# Patient Record
Sex: MALE | Race: BLACK OR AFRICAN AMERICAN | NOT HISPANIC OR LATINO | URBAN - METROPOLITAN AREA
[De-identification: names, ages, dates, MRNs, and addresses within clinical notes are randomized per-mention and may not be internally consistent; named-entity substitution may affect disease eponyms.]

---

## 2019-10-01 ENCOUNTER — OUTPATIENT (OUTPATIENT)
Dept: OUTPATIENT SERVICES | Facility: HOSPITAL | Age: 69
LOS: 1 days | End: 2019-10-01
Payer: MEDICARE

## 2019-10-01 DIAGNOSIS — Z98.89 OTHER SPECIFIED POSTPROCEDURAL STATES: Chronic | ICD-10-CM

## 2019-10-01 PROCEDURE — G9001: CPT

## 2019-10-03 ENCOUNTER — INPATIENT (INPATIENT)
Facility: HOSPITAL | Age: 69
LOS: 13 days | Discharge: HOME | DRG: 871 | End: 2019-10-17
Attending: HOSPITALIST | Admitting: HOSPITALIST
Payer: MEDICARE

## 2019-10-03 VITALS
RESPIRATION RATE: 20 BRPM | HEIGHT: 73 IN | TEMPERATURE: 98 F | OXYGEN SATURATION: 100 % | HEART RATE: 109 BPM | SYSTOLIC BLOOD PRESSURE: 133 MMHG | WEIGHT: 188.94 LBS | DIASTOLIC BLOOD PRESSURE: 102 MMHG

## 2019-10-03 DIAGNOSIS — A41.9 SEPSIS, UNSPECIFIED ORGANISM: ICD-10-CM

## 2019-10-03 DIAGNOSIS — Z98.89 OTHER SPECIFIED POSTPROCEDURAL STATES: Chronic | ICD-10-CM

## 2019-10-03 LAB
ALBUMIN SERPL ELPH-MCNC: 2.7 G/DL — LOW (ref 3.3–5.2)
ALBUMIN SERPL ELPH-MCNC: 3.3 G/DL — SIGNIFICANT CHANGE UP (ref 3.3–5.2)
ALP SERPL-CCNC: 107 U/L — SIGNIFICANT CHANGE UP (ref 40–120)
ALP SERPL-CCNC: 142 U/L — HIGH (ref 40–120)
ALT FLD-CCNC: 38 U/L — SIGNIFICANT CHANGE UP
ALT FLD-CCNC: 51 U/L — HIGH
AMMONIA BLD-MCNC: 22 UMOL/L — SIGNIFICANT CHANGE UP (ref 11–55)
ANION GAP SERPL CALC-SCNC: 14 MMOL/L — SIGNIFICANT CHANGE UP (ref 5–17)
ANION GAP SERPL CALC-SCNC: 17 MMOL/L — SIGNIFICANT CHANGE UP (ref 5–17)
ANISOCYTOSIS BLD QL: SLIGHT — SIGNIFICANT CHANGE UP
APAP SERPL-MCNC: <7.5 UG/ML — LOW (ref 10–26)
APPEARANCE CSF: CLEAR — SIGNIFICANT CHANGE UP
APTT BLD: 22.1 SEC — LOW (ref 27.5–36.3)
AST SERPL-CCNC: 157 U/L — HIGH
AST SERPL-CCNC: 203 U/L — HIGH
BASOPHILS # BLD AUTO: 0 K/UL — SIGNIFICANT CHANGE UP (ref 0–0.2)
BASOPHILS NFR BLD AUTO: 0 % — SIGNIFICANT CHANGE UP (ref 0–2)
BILIRUB DIRECT SERPL-MCNC: 0.7 MG/DL — HIGH (ref 0–0.3)
BILIRUB INDIRECT FLD-MCNC: 0.9 MG/DL — SIGNIFICANT CHANGE UP (ref 0.2–1)
BILIRUB SERPL-MCNC: 1.6 MG/DL — SIGNIFICANT CHANGE UP (ref 0.4–2)
BILIRUB SERPL-MCNC: 2.5 MG/DL — HIGH (ref 0.4–2)
BUN SERPL-MCNC: 18 MG/DL — SIGNIFICANT CHANGE UP (ref 8–20)
BUN SERPL-MCNC: 20 MG/DL — SIGNIFICANT CHANGE UP (ref 8–20)
CALCIUM SERPL-MCNC: 7.6 MG/DL — LOW (ref 8.6–10.2)
CALCIUM SERPL-MCNC: 8.8 MG/DL — SIGNIFICANT CHANGE UP (ref 8.6–10.2)
CHLORIDE SERPL-SCNC: 90 MMOL/L — LOW (ref 98–107)
CHLORIDE SERPL-SCNC: 93 MMOL/L — LOW (ref 98–107)
CK MB CFR SERPL CALC: 8.6 NG/ML — HIGH (ref 0–6.7)
CK SERPL-CCNC: 3499 U/L — HIGH (ref 30–200)
CO2 SERPL-SCNC: 22 MMOL/L — SIGNIFICANT CHANGE UP (ref 22–29)
CO2 SERPL-SCNC: 23 MMOL/L — SIGNIFICANT CHANGE UP (ref 22–29)
COLOR CSF: SIGNIFICANT CHANGE UP
CREAT SERPL-MCNC: 1.47 MG/DL — HIGH (ref 0.5–1.3)
CREAT SERPL-MCNC: 1.84 MG/DL — HIGH (ref 0.5–1.3)
EOSINOPHIL # BLD AUTO: 0 K/UL — SIGNIFICANT CHANGE UP (ref 0–0.5)
EOSINOPHIL NFR BLD AUTO: 0 % — SIGNIFICANT CHANGE UP (ref 0–6)
ETHANOL SERPL-MCNC: <10 MG/DL — SIGNIFICANT CHANGE UP
GIANT PLATELETS BLD QL SMEAR: PRESENT — SIGNIFICANT CHANGE UP
GLUCOSE CSF-MCNC: 81 MG/DLG/24H — HIGH (ref 40–70)
GLUCOSE SERPL-MCNC: 116 MG/DL — HIGH (ref 70–115)
GLUCOSE SERPL-MCNC: 97 MG/DL — SIGNIFICANT CHANGE UP (ref 70–115)
GRAM STN FLD: SIGNIFICANT CHANGE UP
HCT VFR BLD CALC: 34.8 % — LOW (ref 39–50)
HGB BLD-MCNC: 11.4 G/DL — LOW (ref 13–17)
INR BLD: 1.05 RATIO — SIGNIFICANT CHANGE UP (ref 0.88–1.16)
LACTATE BLDV-MCNC: 2.7 MMOL/L — HIGH (ref 0.5–2)
LACTATE BLDV-MCNC: 4.5 MMOL/L — CRITICAL HIGH (ref 0.5–2)
LYMPHOCYTES # BLD AUTO: 0.73 K/UL — LOW (ref 1–3.3)
LYMPHOCYTES # BLD AUTO: 10.3 % — LOW (ref 13–44)
MACROCYTES BLD QL: SLIGHT — SIGNIFICANT CHANGE UP
MAGNESIUM SERPL-MCNC: 1.2 MG/DL — LOW (ref 1.6–2.6)
MANUAL SMEAR VERIFICATION: SIGNIFICANT CHANGE UP
MCHC RBC-ENTMCNC: 32.8 GM/DL — SIGNIFICANT CHANGE UP (ref 32–36)
MCHC RBC-ENTMCNC: 34.5 PG — HIGH (ref 27–34)
MCV RBC AUTO: 105.5 FL — HIGH (ref 80–100)
MONOCYTES # BLD AUTO: 0.73 K/UL — SIGNIFICANT CHANGE UP (ref 0–0.9)
MONOCYTES NFR BLD AUTO: 10.3 % — SIGNIFICANT CHANGE UP (ref 2–14)
MYELOCYTES NFR BLD: 0.8 % — HIGH (ref 0–0)
NEUTROPHILS # BLD AUTO: 5.51 K/UL — SIGNIFICANT CHANGE UP (ref 1.8–7.4)
NEUTROPHILS # CSF: SIGNIFICANT CHANGE UP %
NEUTROPHILS NFR BLD AUTO: 77 % — SIGNIFICANT CHANGE UP (ref 43–77)
NEUTS BAND # BLD: 0.8 % — SIGNIFICANT CHANGE UP (ref 0–8)
NRBC NFR CSF: 0 /UL — SIGNIFICANT CHANGE UP (ref 0–5)
NT-PROBNP SERPL-SCNC: 1126 PG/ML — HIGH (ref 0–300)
PHOSPHATE SERPL-MCNC: 3.5 MG/DL — SIGNIFICANT CHANGE UP (ref 2.4–4.7)
PLAT MORPH BLD: NORMAL — SIGNIFICANT CHANGE UP
PLATELET # BLD AUTO: 139 K/UL — LOW (ref 150–400)
PLATELET COUNT - ESTIMATE: NORMAL — SIGNIFICANT CHANGE UP
POTASSIUM SERPL-MCNC: 3.6 MMOL/L — SIGNIFICANT CHANGE UP (ref 3.5–5.3)
POTASSIUM SERPL-MCNC: 4 MMOL/L — SIGNIFICANT CHANGE UP (ref 3.5–5.3)
POTASSIUM SERPL-SCNC: 3.6 MMOL/L — SIGNIFICANT CHANGE UP (ref 3.5–5.3)
POTASSIUM SERPL-SCNC: 4 MMOL/L — SIGNIFICANT CHANGE UP (ref 3.5–5.3)
PROT CSF-MCNC: 36 MG/DL — SIGNIFICANT CHANGE UP (ref 15–45)
PROT SERPL-MCNC: 7.1 G/DL — SIGNIFICANT CHANGE UP (ref 6.6–8.7)
PROT SERPL-MCNC: 8.6 G/DL — SIGNIFICANT CHANGE UP (ref 6.6–8.7)
PROTHROM AB SERPL-ACNC: 12.1 SEC — SIGNIFICANT CHANGE UP (ref 10–12.9)
RAPID RVP RESULT: SIGNIFICANT CHANGE UP
RBC # BLD: 3.3 M/UL — LOW (ref 4.2–5.8)
RBC # CSF: 0 /CMM — SIGNIFICANT CHANGE UP (ref 0–1)
RBC # FLD: 12 % — SIGNIFICANT CHANGE UP (ref 10.3–14.5)
RBC BLD AUTO: ABNORMAL
SALICYLATES SERPL-MCNC: <0.6 MG/DL — LOW (ref 10–20)
SODIUM SERPL-SCNC: 129 MMOL/L — LOW (ref 135–145)
SODIUM SERPL-SCNC: 130 MMOL/L — LOW (ref 135–145)
SPECIMEN SOURCE: SIGNIFICANT CHANGE UP
TROPONIN T SERPL-MCNC: 0.02 NG/ML — SIGNIFICANT CHANGE UP (ref 0–0.06)
TSH SERPL-MCNC: 0.86 UIU/ML — SIGNIFICANT CHANGE UP (ref 0.27–4.2)
TUBE TYPE: SIGNIFICANT CHANGE UP
VARIANT LYMPHS # BLD: 0.8 % — SIGNIFICANT CHANGE UP (ref 0–6)
WBC # BLD: 7.08 K/UL — SIGNIFICANT CHANGE UP (ref 3.8–10.5)
WBC # FLD AUTO: 7.08 K/UL — SIGNIFICANT CHANGE UP (ref 3.8–10.5)

## 2019-10-03 PROCEDURE — 70450 CT HEAD/BRAIN W/O DYE: CPT | Mod: 26

## 2019-10-03 PROCEDURE — 71045 X-RAY EXAM CHEST 1 VIEW: CPT | Mod: 26

## 2019-10-03 PROCEDURE — 76705 ECHO EXAM OF ABDOMEN: CPT | Mod: 26

## 2019-10-03 PROCEDURE — 99291 CRITICAL CARE FIRST HOUR: CPT | Mod: 25

## 2019-10-03 PROCEDURE — 93971 EXTREMITY STUDY: CPT | Mod: 26,LT

## 2019-10-03 PROCEDURE — 93010 ELECTROCARDIOGRAM REPORT: CPT

## 2019-10-03 PROCEDURE — 62270 DX LMBR SPI PNXR: CPT

## 2019-10-03 RX ORDER — ACYCLOVIR SODIUM 500 MG
850 VIAL (EA) INTRAVENOUS EVERY 12 HOURS
Refills: 0 | Status: DISCONTINUED | OUTPATIENT
Start: 2019-10-03 | End: 2019-10-04

## 2019-10-03 RX ORDER — ACETAMINOPHEN 500 MG
650 TABLET ORAL EVERY 6 HOURS
Refills: 0 | Status: DISCONTINUED | OUTPATIENT
Start: 2019-10-03 | End: 2019-10-06

## 2019-10-03 RX ORDER — VANCOMYCIN HCL 1 G
1000 VIAL (EA) INTRAVENOUS EVERY 12 HOURS
Refills: 0 | Status: DISCONTINUED | OUTPATIENT
Start: 2019-10-03 | End: 2019-10-03

## 2019-10-03 RX ORDER — ACYCLOVIR SODIUM 500 MG
850 VIAL (EA) INTRAVENOUS EVERY 8 HOURS
Refills: 0 | Status: DISCONTINUED | OUTPATIENT
Start: 2019-10-03 | End: 2019-10-03

## 2019-10-03 RX ORDER — VANCOMYCIN HCL 1 G
1000 VIAL (EA) INTRAVENOUS ONCE
Refills: 0 | Status: COMPLETED | OUTPATIENT
Start: 2019-10-03 | End: 2019-10-03

## 2019-10-03 RX ORDER — CEFTRIAXONE 500 MG/1
2000 INJECTION, POWDER, FOR SOLUTION INTRAMUSCULAR; INTRAVENOUS ONCE
Refills: 0 | Status: COMPLETED | OUTPATIENT
Start: 2019-10-03 | End: 2019-10-03

## 2019-10-03 RX ORDER — SODIUM CHLORIDE 9 MG/ML
2700 INJECTION INTRAMUSCULAR; INTRAVENOUS; SUBCUTANEOUS ONCE
Refills: 0 | Status: COMPLETED | OUTPATIENT
Start: 2019-10-03 | End: 2019-10-03

## 2019-10-03 RX ORDER — HEPARIN SODIUM 5000 [USP'U]/ML
3500 INJECTION INTRAVENOUS; SUBCUTANEOUS EVERY 6 HOURS
Refills: 0 | Status: DISCONTINUED | OUTPATIENT
Start: 2019-10-03 | End: 2019-10-04

## 2019-10-03 RX ORDER — SODIUM CHLORIDE 9 MG/ML
1000 INJECTION, SOLUTION INTRAVENOUS
Refills: 0 | Status: DISCONTINUED | OUTPATIENT
Start: 2019-10-03 | End: 2019-10-03

## 2019-10-03 RX ORDER — SODIUM CHLORIDE 9 MG/ML
1000 INJECTION INTRAMUSCULAR; INTRAVENOUS; SUBCUTANEOUS
Refills: 0 | Status: DISCONTINUED | OUTPATIENT
Start: 2019-10-03 | End: 2019-10-04

## 2019-10-03 RX ORDER — ACYCLOVIR SODIUM 500 MG
850 VIAL (EA) INTRAVENOUS ONCE
Refills: 0 | Status: COMPLETED | OUTPATIENT
Start: 2019-10-03 | End: 2019-10-03

## 2019-10-03 RX ORDER — SODIUM CHLORIDE 9 MG/ML
1000 INJECTION INTRAMUSCULAR; INTRAVENOUS; SUBCUTANEOUS ONCE
Refills: 0 | Status: DISCONTINUED | OUTPATIENT
Start: 2019-10-03 | End: 2019-10-03

## 2019-10-03 RX ORDER — THIAMINE MONONITRATE (VIT B1) 100 MG
100 TABLET ORAL DAILY
Refills: 0 | Status: COMPLETED | OUTPATIENT
Start: 2019-10-03 | End: 2019-10-06

## 2019-10-03 RX ORDER — SODIUM CHLORIDE 9 MG/ML
1000 INJECTION, SOLUTION INTRAVENOUS
Refills: 0 | Status: DISCONTINUED | OUTPATIENT
Start: 2019-10-03 | End: 2019-10-06

## 2019-10-03 RX ORDER — FOLIC ACID 0.8 MG
1 TABLET ORAL DAILY
Refills: 0 | Status: DISCONTINUED | OUTPATIENT
Start: 2019-10-03 | End: 2019-10-17

## 2019-10-03 RX ORDER — SACCHAROMYCES BOULARDII 250 MG
250 POWDER IN PACKET (EA) ORAL
Refills: 0 | Status: DISCONTINUED | OUTPATIENT
Start: 2019-10-03 | End: 2019-10-14

## 2019-10-03 RX ORDER — CEFTRIAXONE 500 MG/1
2000 INJECTION, POWDER, FOR SOLUTION INTRAMUSCULAR; INTRAVENOUS EVERY 12 HOURS
Refills: 0 | Status: DISCONTINUED | OUTPATIENT
Start: 2019-10-03 | End: 2019-10-04

## 2019-10-03 RX ORDER — VANCOMYCIN HCL 1 G
500 VIAL (EA) INTRAVENOUS EVERY 12 HOURS
Refills: 0 | Status: DISCONTINUED | OUTPATIENT
Start: 2019-10-03 | End: 2019-10-04

## 2019-10-03 RX ORDER — CEFTRIAXONE 500 MG/1
1000 INJECTION, POWDER, FOR SOLUTION INTRAMUSCULAR; INTRAVENOUS ONCE
Refills: 0 | Status: DISCONTINUED | OUTPATIENT
Start: 2019-10-03 | End: 2019-10-03

## 2019-10-03 RX ORDER — HEPARIN SODIUM 5000 [USP'U]/ML
INJECTION INTRAVENOUS; SUBCUTANEOUS
Qty: 25000 | Refills: 0 | Status: DISCONTINUED | OUTPATIENT
Start: 2019-10-03 | End: 2019-10-04

## 2019-10-03 RX ORDER — ACETAMINOPHEN 500 MG
975 TABLET ORAL ONCE
Refills: 0 | Status: COMPLETED | OUTPATIENT
Start: 2019-10-03 | End: 2019-10-03

## 2019-10-03 RX ORDER — HEPARIN SODIUM 5000 [USP'U]/ML
7000 INJECTION INTRAVENOUS; SUBCUTANEOUS EVERY 6 HOURS
Refills: 0 | Status: DISCONTINUED | OUTPATIENT
Start: 2019-10-03 | End: 2019-10-04

## 2019-10-03 RX ADMIN — SODIUM CHLORIDE 150 MILLILITER(S): 9 INJECTION INTRAMUSCULAR; INTRAVENOUS; SUBCUTANEOUS at 20:35

## 2019-10-03 RX ADMIN — CEFTRIAXONE 100 MILLIGRAM(S): 500 INJECTION, POWDER, FOR SOLUTION INTRAMUSCULAR; INTRAVENOUS at 15:16

## 2019-10-03 RX ADMIN — Medication 975 MILLIGRAM(S): at 16:17

## 2019-10-03 RX ADMIN — Medication 975 MILLIGRAM(S): at 15:17

## 2019-10-03 RX ADMIN — SODIUM CHLORIDE 2700 MILLILITER(S): 9 INJECTION INTRAMUSCULAR; INTRAVENOUS; SUBCUTANEOUS at 15:16

## 2019-10-03 RX ADMIN — Medication 250 MILLIGRAM(S): at 22:46

## 2019-10-03 RX ADMIN — Medication 100 MILLIGRAM(S): at 20:34

## 2019-10-03 RX ADMIN — Medication 2 MILLIGRAM(S): at 15:17

## 2019-10-03 NOTE — ED PROCEDURE NOTE - CPROC ED TIME OUT STATEMENT1
“Patient's name, , procedure and correct site were confirmed during the [facility] Timeout.” 19-Jul-2017

## 2019-10-03 NOTE — ED PROCEDURE NOTE - ATTENDING CONTRIBUTION TO CARE
I was present for key portion of procedure. -Jocelyn
I was present for key portion of procedure. -Jocelyn

## 2019-10-03 NOTE — H&P ADULT - HISTORY OF PRESENT ILLNESS
pt. is lying in bed tired looking and somewhat sedated, does not contribute to history. history of obtained from ED attending and patient chart.     This is a 67 y/o male with h/o HTN, EOTH use who has been laying on the bathroom floor for 2 days, hallucinating, weak with left LE edema. patient mother called EMS. patient currently being admitted with ETOH withdrawal, CIWA 5 status post ativan. Sepsis source unknown, status post LP. Rhabdomyolysis with KAYLEE and Left LE DVT.

## 2019-10-03 NOTE — H&P ADULT - NSHPSOCIALHISTORY_GEN_ALL_CORE
smoking history unknown , as per history drinks etoh. daily ? quantity ?    FH : unknown at this point as pt. not giving history.

## 2019-10-03 NOTE — ED ADULT NURSE REASSESSMENT NOTE - NS ED NURSE REASSESS COMMENT FT1
per day Rn pt did not have IV access, unable to get all of fluids, MD Erickson at bedside, aware of bp, plan per MD fluids, abx then heparin, per MD hold librium for now pt sleeping comfortably remains on monitor, offers no complaints. alert to self.  periods of confusion noted poor historian.

## 2019-10-03 NOTE — ED PROVIDER NOTE - OBJECTIVE STATEMENT
67yo M with h/o HTN, daily drinker a few beers, denies h/o withdrawal, OSCAR, mother called 911, patient laid onto floor 2 days ago and could not get back up, denies actual fall. pain to leg throbbing nonradiating worse with movement. says too weak and leg swollen so can't get up. left leg > rt pain in foot. no HA/neck pain. no rash. no nausea/vomiting/diarrhea. no abd pain. no cough/SOB. no family at bedside. denies CP.

## 2019-10-03 NOTE — ED PROVIDER NOTE - CRITICAL CARE PROVIDED
additional history taking/consultation with other physicians/documentation/direct patient care (not related to procedure)/interpretation of diagnostic studies additional history taking/documentation/consult w/ pt's family directly relating to pts condition/direct patient care (not related to procedure)/interpretation of diagnostic studies/consultation with other physicians

## 2019-10-03 NOTE — ED ADULT TRIAGE NOTE - CHIEF COMPLAINT QUOTE
68 year old male alert to self and place biba from home per ems pt mother called and reported pt was in bathroom for two days, lay down on bathroom floor, visual hallucinations, admits to drinking 2 or 3 beers daily and no reports of etoh abuse, last drink 2 days pta, no falls. Per EMS mother has been feeding pt in the bathroom as he would not get up from floor.

## 2019-10-03 NOTE — H&P ADULT - ASSESSMENT
pt. is admitted for     - confusion.     - Acute febrile illness    - Left lower ext. acute dvt unspecified vein.    - Alcohol withdrawal syndrome without complication.    - Darci    - Rhabdomyolysis.     Pt.'s LP negative for now, confusion and fever , meninigitis is in differential, possible viral but will keep on vanco, rocephin and acyclovir for now, follow csf studies, ID consult. iv hydration, iv heparin for dvt,  ativan per symptom triggered. close follow up of labs and ck. Ck elevated as pt. was on the floor for 2 days per history.

## 2019-10-03 NOTE — ED ADULT NURSE REASSESSMENT NOTE - NS ED NURSE REASSESS COMMENT FT1
pr back from MD Jocelyn norris at bedside, MD made aware pts IV very positional and not flowing well. pt encouraged to keep arm down, MD states she will place another line.

## 2019-10-03 NOTE — ED PROVIDER NOTE - PROGRESS NOTE DETAILS
febrile, CODE SEPSIS called, unknown source at this time, patient denies HA/neck pain, no meningismus on exam. patient AOx3. -Jocelyn DO spoke with mother, patient daily heavy drinker, was hallucinating today, seeing  and kids in the yard. no trauma, 2 days ago didn't feel well so laid down on bathroom floor. no head trauma. PMH- HTN only, unsure of h/o withdrawal. unsure of any infecitous sx. never acted like this before. no leukocytosis, noted to have elevated LFTs with AST double ALT consistent with alcohl use but also elevated alk phos and bili. no abd pain on exam no jaundice, will get sono right upper quadrant. will proceed with LP to completely r/o meningitis/encephalitis -Slowey DO patient received ativan, tremors improved, +DVT with complex bakers cyst- Lt knee no erythema/warmth FROM without pain. no concern for septic joint. will perform LP unknown source of fever. cover for encephalitis. RVP/UA pending. US GB wnl. will admit -Jocelyn DO

## 2019-10-03 NOTE — ED PROVIDER NOTE - CLINICAL SUMMARY MEDICAL DECISION MAKING FREE TEXT BOX
patient with AMS per family, daily dirnker has not drank in 2 days, has been on bathroom floor per patient due to leg swelling and pain. patient AOx3 now. has swelling to Lt LE and pain no rash. no distress. +tremor/fasciculations concern for withdrawal. will contact family. tachycardic. will get rectal temp for fever. no meningismus unlikely mengitis/encephelitis

## 2019-10-03 NOTE — ED PROVIDER NOTE - PHYSICAL EXAMINATION
Gen: NAD, AOx3, disheveled, smells of urine  Head: NCAT  HEENT: PERRL, EOMI, oral mucosa moist, normal conjunctiva, neck supple, +tongue fasciculations  Lung: CTAB, no respiratory distress  CV: rrr, no murmur, Normal perfusion  Abd: soft, NTND  MSK: +2 rt pedal pitting edema, +3 Lt leg/pedal pitting edema, no visible deformities  Neuro: No focal neurologic deficits, 5/5 UE strength, CN grossly intact, 4/5 rt LE strength, 3/5 Lt LE strength due to pain  Skin: No rash   Psych: flat affect

## 2019-10-03 NOTE — ED PROCEDURE NOTE - CPROC ED POST PROC CARE GUIDE1
Verbal/written post procedure instructions were given to patient/caregiver. Instructed patient/caregiver regarding signs and symptoms of infection./Verbal/written post procedure instructions were given to patient/caregiver.

## 2019-10-03 NOTE — ED PROVIDER NOTE - NS ED ROS FT
ROS: no CP/SOB. no cough. no fever. no n/v/d/c. no abd pain. no rash. no bleeding. no urinary complaints. +weakness. no vision changes. no HA. no neck/back pain. +extremity swelling. +change in mental status.

## 2019-10-03 NOTE — ED PROVIDER NOTE - SECONDARY DIAGNOSIS.
Non-traumatic rhabdomyolysis Acute kidney injury Altered mental status Alcohol withdrawal syndrome without complication Deep vein thrombophlebitis of leg, left

## 2019-10-03 NOTE — ED PROVIDER NOTE - CARE PLAN
Principal Discharge DX:	Sepsis  Secondary Diagnosis:	Non-traumatic rhabdomyolysis  Secondary Diagnosis:	Acute kidney injury  Secondary Diagnosis:	Altered mental status  Secondary Diagnosis:	Deep vein thrombophlebitis of leg, left  Secondary Diagnosis:	Alcohol withdrawal syndrome without complication

## 2019-10-03 NOTE — CONSULT NOTE ADULT - SUBJECTIVE AND OBJECTIVE BOX
Consulted request: Limited due to patient current medical condition. patient is minimally interactive with video conference. history of obtained from ED attending and patient chart.     This is a 67 y/o male with h/o HTN, EOTH use who has been laying on the bathroom floor for 2 days, hallucinating, weak with left LE edema. patient mother called EMS. patient currently being admitted with ETOH withdrawal, CIWA 5 status post ativan. Sepsis source unknown, status post LP. Rhabdomyolysis with KAYLEE and Left LE DVT.     - Basic orders placed   - CIWA protocol with symptoms triggered Ativan   - thiamine, folic acid, MVI  - IV hydration with MVI, prior to cont of Normal saline IV fluids  - Bedrest  [facility]  -   - Admit to monitored unit   - Hep drip pending to start once IV access is obtained.     Case discussed with Dr. Garland. Agree with limited orders placed.

## 2019-10-03 NOTE — H&P ADULT - NSHPPHYSICALEXAM_GEN_ALL_CORE
General: AA male lying in bed somewhat sedated , tired looking.   HEENT: AT, NC. PERRL. mucosa somewhat dry.  Neck: supple. no JVD.   Chest: CTA bilaterally  Heart: S1,S2. RRR. no heart murmur. trace edema of LE , left more than rt.  Abdomen: soft. non-tender. non-distended. + BS.   Ext: no calf tenderness. dp intact.  Neuro: pt. is somewhat sedated and not co-operative with exam, opens his eyes to verbal command and then goes back to sleep. noted to move all ext on his own.   Skin: warm and dry, no obvious rash.   Psych ; sedated, no agitation.

## 2019-10-04 LAB
ALBUMIN SERPL ELPH-MCNC: 2.5 G/DL — LOW (ref 3.3–5.2)
ALBUMIN SERPL ELPH-MCNC: 2.8 G/DL — LOW (ref 3.3–5.2)
ALP SERPL-CCNC: 100 U/L — SIGNIFICANT CHANGE UP (ref 40–120)
ALP SERPL-CCNC: 123 U/L — HIGH (ref 40–120)
ALT FLD-CCNC: 38 U/L — SIGNIFICANT CHANGE UP
ALT FLD-CCNC: 49 U/L — HIGH
AMPHET UR-MCNC: NEGATIVE — SIGNIFICANT CHANGE UP
ANION GAP SERPL CALC-SCNC: 11 MMOL/L — SIGNIFICANT CHANGE UP (ref 5–17)
ANION GAP SERPL CALC-SCNC: 12 MMOL/L — SIGNIFICANT CHANGE UP (ref 5–17)
ANION GAP SERPL CALC-SCNC: 14 MMOL/L — SIGNIFICANT CHANGE UP (ref 5–17)
ANISOCYTOSIS BLD QL: SLIGHT — SIGNIFICANT CHANGE UP
APPEARANCE UR: ABNORMAL
APPEARANCE UR: CLEAR — SIGNIFICANT CHANGE UP
APTT BLD: 130.9 SEC — CRITICAL HIGH (ref 27.5–36.3)
APTT BLD: 97.3 SEC — HIGH (ref 27.5–36.3)
AST SERPL-CCNC: 137 U/L — HIGH
AST SERPL-CCNC: 173 U/L — HIGH
BACTERIA # UR AUTO: ABNORMAL
BACTERIA # UR AUTO: ABNORMAL
BARBITURATES UR SCN-MCNC: NEGATIVE — SIGNIFICANT CHANGE UP
BASOPHILS # BLD AUTO: 0.01 K/UL — SIGNIFICANT CHANGE UP (ref 0–0.2)
BASOPHILS # BLD AUTO: 0.01 K/UL — SIGNIFICANT CHANGE UP (ref 0–0.2)
BASOPHILS NFR BLD AUTO: 0.2 % — SIGNIFICANT CHANGE UP (ref 0–2)
BASOPHILS NFR BLD AUTO: 0.3 % — SIGNIFICANT CHANGE UP (ref 0–2)
BENZODIAZ UR-MCNC: NEGATIVE — SIGNIFICANT CHANGE UP
BILIRUB SERPL-MCNC: 1 MG/DL — SIGNIFICANT CHANGE UP (ref 0.4–2)
BILIRUB SERPL-MCNC: 1.5 MG/DL — SIGNIFICANT CHANGE UP (ref 0.4–2)
BILIRUB UR-MCNC: NEGATIVE — SIGNIFICANT CHANGE UP
BILIRUB UR-MCNC: NEGATIVE — SIGNIFICANT CHANGE UP
BUN SERPL-MCNC: 11 MG/DL — SIGNIFICANT CHANGE UP (ref 8–20)
BUN SERPL-MCNC: 13 MG/DL — SIGNIFICANT CHANGE UP (ref 8–20)
BUN SERPL-MCNC: 14 MG/DL — SIGNIFICANT CHANGE UP (ref 8–20)
CALCIUM SERPL-MCNC: 7.2 MG/DL — LOW (ref 8.6–10.2)
CALCIUM SERPL-MCNC: 7.4 MG/DL — LOW (ref 8.6–10.2)
CALCIUM SERPL-MCNC: 7.9 MG/DL — LOW (ref 8.6–10.2)
CHLORIDE SERPL-SCNC: 95 MMOL/L — LOW (ref 98–107)
CHLORIDE SERPL-SCNC: 96 MMOL/L — LOW (ref 98–107)
CHLORIDE SERPL-SCNC: 97 MMOL/L — LOW (ref 98–107)
CK MB CFR SERPL CALC: 2.7 NG/ML — SIGNIFICANT CHANGE UP (ref 0–6.7)
CK MB CFR SERPL CALC: 2.8 NG/ML — SIGNIFICANT CHANGE UP (ref 0–6.7)
CK SERPL-CCNC: 1431 U/L — HIGH (ref 30–200)
CK SERPL-CCNC: 1534 U/L — HIGH (ref 30–200)
CO2 SERPL-SCNC: 21 MMOL/L — LOW (ref 22–29)
CO2 SERPL-SCNC: 22 MMOL/L — SIGNIFICANT CHANGE UP (ref 22–29)
CO2 SERPL-SCNC: 22 MMOL/L — SIGNIFICANT CHANGE UP (ref 22–29)
COCAINE METAB.OTHER UR-MCNC: NEGATIVE — SIGNIFICANT CHANGE UP
COLOR SPEC: ABNORMAL
COLOR SPEC: YELLOW — SIGNIFICANT CHANGE UP
CREAT SERPL-MCNC: 0.79 MG/DL — SIGNIFICANT CHANGE UP (ref 0.5–1.3)
CREAT SERPL-MCNC: 0.89 MG/DL — SIGNIFICANT CHANGE UP (ref 0.5–1.3)
CREAT SERPL-MCNC: 0.93 MG/DL — SIGNIFICANT CHANGE UP (ref 0.5–1.3)
CSF PCR RESULT: SIGNIFICANT CHANGE UP
DIFF PNL FLD: ABNORMAL
DIFF PNL FLD: ABNORMAL
EOSINOPHIL # BLD AUTO: 0.01 K/UL — SIGNIFICANT CHANGE UP (ref 0–0.5)
EOSINOPHIL # BLD AUTO: 0.02 K/UL — SIGNIFICANT CHANGE UP (ref 0–0.5)
EOSINOPHIL NFR BLD AUTO: 0.2 % — SIGNIFICANT CHANGE UP (ref 0–6)
EOSINOPHIL NFR BLD AUTO: 0.6 % — SIGNIFICANT CHANGE UP (ref 0–6)
EPI CELLS # UR: NEGATIVE — SIGNIFICANT CHANGE UP
GIANT PLATELETS BLD QL SMEAR: PRESENT — SIGNIFICANT CHANGE UP
GLUCOSE BLDC GLUCOMTR-MCNC: 106 MG/DL — HIGH (ref 70–99)
GLUCOSE SERPL-MCNC: 103 MG/DL — SIGNIFICANT CHANGE UP (ref 70–115)
GLUCOSE SERPL-MCNC: 107 MG/DL — SIGNIFICANT CHANGE UP (ref 70–115)
GLUCOSE SERPL-MCNC: 99 MG/DL — SIGNIFICANT CHANGE UP (ref 70–115)
GLUCOSE UR QL: NEGATIVE MG/DL — SIGNIFICANT CHANGE UP
GLUCOSE UR QL: NEGATIVE MG/DL — SIGNIFICANT CHANGE UP
HCT VFR BLD CALC: 25.6 % — LOW (ref 39–50)
HCT VFR BLD CALC: 28.2 % — LOW (ref 39–50)
HCV AB S/CO SERPL IA: 0.21 S/CO — SIGNIFICANT CHANGE UP (ref 0–0.99)
HCV AB SERPL-IMP: SIGNIFICANT CHANGE UP
HGB BLD-MCNC: 8.5 G/DL — LOW (ref 13–17)
HGB BLD-MCNC: 9.3 G/DL — LOW (ref 13–17)
HYPOCHROMIA BLD QL: SLIGHT — SIGNIFICANT CHANGE UP
IMM GRANULOCYTES NFR BLD AUTO: 0.3 % — SIGNIFICANT CHANGE UP (ref 0–1.5)
IMM GRANULOCYTES NFR BLD AUTO: 0.7 % — SIGNIFICANT CHANGE UP (ref 0–1.5)
KETONES UR-MCNC: ABNORMAL
KETONES UR-MCNC: NEGATIVE — SIGNIFICANT CHANGE UP
LACTATE BLDV-MCNC: 2.3 MMOL/L — HIGH (ref 0.5–2)
LACTATE BLDV-MCNC: 3.5 MMOL/L — HIGH (ref 0.5–2)
LACTATE SERPL-SCNC: 3.3 MMOL/L — HIGH (ref 0.5–2)
LEUKOCYTE ESTERASE UR-ACNC: ABNORMAL
LEUKOCYTE ESTERASE UR-ACNC: ABNORMAL
LYMPHOCYTES # BLD AUTO: 0.47 K/UL — LOW (ref 1–3.3)
LYMPHOCYTES # BLD AUTO: 0.76 K/UL — LOW (ref 1–3.3)
LYMPHOCYTES # BLD AUTO: 10.6 % — LOW (ref 13–44)
LYMPHOCYTES # BLD AUTO: 21.2 % — SIGNIFICANT CHANGE UP (ref 13–44)
MACROCYTES BLD QL: SLIGHT — SIGNIFICANT CHANGE UP
MAGNESIUM SERPL-MCNC: 1.7 MG/DL — SIGNIFICANT CHANGE UP (ref 1.6–2.6)
MANUAL SMEAR VERIFICATION: SIGNIFICANT CHANGE UP
MCHC RBC-ENTMCNC: 33 GM/DL — SIGNIFICANT CHANGE UP (ref 32–36)
MCHC RBC-ENTMCNC: 33.2 GM/DL — SIGNIFICANT CHANGE UP (ref 32–36)
MCHC RBC-ENTMCNC: 34.3 PG — HIGH (ref 27–34)
MCHC RBC-ENTMCNC: 35.1 PG — HIGH (ref 27–34)
MCV RBC AUTO: 103.2 FL — HIGH (ref 80–100)
MCV RBC AUTO: 106.4 FL — HIGH (ref 80–100)
METHADONE UR-MCNC: NEGATIVE — SIGNIFICANT CHANGE UP
MICROCYTES BLD QL: SLIGHT — SIGNIFICANT CHANGE UP
MONOCYTES # BLD AUTO: 0.35 K/UL — SIGNIFICANT CHANGE UP (ref 0–0.9)
MONOCYTES # BLD AUTO: 0.42 K/UL — SIGNIFICANT CHANGE UP (ref 0–0.9)
MONOCYTES NFR BLD AUTO: 9.5 % — SIGNIFICANT CHANGE UP (ref 2–14)
MONOCYTES NFR BLD AUTO: 9.7 % — SIGNIFICANT CHANGE UP (ref 2–14)
NEUTROPHILS # BLD AUTO: 2.44 K/UL — SIGNIFICANT CHANGE UP (ref 1.8–7.4)
NEUTROPHILS # BLD AUTO: 3.5 K/UL — SIGNIFICANT CHANGE UP (ref 1.8–7.4)
NEUTROPHILS NFR BLD AUTO: 67.9 % — SIGNIFICANT CHANGE UP (ref 43–77)
NEUTROPHILS NFR BLD AUTO: 78.8 % — HIGH (ref 43–77)
NITRITE UR-MCNC: POSITIVE
NITRITE UR-MCNC: POSITIVE
NT-PROBNP SERPL-SCNC: 617 PG/ML — HIGH (ref 0–300)
OPIATES UR-MCNC: NEGATIVE — SIGNIFICANT CHANGE UP
OVALOCYTES BLD QL SMEAR: SLIGHT — SIGNIFICANT CHANGE UP
PCP SPEC-MCNC: SIGNIFICANT CHANGE UP
PCP UR-MCNC: NEGATIVE — SIGNIFICANT CHANGE UP
PH UR: 6 — SIGNIFICANT CHANGE UP (ref 5–8)
PH UR: 6.5 — SIGNIFICANT CHANGE UP (ref 5–8)
PHOSPHATE SERPL-MCNC: 2.6 MG/DL — SIGNIFICANT CHANGE UP (ref 2.4–4.7)
PLAT MORPH BLD: ABNORMAL
PLATELET # BLD AUTO: 100 K/UL — LOW (ref 150–400)
PLATELET # BLD AUTO: 105 K/UL — LOW (ref 150–400)
POIKILOCYTOSIS BLD QL AUTO: SLIGHT — SIGNIFICANT CHANGE UP
POLYCHROMASIA BLD QL SMEAR: SLIGHT — SIGNIFICANT CHANGE UP
POTASSIUM SERPL-MCNC: 3 MMOL/L — LOW (ref 3.5–5.3)
POTASSIUM SERPL-MCNC: 3.1 MMOL/L — LOW (ref 3.5–5.3)
POTASSIUM SERPL-MCNC: 3.3 MMOL/L — LOW (ref 3.5–5.3)
POTASSIUM SERPL-SCNC: 3 MMOL/L — LOW (ref 3.5–5.3)
POTASSIUM SERPL-SCNC: 3.1 MMOL/L — LOW (ref 3.5–5.3)
POTASSIUM SERPL-SCNC: 3.3 MMOL/L — LOW (ref 3.5–5.3)
PROCALCITONIN SERPL-MCNC: 1.18 NG/ML — HIGH (ref 0.02–0.1)
PROT SERPL-MCNC: 6.5 G/DL — LOW (ref 6.6–8.7)
PROT SERPL-MCNC: 7.3 G/DL — SIGNIFICANT CHANGE UP (ref 6.6–8.7)
PROT UR-MCNC: 30 MG/DL
PROT UR-MCNC: 30 MG/DL
RBC # BLD: 2.48 M/UL — LOW (ref 4.2–5.8)
RBC # BLD: 2.65 M/UL — LOW (ref 4.2–5.8)
RBC # FLD: 11.7 % — SIGNIFICANT CHANGE UP (ref 10.3–14.5)
RBC # FLD: 11.9 % — SIGNIFICANT CHANGE UP (ref 10.3–14.5)
RBC BLD AUTO: ABNORMAL
RBC CASTS # UR COMP ASSIST: >50 /HPF (ref 0–4)
RBC CASTS # UR COMP ASSIST: ABNORMAL /HPF (ref 0–4)
SMUDGE CELLS # BLD: PRESENT — SIGNIFICANT CHANGE UP
SODIUM SERPL-SCNC: 129 MMOL/L — LOW (ref 135–145)
SODIUM SERPL-SCNC: 130 MMOL/L — LOW (ref 135–145)
SODIUM SERPL-SCNC: 131 MMOL/L — LOW (ref 135–145)
SP GR SPEC: 1.01 — SIGNIFICANT CHANGE UP (ref 1.01–1.02)
SP GR SPEC: 1.01 — SIGNIFICANT CHANGE UP (ref 1.01–1.02)
THC UR QL: NEGATIVE — SIGNIFICANT CHANGE UP
UROBILINOGEN FLD QL: 1 MG/DL
UROBILINOGEN FLD QL: NEGATIVE MG/DL — SIGNIFICANT CHANGE UP
WBC # BLD: 3.59 K/UL — LOW (ref 3.8–10.5)
WBC # BLD: 4.44 K/UL — SIGNIFICANT CHANGE UP (ref 3.8–10.5)
WBC # FLD AUTO: 3.59 K/UL — LOW (ref 3.8–10.5)
WBC # FLD AUTO: 4.44 K/UL — SIGNIFICANT CHANGE UP (ref 3.8–10.5)
WBC UR QL: ABNORMAL
WBC UR QL: SIGNIFICANT CHANGE UP

## 2019-10-04 PROCEDURE — 93010 ELECTROCARDIOGRAM REPORT: CPT

## 2019-10-04 PROCEDURE — 12345: CPT | Mod: NC

## 2019-10-04 PROCEDURE — 71045 X-RAY EXAM CHEST 1 VIEW: CPT | Mod: 26

## 2019-10-04 PROCEDURE — 99233 SBSQ HOSP IP/OBS HIGH 50: CPT

## 2019-10-04 RX ORDER — ENOXAPARIN SODIUM 100 MG/ML
80 INJECTION SUBCUTANEOUS EVERY 12 HOURS
Refills: 0 | Status: DISCONTINUED | OUTPATIENT
Start: 2019-10-04 | End: 2019-10-04

## 2019-10-04 RX ORDER — SODIUM CHLORIDE 9 MG/ML
1000 INJECTION INTRAMUSCULAR; INTRAVENOUS; SUBCUTANEOUS ONCE
Refills: 0 | Status: COMPLETED | OUTPATIENT
Start: 2019-10-04 | End: 2019-10-04

## 2019-10-04 RX ORDER — INFLUENZA VIRUS VACCINE 15; 15; 15; 15 UG/.5ML; UG/.5ML; UG/.5ML; UG/.5ML
0.5 SUSPENSION INTRAMUSCULAR ONCE
Refills: 0 | Status: COMPLETED | OUTPATIENT
Start: 2019-10-04 | End: 2019-10-04

## 2019-10-04 RX ORDER — ENOXAPARIN SODIUM 100 MG/ML
90 INJECTION SUBCUTANEOUS
Refills: 0 | Status: DISCONTINUED | OUTPATIENT
Start: 2019-10-04 | End: 2019-10-09

## 2019-10-04 RX ORDER — METOPROLOL TARTRATE 50 MG
5 TABLET ORAL ONCE
Refills: 0 | Status: COMPLETED | OUTPATIENT
Start: 2019-10-04 | End: 2019-10-04

## 2019-10-04 RX ORDER — CEFTRIAXONE 500 MG/1
1000 INJECTION, POWDER, FOR SOLUTION INTRAMUSCULAR; INTRAVENOUS EVERY 24 HOURS
Refills: 0 | Status: DISCONTINUED | OUTPATIENT
Start: 2019-10-04 | End: 2019-10-05

## 2019-10-04 RX ORDER — IBUPROFEN 200 MG
400 TABLET ORAL ONCE
Refills: 0 | Status: COMPLETED | OUTPATIENT
Start: 2019-10-04 | End: 2019-10-04

## 2019-10-04 RX ORDER — MAGNESIUM SULFATE 500 MG/ML
2 VIAL (ML) INJECTION ONCE
Refills: 0 | Status: COMPLETED | OUTPATIENT
Start: 2019-10-04 | End: 2019-10-04

## 2019-10-04 RX ORDER — VANCOMYCIN HCL 1 G
1000 VIAL (EA) INTRAVENOUS EVERY 12 HOURS
Refills: 0 | Status: DISCONTINUED | OUTPATIENT
Start: 2019-10-04 | End: 2019-10-04

## 2019-10-04 RX ORDER — IPRATROPIUM/ALBUTEROL SULFATE 18-103MCG
3 AEROSOL WITH ADAPTER (GRAM) INHALATION ONCE
Refills: 0 | Status: COMPLETED | OUTPATIENT
Start: 2019-10-04 | End: 2019-10-04

## 2019-10-04 RX ORDER — SODIUM CHLORIDE 9 MG/ML
500 INJECTION INTRAMUSCULAR; INTRAVENOUS; SUBCUTANEOUS ONCE
Refills: 0 | Status: COMPLETED | OUTPATIENT
Start: 2019-10-04 | End: 2019-10-04

## 2019-10-04 RX ORDER — SODIUM CHLORIDE 9 MG/ML
1000 INJECTION INTRAMUSCULAR; INTRAVENOUS; SUBCUTANEOUS
Refills: 0 | Status: DISCONTINUED | OUTPATIENT
Start: 2019-10-04 | End: 2019-10-06

## 2019-10-04 RX ORDER — SODIUM CHLORIDE 9 MG/ML
1000 INJECTION INTRAMUSCULAR; INTRAVENOUS; SUBCUTANEOUS
Refills: 0 | Status: DISCONTINUED | OUTPATIENT
Start: 2019-10-04 | End: 2019-10-04

## 2019-10-04 RX ADMIN — Medication 100 MILLIGRAM(S): at 10:11

## 2019-10-04 RX ADMIN — Medication 1 MILLIGRAM(S): at 02:26

## 2019-10-04 RX ADMIN — ENOXAPARIN SODIUM 90 MILLIGRAM(S): 100 INJECTION SUBCUTANEOUS at 23:22

## 2019-10-04 RX ADMIN — Medication 650 MILLIGRAM(S): at 20:08

## 2019-10-04 RX ADMIN — Medication 267 MILLIGRAM(S): at 00:09

## 2019-10-04 RX ADMIN — SODIUM CHLORIDE 150 MILLILITER(S): 9 INJECTION INTRAMUSCULAR; INTRAVENOUS; SUBCUTANEOUS at 02:45

## 2019-10-04 RX ADMIN — Medication 100 MILLIGRAM(S): at 11:48

## 2019-10-04 RX ADMIN — Medication 1 MILLIGRAM(S): at 01:24

## 2019-10-04 RX ADMIN — HEPARIN SODIUM 1600 UNIT(S)/HR: 5000 INJECTION INTRAVENOUS; SUBCUTANEOUS at 00:08

## 2019-10-04 RX ADMIN — Medication 5 MILLIGRAM(S): at 02:26

## 2019-10-04 RX ADMIN — Medication 1 TABLET(S): at 11:48

## 2019-10-04 RX ADMIN — Medication 50 GRAM(S): at 01:24

## 2019-10-04 RX ADMIN — CEFTRIAXONE 100 MILLIGRAM(S): 500 INJECTION, POWDER, FOR SOLUTION INTRAMUSCULAR; INTRAVENOUS at 09:27

## 2019-10-04 RX ADMIN — Medication 650 MILLIGRAM(S): at 02:45

## 2019-10-04 RX ADMIN — Medication 650 MILLIGRAM(S): at 03:36

## 2019-10-04 RX ADMIN — SODIUM CHLORIDE 1000 MILLILITER(S): 9 INJECTION INTRAMUSCULAR; INTRAVENOUS; SUBCUTANEOUS at 19:45

## 2019-10-04 RX ADMIN — Medication 650 MILLIGRAM(S): at 19:38

## 2019-10-04 RX ADMIN — Medication 1 MILLIGRAM(S): at 11:48

## 2019-10-04 NOTE — CHART NOTE - NSCHARTNOTEFT_GEN_A_CORE
PA NOTE-MED    Called by RN due to Pt BP-155/105  and Pt wheezing  This is a 69 y/o male with h/o HTN, EOTH use who has been laying on the bathroom floor for 2 days, hallucinating, weak with left LE edema. patient mother called EMS. patient admitted with ETOH withdrawal, CIWA 5 status post ativan. Sepsis source unknown, status post LP. Rhabdomyolysis with KAYLEE and Left LE DVT.   Pt received Ativan 1mg at 1 am     T(F):99.7 po  HR:76  BP: 155/105  RR: 16 (03 Oct 2019 20:32) (16 - 20)  SpO2: 97% RA    General: WDWN Male NAD Shaking (states hes cold)  HEENT: NC/AT  Cardiac: S1S2 Reg rhythm sl tachy  Lungs: + occasional wheezing B/L no rhonchi no rales B/L A-B  Ext: moves x 4  no C/C/E B/L LE  Integument: dry warm  color good     A/P Eval Pt 2/2 bp-155/105 and now Pulse-150 with occasional wheezing   Duoneb x 1 after tachycardia resolves   Lopressor 5 mg x 1 now   Repeat Lactate now   Continue to monitor Pt  Call PA if Pt condt worsens PA NOTE-MED    Called by RN due to Pt BP-155/105  and Pt wheezing  This is a 67 y/o male with h/o HTN, EOTH use who has been laying on the bathroom floor for 2 days, hallucinating, weak with left LE edema. patient mother called EMS. patient admitted with ETOH withdrawal, CIWA 5 status post ativan. Sepsis source unknown, status post LP. Rhabdomyolysis with KAYLEE and Left LE DVT.   Pt received Ativan 1mg at 1 am  Pt on Rocephin/Vancomycin    T(F):99.7 po  HR:76  BP: 155/105  RR: 16 (03 Oct 2019 20:32) (16 - 20)  SpO2: 97% RA    General: WDWN Male NAD Shaking (states hes cold)  HEENT: NC/AT  Cardiac: S1S2 Reg rhythm sl tachy  Lungs: + occasional wheezing B/L no rhonchi no rales B/L A-B  Ext: moves x 4  no C/C/E B/L LE  Integument: dry warm  color good     A/P Eval Pt 2/2 bp-155/105 and now Pulse-150 with occasional wheezing   Duoneb x 1 after tachycardia resolves   Lopressor 5 mg x 1 now   Repeat Lactate now   Continue to monitor Pt  Call PA if Pt condt worsens PA NOTE-MED    Called by RN due to Pt BP-155/105  and Pt wheezing  This is a 67 y/o male with h/o HTN, EOTH use who has been laying on the bathroom floor for 2 days, hallucinating, weak with left LE edema. patient mother called EMS. patient admitted with ETOH withdrawal, CIWA 5 status post ativan. Sepsis source unknown, status post LP. Rhabdomyolysis with KAYLEE and Left LE DVT.   Pt received Ativan 1mg at 1 am  Pt on Rocephin/Vancomycin    T(F):99.7 po  HR:76  BP: 155/105  RR: 16 (03 Oct 2019 20:32) (16 - 20)  SpO2: 97% RA    General: WDWN Male NAD   HEENT: NC/AT  Cardiac: S1S2 Reg rhythm sl tachy  Lungs: + occasional wheezing B/L no rhonchi no rales B/L A-B  Ext: moves x 4  no C/C/E B/L LE  Integument: dry warm  color good     A/P Eval Pt 2/2 bp-155/105 and now Pulse-150 with occasional wheezing   Duoneb x 1 after tachycardia resolves   Lopressor 5 mg x 1 now   Repeat Lactate now   Continue to monitor Pt  Call PA if Pt condt worsens PA NOTE-MED    Called by RN due to Pt BP-155/105  and Pt wheezing  This is a 69 y/o male with h/o HTN, EOTH use who has been laying on the bathroom floor for 2 days, hallucinating, weak with left LE edema. patient mother called EMS. patient admitted with ETOH withdrawal, CIWA 5 status post ativan. Sepsis source unknown, status post LP. Rhabdomyolysis with KAYLEE and Left LE DVT.   Pt received Ativan 1mg at 1 am  Pt on Rocephin/Vancomycin  Received 2700 cc bolus of NS in  ED Lactate 2.7 @ 20:00    T(F):99.7 po  HR:76  BP: 155/105  RR: 16 (03 Oct 2019 20:32) (16 - 20)  SpO2: 97% RA    General: WDWN Male NAD   HEENT: NC/AT  Cardiac: S1S2 Reg rhythm sl tachy  Lungs: + occasional wheezing B/L no rhonchi no rales B/L A-B  Ext: moves x 4  no C/C/E B/L LE  Integument: dry warm  color good     A/P Eval Pt 2/2 bp-155/105 and now Pulse-150 with occasional wheezing   Duoneb x 1 after tachycardia resolves   Lopressor 5 mg x 1 now   Repeat Lactate now   Continue to monitor Pt  Call PA if Pt condt worsens

## 2019-10-04 NOTE — ED ADULT NURSE NOTE - NSIMPLEMENTINTERV_GEN_ALL_ED
Implemented All Fall Risk Interventions:  [place] to call system. Call bell, personal items and telephone within reach. Instruct patient to call for assistance. Room bathroom lighting operational. Non-slip footwear when patient is off stretcher. Physically safe environment: no spills, clutter or unnecessary equipment. Stretcher in lowest position, wheels locked, appropriate side rails in place. Provide visual cue, wrist band, yellow gown, etc. Monitor gait and stability. Monitor for mental status changes and reorient to person, place, and time. Review medications for side effects contributing to fall risk. Reinforce activity limits and safety measures with patient and family.

## 2019-10-04 NOTE — CHART NOTE - NSCHARTNOTEFT_GEN_A_CORE
PA note    Rapid response was called on 4T by RN due to a lactate of 3.3 and a HR of 136.    This is a 67 y/o male with h/o HTN and EOTH use.  Sepsis source unknown, status post LP.  ID had D/C'd ABX and acyclovir. CXR was negative.  Patient also had Rhabdomyolysis with KAYLEE and Left LE DVT.   Rapid response was cancelled because there had been a sepsis work up within 48 HRS.     PE:  Vital Signs:  T(F): 98.1  HR: 130 ; 90  BP: 156/84   RR: 20   SpO2: 99%     General: patient was alert and oriented  NECK: Supple  LUNGS: clear to A  HEART: S1 and S2 NL  ABDOMEN: Soft, nontender, Positive bowel sounds  EXTREMITIES: no edema    A/P: (Cancelled RR) S/P Tachycardia and Inc. Lactate    - 1 dose of Rocephin 1g given    - sepsis labs ordered    - Patient was already receiving NS @ 75cc/hr    - straight cath. due to retention    - Patient stable    -  was present at event    - [facility] PA was present and took over care of his patient and agreed to follow up

## 2019-10-04 NOTE — CONSULT NOTE ADULT - SUBJECTIVE AND OBJECTIVE BOX
INFECTIOUS DISEASES AND INTERNAL MEDICINE at [facility]  =======================================================  Jorge Angel MD  Diplomates American Board of Internal Medicine and Infectious Diseases  Telephone [phone]  Fax            [phone]  =======================================================    CAMRON [id number]      HPI:     This is a 67 y/o male with h/o HTN, EOTH use who has been laying on the bathroom floor for 2 days, hallucinating, weak with left LE edema. patient mother called EMS. patient currently being admitted with ETOH withdrawal, CIWA 5 status post ativan. Sepsis source unknown, status post LP. Rhabdomyolysis with KAYLEE and Left LE DVT  LP NOT C/W  MENINGITIS.   ASKED TO EVALUATE FROM ID STANDPOINT       PAST MEDICAL & SURGICAL HISTORY:  Hypertension  H/O exploratory laparotomy      ANTIBIOTICS  acyclovir IVPB 850 milliGRAM(s) IV Intermittent every 12 hours  cefTRIAXone   IVPB 2000 milliGRAM(s) IV Intermittent every 12 hours  vancomycin  IVPB 1000 milliGRAM(s) IV Intermittent every 12 hours      Allergies    No Known Allergies    Intolerances        SOCIAL HISTORY:     FAMILY HX   FAMILY HISTORY:      Vital Signs Last 24 Hrs  T(C): 37.1 (04 Oct 2019 06:48), Max: 39.4 (04 Oct 2019 02:59)  T(F): 98.7 (04 Oct 2019 06:48), Max: 102.9 (04 Oct 2019 02:59)  HR: 101 (04 Oct 2019 06:48) (91 - 130)  BP: 139/87 (04 Oct 2019 04:12) (134/78 - 165/99)  BP(mean): --  RR: 18 (04 Oct 2019 04:12) (16 - 18)  SpO2: 96% (04 Oct 2019 04:12) (95% - 98%)  Drug Dosing Weight  Height (cm): 185.42 (03 Oct 2019 12:49)  Weight (kg): 85.7 (03 Oct 2019 12:49)  BMI (kg/m2): 24.9 (03 Oct 2019 12:49)  BSA (m2): 2.1 (03 Oct 2019 12:49)      REVIEW OF SYSTEMS:    CONSTITUTIONAL:  As per HPI.    HEENT:  Eyes:  No diplopia or blurred vision. ENT:  No earache, sore throat or runny nose.    CARDIOVASCULAR:  No pressure, squeezing, strangling, tightness, heaviness or aching about the chest, neck, axilla or epigastrium.    RESPIRATORY:  No cough, shortness of breath, PND or orthopnea.    GASTROINTESTINAL:  No nausea, vomiting or diarrhea.    GENITOURINARY:  No dysuria, frequency or urgency.    MUSCULOSKELETAL:  As per HPI.    SKIN:  No change in skin, hair or nails.    NEUROLOGIC:  No paresthesias, fasciculations, seizures or weakness.                  PHYSICAL EXAMINATION:    GENERAL: The patient is a well-developed,  IN NAD     VITAL SIGNS: T(C): 37.1 (10-04-19 @ 06:48), Max: 39.4 (10-04-19 @ 02:59)  HR: 101 (10-04-19 @ 06:48) (91 - 130)  BP: 139/87 (10-04-19 @ 04:12) (134/78 - 165/99)  RR: 18 (10-04-19 @ 04:12) (16 - 18)  SpO2: 96% (10-04-19 @ 04:12) (95% - 98%)  Wt(kg): --    HEENT: Head is normocephalic and atraumatic.  ANICTERIC  NECK: Supple. No carotid bruits.  No lymphadenopathy or thyromegaly.    LUNGS:COARSE BREATH SOUNDS    HEART: Regular rate and rhythm without murmur.    ABDOMEN: Soft, nontender, and nondistended.  Positive bowel sounds.  No hepatosplenomegaly was noted. NO REBOUND NO GUARDING    EXTREMITIES: NO EDEMA NO ERYTHEMA    NEUROLOGIC: NON FOCAL      SKIN: No ulceration or induration present. NO RASH        BLOOD CULTURES  Culture Results:   No growth to date  Culture in progress (10-03 @ 17:47)       URINE CX          LABS:                        8.5    4.44  )-----------( 105      ( 04 Oct 2019 06:15 )             25.6     10-04    130<L>  |  97<L>  |  13.0  ----------------------------<  103  3.1<L>   |  22.0  |  0.93    Ca    7.4<L>      04 Oct 2019 06:15  Phos  2.6     10-04  Mg     1.7     10-04    TPro  6.5<L>  /  Alb  2.5<L>  /  TBili  1.5  /  DBili  x   /  AST  137<H>  /  ALT  38  /  AlkPhos  100  10-04    PT/INR - ( 03 Oct 2019 14:52 )   PT: 12.1 sec;   INR: 1.05 ratio         PTT - ( 04 Oct 2019 06:13 )  PTT:97.3 sec      RADIOLOGY & ADDITIONAL STUDIES:      ASSESSMENT/PLAN  This is a 67 y/o male with h/o HTN, EOTH use who has been laying on the bathroom floor for 2 days, hallucinating, weak with left LE edema. patient mother called EMS. patient currently being admitted with ETOH withdrawal, CIWA 5 status post ativan. Sepsis source unknown, status post LP. Rhabdomyolysis with KAYLEE and Left LE DVT  LP NOT C/W  MENINGITIS.   PT MORE Alert  KNOWS HE IS AT [facility] KNOWs  YEAR NO COMPLAINTS   CSF OP WBC  CSF PCR NEG  WILL D/C ABX AND Acyclovir  CXR NEG  WILL FOLLOW UP   OBSERVE OFF ABX                  KEN MERIDA MD

## 2019-10-04 NOTE — ED ADULT NURSE NOTE - OBJECTIVE STATEMENT
pt AOX3 c/o leg swelling bilaterally, as per EMS pt is confused and fell 2 days ago and was unable to get up and stayed on the floor for 2 days. as per family pt drinks every day but pt denies this. code sepsis activated and MD Banks at bedside. pt poor historian and no family at bedside.

## 2019-10-04 NOTE — PROGRESS NOTE ADULT - ASSESSMENT
1. Acute alcohol withdrawal  CIWA of 3 , c/w ativan .  c/w B12 , folate and thiamine .    2. Acute lower extremity DVT   probably provoked by immobility .  switch heparin to lovenox and switch to NOAC if covered by insurance at time of discharge , otherwise will do coumadin .    3. Acute rhabdomyolysis   renal function  is  normal .  c/w gentle IV hydration .    4. Febrile illness   status post LP, seen by ID .  PCR negative .  antibiotics stopped , will monitor closely .    5. Thrombocytopaenia   due to alcohol.  will monitor.     6. Hypokalemia   replace aggressively.    7. Hyponatremia   likely due to dehydration   c/w gentle hydration .     8. Lactic acidosis '  improving .  stat orders placed to be repeated . waiting for it to be drawn .c/w gentle hydration .     9. Anemia   likley due to nutritional deficicny   will send work up , no ongoing blood loss.      10. DVT prophylaxis   on systemic AC .    details discussed with patient who is alert and awake, all concerns answered .  also discussed with bedside nurse.

## 2019-10-05 LAB
ANION GAP SERPL CALC-SCNC: 11 MMOL/L — SIGNIFICANT CHANGE UP (ref 5–17)
BUN SERPL-MCNC: 8 MG/DL — SIGNIFICANT CHANGE UP (ref 8–20)
CALCIUM SERPL-MCNC: 8.1 MG/DL — LOW (ref 8.6–10.2)
CHLORIDE SERPL-SCNC: 97 MMOL/L — LOW (ref 98–107)
CO2 SERPL-SCNC: 24 MMOL/L — SIGNIFICANT CHANGE UP (ref 22–29)
CREAT SERPL-MCNC: 0.59 MG/DL — SIGNIFICANT CHANGE UP (ref 0.5–1.3)
GLUCOSE SERPL-MCNC: 91 MG/DL — SIGNIFICANT CHANGE UP (ref 70–115)
HCT VFR BLD CALC: 29.3 % — LOW (ref 39–50)
HGB BLD-MCNC: 9.4 G/DL — LOW (ref 13–17)
LACTATE SERPL-SCNC: 1.7 MMOL/L — SIGNIFICANT CHANGE UP (ref 0.5–2)
LACTATE SERPL-SCNC: 2.4 MMOL/L — HIGH (ref 0.5–2)
MCHC RBC-ENTMCNC: 32.1 GM/DL — SIGNIFICANT CHANGE UP (ref 32–36)
MCHC RBC-ENTMCNC: 34.2 PG — HIGH (ref 27–34)
MCV RBC AUTO: 106.5 FL — HIGH (ref 80–100)
PLATELET # BLD AUTO: 120 K/UL — LOW (ref 150–400)
POTASSIUM SERPL-MCNC: 3.1 MMOL/L — LOW (ref 3.5–5.3)
POTASSIUM SERPL-SCNC: 3.1 MMOL/L — LOW (ref 3.5–5.3)
RBC # BLD: 2.75 M/UL — LOW (ref 4.2–5.8)
RBC # FLD: 11.9 % — SIGNIFICANT CHANGE UP (ref 10.3–14.5)
SODIUM SERPL-SCNC: 132 MMOL/L — LOW (ref 135–145)
VANCOMYCIN TROUGH SERPL-MCNC: 7 UG/ML — LOW (ref 10–20)
VDRL CSF-TITR: NEGATIVE — SIGNIFICANT CHANGE UP
WBC # BLD: 3.26 K/UL — LOW (ref 3.8–10.5)
WBC # FLD AUTO: 3.26 K/UL — LOW (ref 3.8–10.5)

## 2019-10-05 PROCEDURE — 99232 SBSQ HOSP IP/OBS MODERATE 35: CPT

## 2019-10-05 RX ORDER — POTASSIUM CHLORIDE 20 MEQ
40 PACKET (EA) ORAL DAILY
Refills: 0 | Status: DISCONTINUED | OUTPATIENT
Start: 2019-10-05 | End: 2019-10-06

## 2019-10-05 RX ORDER — POTASSIUM CHLORIDE 20 MEQ
40 PACKET (EA) ORAL EVERY 4 HOURS
Refills: 0 | Status: COMPLETED | OUTPATIENT
Start: 2019-10-05 | End: 2019-10-05

## 2019-10-05 RX ADMIN — Medication 250 MILLIGRAM(S): at 19:03

## 2019-10-05 RX ADMIN — ENOXAPARIN SODIUM 90 MILLIGRAM(S): 100 INJECTION SUBCUTANEOUS at 12:02

## 2019-10-05 RX ADMIN — ENOXAPARIN SODIUM 90 MILLIGRAM(S): 100 INJECTION SUBCUTANEOUS at 22:56

## 2019-10-05 RX ADMIN — Medication 40 MILLIEQUIVALENT(S): at 17:37

## 2019-10-05 RX ADMIN — Medication 250 MILLIGRAM(S): at 06:48

## 2019-10-05 RX ADMIN — Medication 40 MILLIEQUIVALENT(S): at 12:03

## 2019-10-05 RX ADMIN — Medication 1 TABLET(S): at 12:03

## 2019-10-05 RX ADMIN — Medication 100 MILLIGRAM(S): at 12:03

## 2019-10-05 RX ADMIN — Medication 1 MILLIGRAM(S): at 12:03

## 2019-10-05 NOTE — PROCEDURE NOTE - NSPROCDETAILS_GEN_ALL_CORE
Good blood return/dressing applied/secured in place/location identified, draped/prepped, sterile technique used/flushes easily/blood seen on insertion/sterile technique, catheter placed/ultrasound utilization

## 2019-10-05 NOTE — PROGRESS NOTE ADULT - ASSESSMENT
This is a 67 y/o male with h/o HTN, EOTH use who has been laying on the bathroom floor for 2 days, hallucinating, weak with left LE edema. patient mother called EMS. patient currently being admitted with ETOH withdrawal, CIWA 5 status post ativan. Sepsis source unknown, status post LP. Rhabdomyolysis with KAYLEE and Left LE DVT  LP NOT C/W  MENINGITIS.   PT MORE Alert  KNOWS HE IS AT [facility] KNOWs  YEAR NO COMPLAINTS   CSF OP WBC  CSF PCR NEG  OBSERVE OFF ABX   LACTATE UP YESTERDAY NOW NORMAL GIVEN ONE DOSE IV ABX ROCEPHIN YESTERDAY  DEFER FURTHER ABX  WILL FOLLOW UP

## 2019-10-05 NOTE — PROCEDURE NOTE - ADDITIONAL PROCEDURE DETAILS
Peripheral IV placed in the setting of no venous access in a patient being treated for UTI.      Not included in critical care time.

## 2019-10-05 NOTE — PROGRESS NOTE ADULT - ASSESSMENT
1. lactic acidosis   unclear etio , could be alcoholic ketoacidosis .  resolved .  c/w fluids once IV is in place .    2. Acute lower extremity DVT   probably provoked by immobility .  on lovenox , will switch to NOAC if covered by insurance .  keep extremity elevated , ACE wraps to remain in place .    3. Acute rhabdomyolysis   renal function  is  normal .  c/w gentle IV hydration . if unable to get IV , will do oral hydration .  follow on CK in am .    4. Febrile illness   status post LP, seen by ID .  PCR negative .  antibiotics stopped , will monitor closely .  patient does not have dysuria.     5. pancytopenia   due to alcohol.  will monitor.     6. Hypokalemia   replace aggressively.    7. Hyponatremia   likely due to dehydration   c/w gentle hydration .     8. s/p alcohol withdrawal   resolved.   continue to monitor.     9. Anemia   likley due to nutritional deficiency , stable , no need for transfusion     10. DVT prophylaxis   on systemic AC .    details discussed with patient who is alert and awake, all concerns answered .  also discussed with bedside nurse. will request PT consult.

## 2019-10-06 LAB
ALBUMIN SERPL ELPH-MCNC: 2.6 G/DL — LOW (ref 3.3–5.2)
ALP SERPL-CCNC: 120 U/L — SIGNIFICANT CHANGE UP (ref 40–120)
ALT FLD-CCNC: 59 U/L — HIGH
ANION GAP SERPL CALC-SCNC: 8 MMOL/L — SIGNIFICANT CHANGE UP (ref 5–17)
AST SERPL-CCNC: 162 U/L — HIGH
BILIRUB DIRECT SERPL-MCNC: 0.9 MG/DL — HIGH (ref 0–0.3)
BILIRUB INDIRECT FLD-MCNC: 0.5 MG/DL — SIGNIFICANT CHANGE UP (ref 0.2–1)
BILIRUB SERPL-MCNC: 1.4 MG/DL — SIGNIFICANT CHANGE UP (ref 0.4–2)
BUN SERPL-MCNC: 4 MG/DL — LOW (ref 8–20)
CALCIUM SERPL-MCNC: 8.2 MG/DL — LOW (ref 8.6–10.2)
CHLORIDE SERPL-SCNC: 99 MMOL/L — SIGNIFICANT CHANGE UP (ref 98–107)
CK MB CFR SERPL CALC: 1.4 NG/ML — SIGNIFICANT CHANGE UP (ref 0–6.7)
CK MB CFR SERPL CALC: 1.5 NG/ML — SIGNIFICANT CHANGE UP (ref 0–6.7)
CK SERPL-CCNC: 488 U/L — HIGH (ref 30–200)
CK SERPL-CCNC: 489 U/L — HIGH (ref 30–200)
CO2 SERPL-SCNC: 27 MMOL/L — SIGNIFICANT CHANGE UP (ref 22–29)
CREAT SERPL-MCNC: 0.6 MG/DL — SIGNIFICANT CHANGE UP (ref 0.5–1.3)
CULTURE RESULTS: NO GROWTH — SIGNIFICANT CHANGE UP
FERRITIN SERPL-MCNC: 845 NG/ML — HIGH (ref 30–400)
FOLATE SERPL-MCNC: 10.9 NG/ML — SIGNIFICANT CHANGE UP
GLUCOSE SERPL-MCNC: 95 MG/DL — SIGNIFICANT CHANGE UP (ref 70–115)
HCT VFR BLD CALC: 23.5 % — LOW (ref 39–50)
HCT VFR BLD CALC: 25.3 % — LOW (ref 39–50)
HGB BLD-MCNC: 7.9 G/DL — LOW (ref 13–17)
HGB BLD-MCNC: 8.1 G/DL — LOW (ref 13–17)
IRON SATN MFR SERPL: 35 % — SIGNIFICANT CHANGE UP (ref 16–55)
IRON SATN MFR SERPL: 72 UG/DL — SIGNIFICANT CHANGE UP (ref 59–158)
MCHC RBC-ENTMCNC: 33.6 GM/DL — SIGNIFICANT CHANGE UP (ref 32–36)
MCHC RBC-ENTMCNC: 35 PG — HIGH (ref 27–34)
MCV RBC AUTO: 104 FL — HIGH (ref 80–100)
OB PNL STL: POSITIVE
PLATELET # BLD AUTO: 127 K/UL — LOW (ref 150–400)
POTASSIUM SERPL-MCNC: 4 MMOL/L — SIGNIFICANT CHANGE UP (ref 3.5–5.3)
POTASSIUM SERPL-SCNC: 4 MMOL/L — SIGNIFICANT CHANGE UP (ref 3.5–5.3)
PROT SERPL-MCNC: 6.8 G/DL — SIGNIFICANT CHANGE UP (ref 6.6–8.7)
RBC # BLD: 2.26 M/UL — LOW (ref 4.2–5.8)
RBC # FLD: 11.7 % — SIGNIFICANT CHANGE UP (ref 10.3–14.5)
SODIUM SERPL-SCNC: 134 MMOL/L — LOW (ref 135–145)
SPECIMEN SOURCE: SIGNIFICANT CHANGE UP
TIBC SERPL-MCNC: 206 UG/DL — LOW (ref 220–430)
TRANSFERRIN SERPL-MCNC: 144 MG/DL — LOW (ref 180–329)
VIT B12 SERPL-MCNC: 1270 PG/ML — HIGH (ref 232–1245)
WBC # BLD: 3.08 K/UL — LOW (ref 3.8–10.5)
WBC # FLD AUTO: 3.08 K/UL — LOW (ref 3.8–10.5)

## 2019-10-06 PROCEDURE — 99232 SBSQ HOSP IP/OBS MODERATE 35: CPT

## 2019-10-06 RX ORDER — METOPROLOL TARTRATE 50 MG
25 TABLET ORAL
Refills: 0 | Status: DISCONTINUED | OUTPATIENT
Start: 2019-10-06 | End: 2019-10-08

## 2019-10-06 RX ORDER — LISINOPRIL 2.5 MG/1
5 TABLET ORAL DAILY
Refills: 0 | Status: DISCONTINUED | OUTPATIENT
Start: 2019-10-06 | End: 2019-10-10

## 2019-10-06 RX ORDER — PANTOPRAZOLE SODIUM 20 MG/1
40 TABLET, DELAYED RELEASE ORAL DAILY
Refills: 0 | Status: DISCONTINUED | OUTPATIENT
Start: 2019-10-06 | End: 2019-10-10

## 2019-10-06 RX ORDER — LABETALOL HCL 100 MG
10 TABLET ORAL ONCE
Refills: 0 | Status: COMPLETED | OUTPATIENT
Start: 2019-10-06 | End: 2019-10-06

## 2019-10-06 RX ORDER — SODIUM CHLORIDE 9 MG/ML
1000 INJECTION INTRAMUSCULAR; INTRAVENOUS; SUBCUTANEOUS
Refills: 0 | Status: DISCONTINUED | OUTPATIENT
Start: 2019-10-06 | End: 2019-10-06

## 2019-10-06 RX ADMIN — Medication 250 MILLIGRAM(S): at 05:35

## 2019-10-06 RX ADMIN — ENOXAPARIN SODIUM 90 MILLIGRAM(S): 100 INJECTION SUBCUTANEOUS at 10:26

## 2019-10-06 RX ADMIN — SODIUM CHLORIDE 50 MILLILITER(S): 9 INJECTION INTRAMUSCULAR; INTRAVENOUS; SUBCUTANEOUS at 05:36

## 2019-10-06 RX ADMIN — Medication 1 TABLET(S): at 10:26

## 2019-10-06 RX ADMIN — Medication 100 MILLIGRAM(S): at 10:26

## 2019-10-06 RX ADMIN — Medication 1 MILLIGRAM(S): at 10:26

## 2019-10-06 RX ADMIN — ENOXAPARIN SODIUM 90 MILLIGRAM(S): 100 INJECTION SUBCUTANEOUS at 21:17

## 2019-10-06 RX ADMIN — Medication 25 MILLIGRAM(S): at 21:17

## 2019-10-06 RX ADMIN — Medication 10 MILLIGRAM(S): at 20:06

## 2019-10-06 NOTE — PROGRESS NOTE ADULT - ASSESSMENT
1. anemia   no acute blood loss.  work up done , iron , B12, folate normal.  FOBT positive , will request for GI .  no active bleeding , will continue with AC .    2. Acute lower extremity DVT   probably provoked by immobility however not centain .  c/w lovenox for now.  keep extremity elevated .    3. Acute rhabdomyolysis   CK improved . c/w gentle hydration .    4. Febrile illness   status post LP, seen by ID .  PCR negative .  antibiotics stopped , will monitor closely .  patient does not have dysuria.     5. pancytopenia   due to alcohol.  will monitor.     6. transaminitis   likely due to alcoholic liver disease   improving .    7. Hyponatremia   likely due to dehydration   c/w gentle hydration .     8. s/p alcohol withdrawal   resolved.   continue to monitor.   to be seen by addiction specialist .      9. DVT prophylaxis   on systemic AC .    details discussed with patient who is alert and awake, all concerns answered .  also discussed with bedside nurse. will request PT consult.

## 2019-10-06 NOTE — PROGRESS NOTE ADULT - ASSESSMENT
This is a 67 y/o male with h/o HTN, EOTH use who has been laying on the bathroom floor for 2 days, hallucinating, weak with left LE edema. patient mother called EMS. patient currently being admitted with ETOH withdrawal, CIWA 5 status post ativan. Sepsis source unknown, status post LP. Rhabdomyolysis with KAYLEE and Left LE DVT  LP NOT C/W  MENINGITIS.   PT MORE Alert  KNOWS [place]  YEAR NO COMPLAINTS   CSF OP WBC  CSF PCR NEG  OBSERVE OFF ABX      WILL FOLLOW UP AS NEEDED PLEASE CALL IF QUESTIONS

## 2019-10-07 DIAGNOSIS — Z71.89 OTHER SPECIFIED COUNSELING: ICD-10-CM

## 2019-10-07 LAB
ANION GAP SERPL CALC-SCNC: 13 MMOL/L — SIGNIFICANT CHANGE UP (ref 5–17)
BUN SERPL-MCNC: <3 MG/DL — LOW (ref 8–20)
CALCIUM SERPL-MCNC: 8.3 MG/DL — LOW (ref 8.6–10.2)
CHLORIDE SERPL-SCNC: 92 MMOL/L — LOW (ref 98–107)
CO2 SERPL-SCNC: 25 MMOL/L — SIGNIFICANT CHANGE UP (ref 22–29)
CREAT SERPL-MCNC: 0.56 MG/DL — SIGNIFICANT CHANGE UP (ref 0.5–1.3)
CULTURE RESULTS: SIGNIFICANT CHANGE UP
GLUCOSE SERPL-MCNC: 93 MG/DL — SIGNIFICANT CHANGE UP (ref 70–115)
HCT VFR BLD CALC: 24.6 % — LOW (ref 39–50)
HGB BLD-MCNC: 8.1 G/DL — LOW (ref 13–17)
MCHC RBC-ENTMCNC: 32.9 GM/DL — SIGNIFICANT CHANGE UP (ref 32–36)
MCHC RBC-ENTMCNC: 34.2 PG — HIGH (ref 27–34)
MCV RBC AUTO: 103.8 FL — HIGH (ref 80–100)
PLATELET # BLD AUTO: 137 K/UL — LOW (ref 150–400)
POTASSIUM SERPL-MCNC: 3.3 MMOL/L — LOW (ref 3.5–5.3)
POTASSIUM SERPL-SCNC: 3.3 MMOL/L — LOW (ref 3.5–5.3)
RBC # BLD: 2.37 M/UL — LOW (ref 4.2–5.8)
RBC # FLD: 11.8 % — SIGNIFICANT CHANGE UP (ref 10.3–14.5)
SODIUM SERPL-SCNC: 130 MMOL/L — LOW (ref 135–145)
SPECIMEN SOURCE: SIGNIFICANT CHANGE UP
WBC # BLD: 3.18 K/UL — LOW (ref 3.8–10.5)
WBC # FLD AUTO: 3.18 K/UL — LOW (ref 3.8–10.5)

## 2019-10-07 PROCEDURE — 76700 US EXAM ABDOM COMPLETE: CPT | Mod: 26

## 2019-10-07 PROCEDURE — 99223 1ST HOSP IP/OBS HIGH 75: CPT

## 2019-10-07 PROCEDURE — 99233 SBSQ HOSP IP/OBS HIGH 50: CPT

## 2019-10-07 RX ORDER — SODIUM CHLORIDE 9 MG/ML
1000 INJECTION INTRAMUSCULAR; INTRAVENOUS; SUBCUTANEOUS
Refills: 0 | Status: DISCONTINUED | OUTPATIENT
Start: 2019-10-07 | End: 2019-10-08

## 2019-10-07 RX ORDER — POTASSIUM CHLORIDE 20 MEQ
40 PACKET (EA) ORAL DAILY
Refills: 0 | Status: DISCONTINUED | OUTPATIENT
Start: 2019-10-07 | End: 2019-10-15

## 2019-10-07 RX ADMIN — ENOXAPARIN SODIUM 90 MILLIGRAM(S): 100 INJECTION SUBCUTANEOUS at 10:01

## 2019-10-07 RX ADMIN — Medication 250 MILLIGRAM(S): at 06:42

## 2019-10-07 RX ADMIN — LISINOPRIL 5 MILLIGRAM(S): 2.5 TABLET ORAL at 06:42

## 2019-10-07 RX ADMIN — Medication 40 MILLIEQUIVALENT(S): at 12:19

## 2019-10-07 RX ADMIN — Medication 1 TABLET(S): at 12:19

## 2019-10-07 RX ADMIN — PANTOPRAZOLE SODIUM 40 MILLIGRAM(S): 20 TABLET, DELAYED RELEASE ORAL at 12:19

## 2019-10-07 RX ADMIN — Medication 1 MILLIGRAM(S): at 12:19

## 2019-10-07 RX ADMIN — SODIUM CHLORIDE 100 MILLILITER(S): 9 INJECTION INTRAMUSCULAR; INTRAVENOUS; SUBCUTANEOUS at 09:38

## 2019-10-07 RX ADMIN — Medication 250 MILLIGRAM(S): at 17:00

## 2019-10-07 RX ADMIN — ENOXAPARIN SODIUM 90 MILLIGRAM(S): 100 INJECTION SUBCUTANEOUS at 17:00

## 2019-10-07 RX ADMIN — Medication 25 MILLIGRAM(S): at 17:00

## 2019-10-07 RX ADMIN — Medication 25 MILLIGRAM(S): at 10:02

## 2019-10-07 NOTE — PHYSICAL THERAPY INITIAL EVALUATION ADULT - ADDITIONAL COMMENTS
Pt lives in an apartment with 4 steps to enter with  rails and a flight or  stairs inside with  rails.  Pt owns medical equipment: commode, shower chair, cane, RW  Pt lives with: mother, brother and sister in law who per pt are all able to provide 24 hour care.

## 2019-10-07 NOTE — CONSULT NOTE ADULT - SUBJECTIVE AND OBJECTIVE BOX
HISTORY OF PRESENT ILLNESS:  This is a 68y old Male with a past medical history significant for Etoh abuse, HTN who was found on the bathroom floor, hallucinating and weak. Patient was admitted for alcoholic withdrawal and sepsis. Found to have ARF, Rhabdomyolysis, LLE DVT, severe anemia, alcoholic hepatitis on admission. Patient has worsening anemia. Iron studies, B12, folate done. Patient was fecal occult positive.       REVIEW OF SYSTEMS:  Constitutional:  No unintentional weight loss, fevers, chills or night sweats	  Eyes: No eye pain, redness, discharge, or proptosis  ENMT: No sore throat, ear pain, mouth sores, or swollen glands in the neck  Respiratory: No dyspnea, cough or wheezing  Cardiovascular: No chest pain, dyspnea on exertion, or orthopnea  Gastrointestinal:	Please see HPI  Genitourinary: No dysuria or hematuria  Neurological:	 No changes in sleep/wake cycle, convulsions, confusion, dizziness or lightheadedness  Psychiatric: No changes in personality or emotional problems   Hematology: No easy bruising   Endocrine: No hot or cold flashes or deepening of voice	  All other review of systems were completed and were otherwise negative save what is reported in the HPI.    PAST MEDICAL/SURGICAL HISTORY:  Hypertension  H/O exploratory laparotomy    SOCIAL HISTORY:  - TOBACCO:  - ALCOHOL:  - ILLICIT DRUG USE: Denies    FAMILY HISTORY:  No known history of gastrointestinal or liver disease;      HOME MEDICATIONS:  furosemide 40 mg oral tablet:  orally  (16 Jun 2015 21:46)  lisinopril:  orally  (16 Jun 2015 21:46)    INPATIENT MEDICATIONS:  MEDICATIONS  (STANDING):  enoxaparin Injectable 90 milliGRAM(s) SubCutaneous two times a day  folic acid 1 milliGRAM(s) Oral daily  lisinopril 5 milliGRAM(s) Oral daily  metoprolol tartrate 25 milliGRAM(s) Oral two times a day  multivitamin 1 Tablet(s) Oral daily  pantoprazole  Injectable 40 milliGRAM(s) IV Push daily  potassium chloride    Tablet ER 40 milliEquivalent(s) Oral daily  saccharomyces boulardii 250 milliGRAM(s) Oral two times a day  sodium chloride 0.9%. 1000 milliLiter(s) (100 mL/Hr) IV Continuous <Continuous>    MEDICATIONS  (PRN):  LORazepam     Tablet 1 milliGRAM(s) Oral every 2 hours PRN CIWA-Ar score increase by 2 points and a total score of 7 or less  LORazepam   Injectable 1 milliGRAM(s) IV Push every 1 hour PRN CIWA-Ar score 8 or greater    ALLERGIES:  No Known Allergies    VITAL SIGNS LAST 24 HOURS:  T(C): 36.7 (07 Oct 2019 06:38), Max: 36.7 (06 Oct 2019 15:48)  T(F): 98.1 (07 Oct 2019 06:38), Max: 98.1 (06 Oct 2019 21:14)  HR: 89 (07 Oct 2019 06:38) (70 - 94)  BP: 138/66 (07 Oct 2019 06:38) (130/72 - 185/104)  BP(mean): --  RR: 16 (07 Oct 2019 06:38) (16 - 20)  SpO2: 100% (07 Oct 2019 06:38) (85% - 100%)      10-06-19 @ 07:01  -  10-07-19 @ 07:00  --------------------------------------------------------  IN: 410 mL / OUT: 950 mL / NET: -540 mL        10-06-19 @ 07:01  -  10-07-19 @ 07:00  --------------------------------------------------------  IN: 410 mL / OUT: 950 mL / NET: -540 mL          PHYSICAL EXAM:  Constitutional: Well-developed, well-nourished, in no apparent distress  Eyes: Sclerae anicteric, conjunctivae normal  ENMT: Mucus membranes moist, no oropharyngeal thrush noted  Neck: No thyroid nodules appreciated, no significant cervical or supraclavicular lymphadenopathy  Respiratory: Breathing nonlabored; clear to auscultation  Cardiovascular: Regular rate and rhythm  Gastrointestinal: Soft, nontender, nondistended, normoactive bowel sounds; no hepatosplenomegaly appreciated; no rebound tenderness or involuntary guarding  Extremities: No clubbing, cyanosis or edema  Neurological: Alert and oriented to person, place and time; no asterixis  Skin: No jaundice  Lymph Nodes: No significant lymphadenopathy  Musculoskeletal: No significant peripheral atrophy  Psychiatric: Affect and mood appropriate      LABS:                        8.1    3.18  )-----------( 137      ( 07 Oct 2019 05:59 )             24.6       10-07    130<L>  |  92<L>  |  <3.0<L>  ----------------------------<  93  3.3<L>   |  25.0  |  0.56    Ca    8.3<L>      07 Oct 2019 05:59    TPro  6.8  /  Alb  2.6<L>  /  TBili  1.4  /  DBili  0.9<H>  /  AST  162<H>  /  ALT  59<H>  /  AlkPhos  120  10-06    LIVER FUNCTIONS - ( 06 Oct 2019 08:23 )  Alb: 2.6 g/dL / Pro: 6.8 g/dL / ALK PHOS: 120 U/L / ALT: 59 U/L / AST: 162 U/L / GGT: x               Culture - Urine (collected 05 Oct 2019 03:52)  Source: .Urine  Final Report (06 Oct 2019 10:07):    No growth    Culture - Blood (collected 04 Oct 2019 20:30)  Source: .Blood  Preliminary Report (06 Oct 2019 21:01):    No growth at 48 hours    Culture - Blood (collected 04 Oct 2019 20:30)  Source: .Blood  Preliminary Report (06 Oct 2019 21:00):    No growth at 48 hours      IMAGING:  < from: US Gallbladder (10.03.19 @ 16:53) >  FINDINGS:    Bile ducts: Normal caliber. Common bile duct measures 4 mm.     Gallbladder: No cholelithiasis, wall thickening or pericholecystic fluid.       IMPRESSION:     No cholelithiasis or evidence of acute cholecystitis.    < end of copied text > HISTORY OF PRESENT ILLNESS:  This is a 68y old Male with a past medical history significant for Etoh abuse, HTN who was found on the bathroom floor, hallucinating and weak. Patient was admitted for alcoholic withdrawal and sepsis. Found to have ARF, Rhabdomyolysis, LLE DVT, severe anemia, alcoholic hepatitis on admission. Patient has worsening anemia during the admission. Patient was fecal occult positive. He denies rectal bleeding, diarrhea and melena. He has been having light brown bowel movements during the admission. He denies abdominal pain. He has never had a colonoscopy or an EGD. He has a brother who was diagnosed colon cancer recently at the age of 72.  He had Iron studies, B12, folate were done. He states that he drinks only 3-4 beers a week.       REVIEW OF SYSTEMS:  Constitutional:  No unintentional weight loss, fevers, chills or night sweats	  Eyes: No eye pain, redness, discharge, or proptosis  ENMT: No sore throat, ear pain, mouth sores, or swollen glands in the neck  Respiratory: No dyspnea, cough or wheezing  Cardiovascular: No chest pain, dyspnea on exertion, or orthopnea  Gastrointestinal:	Please see HPI  Genitourinary: No dysuria or hematuria  Neurological:	 No changes in sleep/wake cycle, convulsions, confusion, dizziness or lightheadedness  Psychiatric: No changes in personality or emotional problems   Hematology: No easy bruising   Endocrine: No hot or cold flashes or deepening of voice	  All other review of systems were completed and were otherwise negative save what is reported in the HPI.    PAST MEDICAL/SURGICAL HISTORY:  Hypertension  H/O exploratory laparotomy    SOCIAL HISTORY:  - ILLICIT DRUG USE: Denies    FAMILY HISTORY:  No known history of gastrointestinal or liver disease;      HOME MEDICATIONS:  furosemide 40 mg oral tablet:  orally  (16 Jun 2015 21:46)  lisinopril:  orally  (16 Jun 2015 21:46)    INPATIENT MEDICATIONS:  MEDICATIONS  (STANDING):  enoxaparin Injectable 90 milliGRAM(s) SubCutaneous two times a day  folic acid 1 milliGRAM(s) Oral daily  lisinopril 5 milliGRAM(s) Oral daily  metoprolol tartrate 25 milliGRAM(s) Oral two times a day  multivitamin 1 Tablet(s) Oral daily  pantoprazole  Injectable 40 milliGRAM(s) IV Push daily  potassium chloride    Tablet ER 40 milliEquivalent(s) Oral daily  saccharomyces boulardii 250 milliGRAM(s) Oral two times a day  sodium chloride 0.9%. 1000 milliLiter(s) (100 mL/Hr) IV Continuous <Continuous>    MEDICATIONS  (PRN):  LORazepam     Tablet 1 milliGRAM(s) Oral every 2 hours PRN CIWA-Ar score increase by 2 points and a total score of 7 or less  LORazepam   Injectable 1 milliGRAM(s) IV Push every 1 hour PRN CIWA-Ar score 8 or greater    ALLERGIES:  No Known Allergies    VITAL SIGNS LAST 24 HOURS:  T(C): 36.7 (07 Oct 2019 06:38), Max: 36.7 (06 Oct 2019 15:48)  T(F): 98.1 (07 Oct 2019 06:38), Max: 98.1 (06 Oct 2019 21:14)  HR: 89 (07 Oct 2019 06:38) (70 - 94)  BP: 138/66 (07 Oct 2019 06:38) (130/72 - 185/104)  BP(mean): --  RR: 16 (07 Oct 2019 06:38) (16 - 20)  SpO2: 100% (07 Oct 2019 06:38) (85% - 100%)      10-06-19 @ 07:01  -  10-07-19 @ 07:00  --------------------------------------------------------  IN: 410 mL / OUT: 950 mL / NET: -540 mL        10-06-19 @ 07:01  -  10-07-19 @ 07:00  --------------------------------------------------------  IN: 410 mL / OUT: 950 mL / NET: -540 mL        PHYSICAL EXAM:  Constitutional: Well-developed, well-nourished, in no apparent distress  Eyes: Sclerae anicteric, conjunctivae normal  ENMT: Mucus membranes moist, no oropharyngeal thrush noted  Neck: No thyroid nodules appreciated, no significant cervical or supraclavicular lymphadenopathy  Respiratory: Breathing nonlabored; clear to auscultation  Cardiovascular: Regular rate and rhythm  Gastrointestinal: Soft, nontender, nondistended, normoactive bowel sound  Extremities: No clubbing, cyanosis or edema  Neurological: Alert and oriented to person, place and time; no asterixis  Skin: No jaundice  Lymph Nodes: No significant lymphadenopathy  Musculoskeletal: No significant peripheral atrophy  Psychiatric: Affect and mood appropriate      LABS:                        8.1    3.18  )-----------( 137      ( 07 Oct 2019 05:59 )             24.6       10-07    130<L>  |  92<L>  |  <3.0<L>  ----------------------------<  93  3.3<L>   |  25.0  |  0.56    Ca    8.3<L>      07 Oct 2019 05:59    TPro  6.8  /  Alb  2.6<L>  /  TBili  1.4  /  DBili  0.9<H>  /  AST  162<H>  /  ALT  59<H>  /  AlkPhos  120  10-06    LIVER FUNCTIONS - ( 06 Oct 2019 08:23 )  Alb: 2.6 g/dL / Pro: 6.8 g/dL / ALK PHOS: 120 U/L / ALT: 59 U/L / AST: 162 U/L / GGT: x               Culture - Urine (collected 05 Oct 2019 03:52)  Source: .Urine  Final Report (06 Oct 2019 10:07):    No growth    Culture - Blood (collected 04 Oct 2019 20:30)  Source: .Blood  Preliminary Report (06 Oct 2019 21:01):    No growth at 48 hours    Culture - Blood (collected 04 Oct 2019 20:30)  Source: .Blood  Preliminary Report (06 Oct 2019 21:00):    No growth at 48 hours      IMAGING:  < from: US Gallbladder (10.03.19 @ 16:53) >  FINDINGS:    Bile ducts: Normal caliber. Common bile duct measures 4 mm.     Gallbladder: No cholelithiasis, wall thickening or pericholecystic fluid.       IMPRESSION:     No cholelithiasis or evidence of acute cholecystitis.    < end of copied text >

## 2019-10-07 NOTE — PROGRESS NOTE ADULT - ASSESSMENT
1. anemia   no acute blood loss.  work up done , iron , B12, folate normal.  FOBT positive , seen by GI , US abdomen done no cirrhosis .  will need scope later this week .  no active bleeding , will continue with AC .    2. Acute lower extremity DVT   probably provoked by immobility however not centain .  c/w lovenox for now.  keep extremity elevated .    3. Acute rhabdomyolysis   CK improved . c/w gentle hydration .    4. Febrile illness   status post LP, seen by ID .  PCR negative .  antibiotics stopped , will monitor closely .  fever resolved .    5. pancytopenia   due to alcohol.  will monitor.     6. transaminitis   likely due to alcoholic liver disease   improving .    7. Hyponatremia   likely due to dehydration or beer potomania   c/w gentle hydration .     8. Hypokalemia   on oral supplements .    9. s/p alcohol withdrawal   resolved.   continue to monitor.   to be seen by addiction specialist .  patient stated that he only only drinks 2-3 drinks a week       10. DVT prophylaxis   on systemic AC .

## 2019-10-07 NOTE — PHYSICAL THERAPY INITIAL EVALUATION ADULT - GAIT TRAINING, PT EVAL
Time Frame: 7  days   Goal: Pt will ambulate 150 feet with RW with modified independence.  Stairs: pt will navigate 13 stairs with kaylee rails independently.

## 2019-10-07 NOTE — PHYSICAL THERAPY INITIAL EVALUATION ADULT - GAIT DEVIATIONS NOTED, PT EVAL
decreased velocity of limb motion/decreased ladi/Trunk flexion/decreased weight-shifting ability/decreased stride length

## 2019-10-07 NOTE — CONSULT NOTE ADULT - ASSESSMENT
68y old Male with a past medical history significant for Etoh abuse, HTN who was found on the bathroom floor, hallucinating and weak. Patient was admitted for alcoholic withdrawal and sepsis. Found to have ARF, Rhabdomyolysis, LLE DVT, severe anemia, alcoholic hepatitis on admission.     Alcoholic hepatitis ?? cirrhosis.   Alb 2.8 plts low normal  - monitor LFTs  - US abdomen     Worsening anemia. Likely multifactorial related to liver disease, anemia of chronic inflammation and IVF. No signs of overt active bleeding.   - monitor CBC  - EGD later this week to screen for varices and r/o upper gi pathology     Thanks

## 2019-10-08 LAB
ALBUMIN SERPL ELPH-MCNC: 2.9 G/DL — LOW (ref 3.3–5.2)
ALP SERPL-CCNC: 104 U/L — SIGNIFICANT CHANGE UP (ref 40–120)
ALT FLD-CCNC: 48 U/L — HIGH
ANION GAP SERPL CALC-SCNC: 11 MMOL/L — SIGNIFICANT CHANGE UP (ref 5–17)
ANISOCYTOSIS BLD QL: SLIGHT — SIGNIFICANT CHANGE UP
AST SERPL-CCNC: 91 U/L — HIGH
B BURGDOR DNA SPEC QL NAA+PROBE: NEGATIVE — SIGNIFICANT CHANGE UP
BILIRUB DIRECT SERPL-MCNC: 0.3 MG/DL — SIGNIFICANT CHANGE UP (ref 0–0.3)
BILIRUB INDIRECT FLD-MCNC: 0.5 MG/DL — SIGNIFICANT CHANGE UP (ref 0.2–1)
BILIRUB SERPL-MCNC: 0.8 MG/DL — SIGNIFICANT CHANGE UP (ref 0.4–2)
BUN SERPL-MCNC: 3 MG/DL — LOW (ref 8–20)
CALCIUM SERPL-MCNC: 8.7 MG/DL — SIGNIFICANT CHANGE UP (ref 8.6–10.2)
CHLORIDE SERPL-SCNC: 96 MMOL/L — LOW (ref 98–107)
CO2 SERPL-SCNC: 26 MMOL/L — SIGNIFICANT CHANGE UP (ref 22–29)
CREAT SERPL-MCNC: 0.6 MG/DL — SIGNIFICANT CHANGE UP (ref 0.5–1.3)
CULTURE RESULTS: SIGNIFICANT CHANGE UP
CULTURE RESULTS: SIGNIFICANT CHANGE UP
GLUCOSE SERPL-MCNC: 89 MG/DL — SIGNIFICANT CHANGE UP (ref 70–115)
HCT VFR BLD CALC: 27.5 % — LOW (ref 39–50)
HGB BLD-MCNC: 8.9 G/DL — LOW (ref 13–17)
HYPOCHROMIA BLD QL: SLIGHT — SIGNIFICANT CHANGE UP
MACROCYTES BLD QL: SLIGHT — SIGNIFICANT CHANGE UP
MANUAL SMEAR VERIFICATION: SIGNIFICANT CHANGE UP
MCHC RBC-ENTMCNC: 32.4 GM/DL — SIGNIFICANT CHANGE UP (ref 32–36)
MCHC RBC-ENTMCNC: 34.4 PG — HIGH (ref 27–34)
MCV RBC AUTO: 106.2 FL — HIGH (ref 80–100)
MICROCYTES BLD QL: SLIGHT — SIGNIFICANT CHANGE UP
PLAT MORPH BLD: NORMAL — SIGNIFICANT CHANGE UP
PLATELET # BLD AUTO: 180 K/UL — SIGNIFICANT CHANGE UP (ref 150–400)
PLATELET COUNT - ESTIMATE: NORMAL — SIGNIFICANT CHANGE UP
POIKILOCYTOSIS BLD QL AUTO: SLIGHT — SIGNIFICANT CHANGE UP
POTASSIUM SERPL-MCNC: 4 MMOL/L — SIGNIFICANT CHANGE UP (ref 3.5–5.3)
POTASSIUM SERPL-SCNC: 4 MMOL/L — SIGNIFICANT CHANGE UP (ref 3.5–5.3)
PROT SERPL-MCNC: 7 G/DL — SIGNIFICANT CHANGE UP (ref 6.6–8.7)
RBC # BLD: 2.59 M/UL — LOW (ref 4.2–5.8)
RBC # FLD: 11.8 % — SIGNIFICANT CHANGE UP (ref 10.3–14.5)
RBC BLD AUTO: NORMAL — SIGNIFICANT CHANGE UP
SODIUM SERPL-SCNC: 133 MMOL/L — LOW (ref 135–145)
SPECIMEN SOURCE: SIGNIFICANT CHANGE UP
SPECIMEN SOURCE: SIGNIFICANT CHANGE UP
WBC # BLD: 3.3 K/UL — LOW (ref 3.8–10.5)
WBC # FLD AUTO: 3.3 K/UL — LOW (ref 3.8–10.5)

## 2019-10-08 PROCEDURE — 99233 SBSQ HOSP IP/OBS HIGH 50: CPT

## 2019-10-08 RX ORDER — LISINOPRIL 2.5 MG/1
5 TABLET ORAL ONCE
Refills: 0 | Status: COMPLETED | OUTPATIENT
Start: 2019-10-08 | End: 2019-10-08

## 2019-10-08 RX ORDER — IRON SUCROSE 20 MG/ML
200 INJECTION, SOLUTION INTRAVENOUS EVERY 24 HOURS
Refills: 0 | Status: COMPLETED | OUTPATIENT
Start: 2019-10-08 | End: 2019-10-09

## 2019-10-08 RX ORDER — METOPROLOL TARTRATE 50 MG
50 TABLET ORAL
Refills: 0 | Status: DISCONTINUED | OUTPATIENT
Start: 2019-10-08 | End: 2019-10-10

## 2019-10-08 RX ADMIN — Medication 50 MILLIGRAM(S): at 17:36

## 2019-10-08 RX ADMIN — LISINOPRIL 5 MILLIGRAM(S): 2.5 TABLET ORAL at 17:35

## 2019-10-08 RX ADMIN — Medication 25 MILLIGRAM(S): at 05:49

## 2019-10-08 RX ADMIN — ENOXAPARIN SODIUM 90 MILLIGRAM(S): 100 INJECTION SUBCUTANEOUS at 05:48

## 2019-10-08 RX ADMIN — Medication 250 MILLIGRAM(S): at 17:36

## 2019-10-08 RX ADMIN — LISINOPRIL 5 MILLIGRAM(S): 2.5 TABLET ORAL at 05:48

## 2019-10-08 RX ADMIN — ENOXAPARIN SODIUM 90 MILLIGRAM(S): 100 INJECTION SUBCUTANEOUS at 17:36

## 2019-10-08 RX ADMIN — Medication 40 MILLIEQUIVALENT(S): at 09:56

## 2019-10-08 RX ADMIN — Medication 1 TABLET(S): at 09:56

## 2019-10-08 RX ADMIN — Medication 250 MILLIGRAM(S): at 05:48

## 2019-10-08 RX ADMIN — PANTOPRAZOLE SODIUM 40 MILLIGRAM(S): 20 TABLET, DELAYED RELEASE ORAL at 07:35

## 2019-10-08 RX ADMIN — IRON SUCROSE 220 MILLIGRAM(S): 20 INJECTION, SOLUTION INTRAVENOUS at 14:33

## 2019-10-08 RX ADMIN — Medication 1 MILLIGRAM(S): at 09:57

## 2019-10-08 NOTE — PROGRESS NOTE ADULT - ASSESSMENT
68y old Male with a past medical history significant for EtOH abuse , HTN family history of colon cancer who was found on the bathroom floor, hallucinating and weak. Patient was admitted for alcoholic withdrawal and sepsis. Found to have ARF, Rhabdomyolysis, LLE DVT, severe anemia, alcoholic hepatitis on admission.         1. Anemia likely cronic   no acute blood loss, occult blood positive   iron level low normal   will add iv venofer x2 doses , start folic acid   GI follow up appreciated eventual work up EGD this week     2. Acute lower extremity DVT   probably provoked by immobility?   c/w lovenox for now, if hb drops and not candidate  for anticoagulation consider IVC filter ( by IR )       3. Acute rhabdomyolysis   CK improved , cont iv hydration   repeat cpk in am     4. Febrile illness   status post LP, seen by ID .  PCR negative .  antibiotics stopped   improved     5. Pancytopenia   due to alcohol.  will monitor.     6. Transaminitis   likely due to alcoholic liver disease   improving     7. Hyponatremia   better , cont iv fluid     8. Hypokalemia   on oral supplements   improved     9. s/p alcohol withdrawal   resolved.   SW referal for rehab     ambulate with PT

## 2019-10-08 NOTE — PROGRESS NOTE ADULT - ASSESSMENT
68y old Male with a past medical history significant for Etoh abuse, HTN family history of colon cancer who was found on the bathroom floor, hallucinating and weak. Patient was admitted for alcoholic withdrawal and sepsis. Found to have ARF, Rhabdomyolysis, LLE DVT, severe anemia, alcoholic hepatitis on admission.     Alcoholic hepatitis. No evidence of cirrhosis. Plts improved.   Alb 2.8 plts low normal  - monitor LFTs    Worsening anemia. Likely multifactorial related to anemia of chronic inflammation and IVF. Patient with some melena overnight.   - monitor CBC  - EGD later this week     Thanks

## 2019-10-09 DIAGNOSIS — D50.0 IRON DEFICIENCY ANEMIA SECONDARY TO BLOOD LOSS (CHRONIC): ICD-10-CM

## 2019-10-09 LAB
CK SERPL-CCNC: 117 U/L — SIGNIFICANT CHANGE UP (ref 30–200)
CULTURE RESULTS: SIGNIFICANT CHANGE UP
CULTURE RESULTS: SIGNIFICANT CHANGE UP
HCT VFR BLD CALC: 23.1 % — LOW (ref 39–50)
HGB BLD-MCNC: 7.5 G/DL — LOW (ref 13–17)
MCHC RBC-ENTMCNC: 32.5 GM/DL — SIGNIFICANT CHANGE UP (ref 32–36)
MCHC RBC-ENTMCNC: 34.9 PG — HIGH (ref 27–34)
MCV RBC AUTO: 107.4 FL — HIGH (ref 80–100)
PLATELET # BLD AUTO: 217 K/UL — SIGNIFICANT CHANGE UP (ref 150–400)
RBC # BLD: 2.15 M/UL — LOW (ref 4.2–5.8)
RBC # FLD: 12 % — SIGNIFICANT CHANGE UP (ref 10.3–14.5)
SPECIMEN SOURCE: SIGNIFICANT CHANGE UP
SPECIMEN SOURCE: SIGNIFICANT CHANGE UP
WBC # BLD: 4.03 K/UL — SIGNIFICANT CHANGE UP (ref 3.8–10.5)
WBC # FLD AUTO: 4.03 K/UL — SIGNIFICANT CHANGE UP (ref 3.8–10.5)

## 2019-10-09 PROCEDURE — 99232 SBSQ HOSP IP/OBS MODERATE 35: CPT

## 2019-10-09 PROCEDURE — 99233 SBSQ HOSP IP/OBS HIGH 50: CPT

## 2019-10-09 RX ADMIN — ENOXAPARIN SODIUM 90 MILLIGRAM(S): 100 INJECTION SUBCUTANEOUS at 10:57

## 2019-10-09 RX ADMIN — PANTOPRAZOLE SODIUM 40 MILLIGRAM(S): 20 TABLET, DELAYED RELEASE ORAL at 10:57

## 2019-10-09 RX ADMIN — IRON SUCROSE 220 MILLIGRAM(S): 20 INJECTION, SOLUTION INTRAVENOUS at 13:29

## 2019-10-09 RX ADMIN — Medication 50 MILLIGRAM(S): at 05:26

## 2019-10-09 RX ADMIN — Medication 1 MILLIGRAM(S): at 10:57

## 2019-10-09 RX ADMIN — Medication 250 MILLIGRAM(S): at 17:55

## 2019-10-09 RX ADMIN — Medication 50 MILLIGRAM(S): at 17:55

## 2019-10-09 RX ADMIN — Medication 40 MILLIEQUIVALENT(S): at 10:57

## 2019-10-09 RX ADMIN — Medication 1 TABLET(S): at 10:56

## 2019-10-09 RX ADMIN — Medication 250 MILLIGRAM(S): at 05:25

## 2019-10-09 RX ADMIN — LISINOPRIL 5 MILLIGRAM(S): 2.5 TABLET ORAL at 05:26

## 2019-10-09 NOTE — CHART NOTE - NSCHARTNOTEFT_GEN_A_CORE
Upon Nutritional Assessment by the Registered Dietitian your patient was determined to meet criteria / has evidence of the following diagnosis/diagnoses:          [x ]  Moderate Protein Calorie Malnutrition          Findings as based on:  •  Comprehensive nutrition assessment and consultation  •  Calorie counts (nutrient intake analysis)  •  Food acceptance and intake status from observations by staff  •  Follow up  •  Patient education  •  Intervention secondary to interdisciplinary rounds  •   concerns      Treatment:    The following diet has been recommended:  RX: Ensure bid    PROVIDER Section:     By signing this assessment you are acknowledging and agree with the diagnosis/diagnoses assigned by the Registered Dietitian    Comments:

## 2019-10-09 NOTE — SBIRT NOTE ADULT - NSSBIRTALCPOSREINDET_GEN_A_CORE
Positive reinforcement Positive reinforcement .   Patient educated on the safe drinking guidelines. Patient reported that he does not drink often although the reason for admission discussed. Patient denied having issues with ETOH. SW on [facility] provided patient with educational material.

## 2019-10-09 NOTE — PROGRESS NOTE ADULT - ASSESSMENT
1. anemia   potentially a GI source .  acute drop in hemoglobin in last 24 hours .  no need for transfusion .  had melanotic stools yesterday , no active bleeding now.   will continue with AC for now , however if he does not tolerate will do IVC filter placement .  lovenox will be held in evening prior to EGD .   iron, B12, folate within normal limits .       2. Acute lower extremity DVT   probably provoked by immobility however not certain .  c/w lovenox for now.  keep extremity elevated .  IVC filter if continues to have anemia without obvious cause .    3. Acute rhabdomyolysis   resolved .    4. Febrile illness   resolved .  work up negative so far .  no antibiotics as per ID .     5. pancytopenia   due to alcohol.  platelet and WBC count has normalized , still anemic .  will monitor .    6. transaminitis   likely due to alcoholic liver disease   improving .    7. Hyponatremia   likely due to dehydration or beer potomania   will monitor .   c/w oral hydration .     8. Hypokalemia   resolved .     9. s/p alcohol withdrawal   resolved.   continue to monitor.     10. DVT prophylaxis   on systemic AC .    11. Disposition   seen by PT , recs were to do home with assistance .  discussed with case management to see if patient would be agreeable for PAULA .     details discussed with patient , he is in agreement with plan .

## 2019-10-09 NOTE — DIETITIAN INITIAL EVALUATION ADULT. - PERTINENT LABORATORY DATA
10-08 Na133 mmol/L<L> Glu 89 mg/dL K+ 4.0 mmol/L Cr  0.60 mg/dL BUN 3.0 mg/dL<L> Phos n/a   Alb 2.9 g/dL<L> PAB n/a   H/H 7.5/23.1

## 2019-10-09 NOTE — DIETITIAN INITIAL EVALUATION ADULT. - ETIOLOGY
related to inability to consume sufficient protein energy intake with consumption of 2 meals daily in setting of ETOH abuse

## 2019-10-09 NOTE — DIETITIAN INITIAL EVALUATION ADULT. - MALNUTRITION
NFPE performed- mild muscle wasting at temple, clavicle, shoulder.  Mild fat loss in orbital region. moderate (social/environmental)

## 2019-10-10 ENCOUNTER — TRANSCRIPTION ENCOUNTER (OUTPATIENT)
Age: 69
End: 2019-10-10

## 2019-10-10 LAB
ABO RH CONFIRMATION: SIGNIFICANT CHANGE UP
ANION GAP SERPL CALC-SCNC: 9 MMOL/L — SIGNIFICANT CHANGE UP (ref 5–17)
BLD GP AB SCN SERPL QL: SIGNIFICANT CHANGE UP
BUN SERPL-MCNC: 18 MG/DL — SIGNIFICANT CHANGE UP (ref 8–20)
CALCIUM SERPL-MCNC: 8.7 MG/DL — SIGNIFICANT CHANGE UP (ref 8.6–10.2)
CHLORIDE SERPL-SCNC: 101 MMOL/L — SIGNIFICANT CHANGE UP (ref 98–107)
CO2 SERPL-SCNC: 26 MMOL/L — SIGNIFICANT CHANGE UP (ref 22–29)
CREAT SERPL-MCNC: 0.65 MG/DL — SIGNIFICANT CHANGE UP (ref 0.5–1.3)
GLUCOSE SERPL-MCNC: 96 MG/DL — SIGNIFICANT CHANGE UP (ref 70–115)
HCT VFR BLD CALC: 17.2 % — CRITICAL LOW (ref 39–50)
HGB BLD-MCNC: 5.6 G/DL — CRITICAL LOW (ref 13–17)
HGB BLD-MCNC: 8.4 G/DL — LOW (ref 13–17)
MCHC RBC-ENTMCNC: 32.6 GM/DL — SIGNIFICANT CHANGE UP (ref 32–36)
MCHC RBC-ENTMCNC: 35.2 PG — HIGH (ref 27–34)
MCV RBC AUTO: 108.2 FL — HIGH (ref 80–100)
PLATELET # BLD AUTO: 208 K/UL — SIGNIFICANT CHANGE UP (ref 150–400)
POTASSIUM SERPL-MCNC: 4.1 MMOL/L — SIGNIFICANT CHANGE UP (ref 3.5–5.3)
POTASSIUM SERPL-SCNC: 4.1 MMOL/L — SIGNIFICANT CHANGE UP (ref 3.5–5.3)
RBC # BLD: 1.59 M/UL — LOW (ref 4.2–5.8)
RBC # FLD: 12.3 % — SIGNIFICANT CHANGE UP (ref 10.3–14.5)
SODIUM SERPL-SCNC: 136 MMOL/L — SIGNIFICANT CHANGE UP (ref 135–145)
WBC # BLD: 3.54 K/UL — LOW (ref 3.8–10.5)
WBC # FLD AUTO: 3.54 K/UL — LOW (ref 3.8–10.5)

## 2019-10-10 PROCEDURE — 99233 SBSQ HOSP IP/OBS HIGH 50: CPT

## 2019-10-10 RX ORDER — SOD SULF/SODIUM/NAHCO3/KCL/PEG
4000 SOLUTION, RECONSTITUTED, ORAL ORAL ONCE
Refills: 0 | Status: COMPLETED | OUTPATIENT
Start: 2019-10-10 | End: 2019-10-10

## 2019-10-10 RX ORDER — PANTOPRAZOLE SODIUM 20 MG/1
40 TABLET, DELAYED RELEASE ORAL
Refills: 0 | Status: DISCONTINUED | OUTPATIENT
Start: 2019-10-10 | End: 2019-10-14

## 2019-10-10 RX ADMIN — LISINOPRIL 5 MILLIGRAM(S): 2.5 TABLET ORAL at 06:34

## 2019-10-10 RX ADMIN — Medication 50 MILLIGRAM(S): at 06:34

## 2019-10-10 RX ADMIN — Medication 1 TABLET(S): at 14:06

## 2019-10-10 RX ADMIN — Medication 250 MILLIGRAM(S): at 17:23

## 2019-10-10 RX ADMIN — PANTOPRAZOLE SODIUM 40 MILLIGRAM(S): 20 TABLET, DELAYED RELEASE ORAL at 17:23

## 2019-10-10 RX ADMIN — PANTOPRAZOLE SODIUM 40 MILLIGRAM(S): 20 TABLET, DELAYED RELEASE ORAL at 10:57

## 2019-10-10 RX ADMIN — Medication 40 MILLIEQUIVALENT(S): at 14:08

## 2019-10-10 RX ADMIN — Medication 250 MILLIGRAM(S): at 06:34

## 2019-10-10 RX ADMIN — Medication 1 MILLIGRAM(S): at 14:07

## 2019-10-10 RX ADMIN — Medication 4000 MILLILITER(S): at 16:05

## 2019-10-10 NOTE — CHART NOTE - NSCHARTNOTEFT_GEN_A_CORE
HPI:  pt. is lying in bed tired looking and somewhat sedated, does not contribute to history. history of obtained from ED attending and patient chart.     This is a 69 y/o male with h/o HTN, EOTH use who has been laying on the bathroom floor for 2 days, hallucinating, weak with left LE edema. patient mother called EMS. patient currently being admitted with ETOH withdrawal, CIWA 5 status post ativan. Sepsis source unknown, status post LP. Rhabdomyolysis with KAYLEE and Left LE DVT. (03 Oct 2019 20:48)    Interval HPI:   Patient seen at bedside for blood transfusion consent, patient verbalized understanding of risks and benefits in regard to receiving blood transfusion.

## 2019-10-10 NOTE — CHART NOTE - NSCHARTNOTEFT_GEN_A_CORE
Discussed IVC filter placement with Dr. Cheng. She would like to await EGD and colonoscopy findings prior to placement.

## 2019-10-10 NOTE — PROGRESS NOTE ADULT - ASSESSMENT
1. anemia / acute GI bleed   with maroon stools .  will transfuse 2 units , started IV protonix .  EGD /COLONOSCOPY rescheduled for tomorrow.  recheck hemoglobin in evening .  hold off on therapeutic AC for now.  also requested for IR to do IVC filter if patient continues to have bleeding after scope and if not able to tolerate AC .       2. Acute lower extremity DVT   probably provoked by immobility however not certain .  holding off on lovenox due to GI bleed .  IVC filter to be done by IR if patient continues to have intolerable to AC .  CTA pending for 5 days , although clinically no PE , ECHO normal .    3. Acute rhabdomyolysis   resolved .    4. Febrile illness   resolved .  work up negative so far .  no antibiotics as per ID .     5. pancytopenia   due to alcohol.  platelet has normalized , still anemic .  will monitor .    6. transaminitis   likely due to alcoholic liver disease   improving .  US negative for cirrhosis .    7. Hyponatremia   likely due to dehydration or beer potomania   resolved.     8. s/p alcohol withdrawal   resolved.   continue to monitor.   patient refused to see SBIRT .    9. DVT prophylaxis   ambulation , no chemical prophylaxis due to GI bleed, no mechanical prophylaxis due to acute DVT in left lower extremity .     10. Disposition   seen by PT , recs were to do home with assistance .  discussed with case management to see if patient would be agreeable for PAULA .     details discussed with patient , he is in agreement with plan . he remains at high risk of complications and deterioration .

## 2019-10-11 ENCOUNTER — RESULT REVIEW (OUTPATIENT)
Age: 69
End: 2019-10-11

## 2019-10-11 LAB
ANION GAP SERPL CALC-SCNC: 10 MMOL/L — SIGNIFICANT CHANGE UP (ref 5–17)
BUN SERPL-MCNC: 9 MG/DL — SIGNIFICANT CHANGE UP (ref 8–20)
CALCIUM SERPL-MCNC: 8.9 MG/DL — SIGNIFICANT CHANGE UP (ref 8.6–10.2)
CHLORIDE SERPL-SCNC: 99 MMOL/L — SIGNIFICANT CHANGE UP (ref 98–107)
CO2 SERPL-SCNC: 25 MMOL/L — SIGNIFICANT CHANGE UP (ref 22–29)
CREAT SERPL-MCNC: 0.58 MG/DL — SIGNIFICANT CHANGE UP (ref 0.5–1.3)
GLUCOSE SERPL-MCNC: 93 MG/DL — SIGNIFICANT CHANGE UP (ref 70–115)
HCT VFR BLD CALC: 23.4 % — LOW (ref 39–50)
HGB BLD-MCNC: 7.5 G/DL — LOW (ref 13–17)
MCHC RBC-ENTMCNC: 31.8 PG — SIGNIFICANT CHANGE UP (ref 27–34)
MCHC RBC-ENTMCNC: 32.1 GM/DL — SIGNIFICANT CHANGE UP (ref 32–36)
MCV RBC AUTO: 99.2 FL — SIGNIFICANT CHANGE UP (ref 80–100)
PLATELET # BLD AUTO: 230 K/UL — SIGNIFICANT CHANGE UP (ref 150–400)
POTASSIUM SERPL-MCNC: 4 MMOL/L — SIGNIFICANT CHANGE UP (ref 3.5–5.3)
POTASSIUM SERPL-SCNC: 4 MMOL/L — SIGNIFICANT CHANGE UP (ref 3.5–5.3)
RBC # BLD: 2.36 M/UL — LOW (ref 4.2–5.8)
RBC # FLD: 20.4 % — HIGH (ref 10.3–14.5)
SODIUM SERPL-SCNC: 134 MMOL/L — LOW (ref 135–145)
WBC # BLD: 4.34 K/UL — SIGNIFICANT CHANGE UP (ref 3.8–10.5)
WBC # FLD AUTO: 4.34 K/UL — SIGNIFICANT CHANGE UP (ref 3.8–10.5)

## 2019-10-11 PROCEDURE — 88342 IMHCHEM/IMCYTCHM 1ST ANTB: CPT | Mod: 26

## 2019-10-11 PROCEDURE — 45378 DIAGNOSTIC COLONOSCOPY: CPT

## 2019-10-11 PROCEDURE — 99232 SBSQ HOSP IP/OBS MODERATE 35: CPT

## 2019-10-11 PROCEDURE — 43239 EGD BIOPSY SINGLE/MULTIPLE: CPT | Mod: 59

## 2019-10-11 PROCEDURE — 74177 CT ABD & PELVIS W/CONTRAST: CPT | Mod: 26

## 2019-10-11 PROCEDURE — 88305 TISSUE EXAM BY PATHOLOGIST: CPT | Mod: 26

## 2019-10-11 RX ORDER — METOPROLOL TARTRATE 50 MG
25 TABLET ORAL ONCE
Refills: 0 | Status: COMPLETED | OUTPATIENT
Start: 2019-10-11 | End: 2019-10-12

## 2019-10-11 RX ADMIN — PANTOPRAZOLE SODIUM 40 MILLIGRAM(S): 20 TABLET, DELAYED RELEASE ORAL at 05:21

## 2019-10-11 RX ADMIN — PANTOPRAZOLE SODIUM 40 MILLIGRAM(S): 20 TABLET, DELAYED RELEASE ORAL at 16:16

## 2019-10-11 NOTE — PROGRESS NOTE ADULT - ASSESSMENT
1. anemia / acute GI bleed   s/p transfusion 2 units yesterday  . hb at 7.5 .  EGD /COLONOSCOPY today.  hold off on therapeutic AC for now.  also requested for IR to do IVC filter if patient continues to have bleeding after scope and if not able to tolerate AC . '  no active bleeding       2. Acute lower extremity DVT   probably provoked by immobility however not certain .  holding off on lovenox due to GI bleed .  IVC filter to be done by IR if patient continues to have intolerance to AC .  CTA pending for 6 days , although clinically no PE , ECHO normal .    3. Acute rhabdomyolysis   resolved .    4. Febrile illness   resolved .  work up negative so far .  no antibiotics as per ID .     5. pancytopenia   due to alcohol.  WBC and platelet normal /    6. transaminitis   likely due to alcoholic liver disease   improving .  US negative for cirrhosis .    7. Hyponatremia   likely due to dehydration or beer potomania   resolved.     8. s/p alcohol withdrawal   resolved.   continue to monitor.   patient refused to see SBIRT .    9. DVT prophylaxis   ambulation , no chemical prophylaxis due to GI bleed, no mechanical prophylaxis due to acute DVT in left lower extremity .     10. Disposition   seen by PT , recs were to do home with assistance .  discussed with case management to see if patient would be agreeable for PAULA .     details discussed with patient , he is in agreement with plan . he remains at high risk of complications and deterioration .

## 2019-10-11 NOTE — BRIEF OPERATIVE NOTE - OPERATION/FINDINGS
Colonoscopy:  Universal diverticulosis; small internal hemorrhoids;   endoscopy:  Small hiatus hernia;  Duodenal mass / tumor.  Submucosal with overlying inflamed mucosal  occupying entire lumen.  No obstruction.  Slight friability.  Biopsied.  Soft.

## 2019-10-11 NOTE — CHART NOTE - NSCHARTNOTEFT_GEN_A_CORE
followed up on EGD findings , showed duodenal mass , discussed with GI concern if this is lipoma .  recs were to do CT , orders are in place , also advance diet to regular .  hold off on AC today since patient just had multiple biopsies , will resume tomorrow.

## 2019-10-12 LAB
ANION GAP SERPL CALC-SCNC: 10 MMOL/L — SIGNIFICANT CHANGE UP (ref 5–17)
BUN SERPL-MCNC: 5 MG/DL — LOW (ref 8–20)
CALCIUM SERPL-MCNC: 8.5 MG/DL — LOW (ref 8.6–10.2)
CHLORIDE SERPL-SCNC: 100 MMOL/L — SIGNIFICANT CHANGE UP (ref 98–107)
CO2 SERPL-SCNC: 24 MMOL/L — SIGNIFICANT CHANGE UP (ref 22–29)
CREAT SERPL-MCNC: 0.6 MG/DL — SIGNIFICANT CHANGE UP (ref 0.5–1.3)
GLUCOSE SERPL-MCNC: 89 MG/DL — SIGNIFICANT CHANGE UP (ref 70–115)
HCT VFR BLD CALC: 21.8 % — LOW (ref 39–50)
HGB BLD-MCNC: 7.2 G/DL — LOW (ref 13–17)
MCHC RBC-ENTMCNC: 33 GM/DL — SIGNIFICANT CHANGE UP (ref 32–36)
MCHC RBC-ENTMCNC: 33 PG — SIGNIFICANT CHANGE UP (ref 27–34)
MCV RBC AUTO: 100 FL — SIGNIFICANT CHANGE UP (ref 80–100)
PLATELET # BLD AUTO: 268 K/UL — SIGNIFICANT CHANGE UP (ref 150–400)
POTASSIUM SERPL-MCNC: 3.9 MMOL/L — SIGNIFICANT CHANGE UP (ref 3.5–5.3)
POTASSIUM SERPL-SCNC: 3.9 MMOL/L — SIGNIFICANT CHANGE UP (ref 3.5–5.3)
RBC # BLD: 2.18 M/UL — LOW (ref 4.2–5.8)
RBC # FLD: 19.4 % — HIGH (ref 10.3–14.5)
SODIUM SERPL-SCNC: 134 MMOL/L — LOW (ref 135–145)
WBC # BLD: 6.37 K/UL — SIGNIFICANT CHANGE UP (ref 3.8–10.5)
WBC # FLD AUTO: 6.37 K/UL — SIGNIFICANT CHANGE UP (ref 3.8–10.5)

## 2019-10-12 PROCEDURE — 99233 SBSQ HOSP IP/OBS HIGH 50: CPT

## 2019-10-12 RX ORDER — ENOXAPARIN SODIUM 100 MG/ML
70 INJECTION SUBCUTANEOUS EVERY 12 HOURS
Refills: 0 | Status: COMPLETED | OUTPATIENT
Start: 2019-10-12 | End: 2019-10-13

## 2019-10-12 RX ORDER — METOPROLOL TARTRATE 50 MG
25 TABLET ORAL
Refills: 0 | Status: DISCONTINUED | OUTPATIENT
Start: 2019-10-12 | End: 2019-10-14

## 2019-10-12 RX ORDER — LISINOPRIL 2.5 MG/1
10 TABLET ORAL DAILY
Refills: 0 | Status: DISCONTINUED | OUTPATIENT
Start: 2019-10-12 | End: 2019-10-17

## 2019-10-12 RX ADMIN — Medication 40 MILLIEQUIVALENT(S): at 11:13

## 2019-10-12 RX ADMIN — Medication 1 TABLET(S): at 11:13

## 2019-10-12 RX ADMIN — Medication 1 MILLIGRAM(S): at 11:13

## 2019-10-12 RX ADMIN — ENOXAPARIN SODIUM 70 MILLIGRAM(S): 100 INJECTION SUBCUTANEOUS at 17:15

## 2019-10-12 RX ADMIN — LISINOPRIL 10 MILLIGRAM(S): 2.5 TABLET ORAL at 17:14

## 2019-10-12 RX ADMIN — PANTOPRAZOLE SODIUM 40 MILLIGRAM(S): 20 TABLET, DELAYED RELEASE ORAL at 05:09

## 2019-10-12 RX ADMIN — Medication 250 MILLIGRAM(S): at 05:08

## 2019-10-12 RX ADMIN — Medication 25 MILLIGRAM(S): at 00:09

## 2019-10-12 RX ADMIN — Medication 25 MILLIGRAM(S): at 17:13

## 2019-10-12 RX ADMIN — PANTOPRAZOLE SODIUM 40 MILLIGRAM(S): 20 TABLET, DELAYED RELEASE ORAL at 16:47

## 2019-10-12 NOTE — CHART NOTE - NSCHARTNOTEFT_GEN_A_CORE
Source: Patient [x ]  Family [ ]   other [ ]    Current Diet: Diet, Regular (10-11-19 @ 16:44)    PO intake:  Pt reports good po intake at meals, no complaints at this time.    Current Weight:   (10/10) 158#  (10/4) 161#    % Weight Change possible 3# wt loss since admission, will continue to monitor    Pertinent Medications: MEDICATIONS  (STANDING):  folic acid 1 milliGRAM(s) Oral daily  multivitamin 1 Tablet(s) Oral daily  pantoprazole  Injectable 40 milliGRAM(s) IV Push two times a day  potassium chloride    Tablet ER 40 milliEquivalent(s) Oral daily  saccharomyces boulardii 250 milliGRAM(s) Oral two times a day    MEDICATIONS  (PRN):    Pertinent Labs: CBC Full  -  ( 12 Oct 2019 08:40 )  WBC Count : 6.37 K/uL  RBC Count : 2.18 M/uL  Hemoglobin : 7.2 g/dL  Hematocrit : 21.8 %  Platelet Count - Automated : 268 K/uL  Mean Cell Volume : 100.0 fl  Mean Cell Hemoglobin : 33.0 pg  Mean Cell Hemoglobin Concentration : 33.0 gm/dL  10-12 Na134 mmol/L<L> Glu 89 mg/dL K+ 3.9 mmol/L Cr  0.60 mg/dL BUN 5.0 mg/dL<L> Phos n/a   Alb n/a   PAB n/a       Skin: no skin breakdown noted    Nutrition focused physical exam conducted - found signs of malnutrition [ ]absent [x ]present    Subcutaneous fat loss: [x ] Orbital fat pads region, [ ]Buccal fat region, [ ]Triceps region,  [ ]Ribs region    Muscle wasting: [x ]Temples region, [x ]Clavicle region, [x ]Shoulder region, [ ]Scapula region, [ ]Interosseous region,  [ ]thigh region, [ ]Calf region    Estimated Needs:   [x ] no change since previous assessment  [ ] recalculated:     Current Nutrition Diagnosis: Pt remains at nutrition risk secondary to moderate protein calorie malnutrition (social/environmental) related to inability to consume sufficient protein energy intake with consumption of 2 meals daily in setting of ETOH abuse as evidenced by pt meeting <75% estimated energy intake > 3 months and mild muscle wasting/fat loss.  Pt also with possible wt loss prior to admission and during admission as well.  Pt continues with good po intake at meals.    Recommendations:   RX: Ensure BID  Continue with MVI and Folic Acid daily  Monitor daily wts    Monitoring and Evaluation:   [x ] PO intake [ x] Tolerance to diet prescription [X] Weights  [X] Follow up per protocol [X] Labs:

## 2019-10-12 NOTE — PROGRESS NOTE ADULT - ASSESSMENT
69 yo male admitted for GI bleed , DVT , anemia of blood loss , s/p EGD with duodenal mass , haital hernia       1- Anemia of blood loss acute on cronic iron deficiency   will tx 1 unit to keep hb > 8    2- Duodenal mass   s/p EGD biopsy   GI follow up     3- RLE DVT   not candidate  for anticoagulation   for IVC filter by IR likely  on Monday 67 yo male admitted for GI bleed , DVT , anemia of blood loss , s/p EGD with duodenal mass , haital hernia       1- Anemia of blood loss acute on cronic iron deficiency   will tx 1 unit to keep hb > 8    2- Duodenal mass   s/p EGD biopsy   GI follow up     3- LLE DVT   may not be candiadte for anticoagulation due to anemia , risk of bleed   will monitor

## 2019-10-12 NOTE — PROGRESS NOTE ADULT - ASSESSMENT
Anemia unexplained.  Possible D2 lesion.  Little to be gained by performance of CTE.    Needs Capsule endoscopy, eventually;   and EUS (Possible for tuesday).      Probably needs another transfusion.

## 2019-10-13 LAB
HCT VFR BLD CALC: 25.1 % — LOW (ref 39–50)
HGB BLD-MCNC: 8.2 G/DL — LOW (ref 13–17)
MCHC RBC-ENTMCNC: 32.7 GM/DL — SIGNIFICANT CHANGE UP (ref 32–36)
MCHC RBC-ENTMCNC: 32.7 PG — SIGNIFICANT CHANGE UP (ref 27–34)
MCV RBC AUTO: 100 FL — SIGNIFICANT CHANGE UP (ref 80–100)
PLATELET # BLD AUTO: 309 K/UL — SIGNIFICANT CHANGE UP (ref 150–400)
RBC # BLD: 2.51 M/UL — LOW (ref 4.2–5.8)
RBC # FLD: 20.3 % — HIGH (ref 10.3–14.5)
WBC # BLD: 4.49 K/UL — SIGNIFICANT CHANGE UP (ref 3.8–10.5)
WBC # FLD AUTO: 4.49 K/UL — SIGNIFICANT CHANGE UP (ref 3.8–10.5)

## 2019-10-13 PROCEDURE — 99233 SBSQ HOSP IP/OBS HIGH 50: CPT

## 2019-10-13 PROCEDURE — 99232 SBSQ HOSP IP/OBS MODERATE 35: CPT

## 2019-10-13 RX ADMIN — Medication 1 TABLET(S): at 12:44

## 2019-10-13 RX ADMIN — ENOXAPARIN SODIUM 70 MILLIGRAM(S): 100 INJECTION SUBCUTANEOUS at 05:59

## 2019-10-13 RX ADMIN — Medication 25 MILLIGRAM(S): at 05:58

## 2019-10-13 RX ADMIN — Medication 250 MILLIGRAM(S): at 18:09

## 2019-10-13 RX ADMIN — PANTOPRAZOLE SODIUM 40 MILLIGRAM(S): 20 TABLET, DELAYED RELEASE ORAL at 18:10

## 2019-10-13 RX ADMIN — LISINOPRIL 10 MILLIGRAM(S): 2.5 TABLET ORAL at 05:58

## 2019-10-13 RX ADMIN — Medication 40 MILLIEQUIVALENT(S): at 12:45

## 2019-10-13 RX ADMIN — Medication 250 MILLIGRAM(S): at 05:59

## 2019-10-13 RX ADMIN — Medication 1 MILLIGRAM(S): at 12:44

## 2019-10-13 RX ADMIN — Medication 25 MILLIGRAM(S): at 18:10

## 2019-10-13 RX ADMIN — ENOXAPARIN SODIUM 70 MILLIGRAM(S): 100 INJECTION SUBCUTANEOUS at 18:10

## 2019-10-13 RX ADMIN — PANTOPRAZOLE SODIUM 40 MILLIGRAM(S): 20 TABLET, DELAYED RELEASE ORAL at 07:45

## 2019-10-13 NOTE — PROGRESS NOTE ADULT - ASSESSMENT
Awaiting results of endoscopic biopsies of D2 lesion.  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    Plan for EUS and bx of duodenal mass on tuesday with Dr SALAZAR Ceja.    Procedure/ risks/ benefits/ prep explained to pt who understands and agrees to proceed.

## 2019-10-13 NOTE — PROGRESS NOTE ADULT - ASSESSMENT
69 yo male admitted for GI bleed , DVT , anemia of blood loss , s/p EGD with duodenal mass , haital hernia       1- Anemia of blood loss acute on cronic iron deficiency   had i unit PRBC yesterday   trend down   repeat hb in am   if drops will consider stop anticoagulation       2- Duodenal mass   s/p EGD biopsy   GI follow up appreciated     3- LLE DVT   may not be good idea to discharge on anticoagulation . monitor hb   if cont to drop will stop AC then consider IVC filter     4- HTN - uncontrolled yesterday   metoprolol and lisinopril ordered   better today

## 2019-10-14 LAB
APPEARANCE UR: CLEAR — SIGNIFICANT CHANGE UP
BILIRUB UR-MCNC: NEGATIVE — SIGNIFICANT CHANGE UP
COLOR SPEC: YELLOW — SIGNIFICANT CHANGE UP
DIFF PNL FLD: NEGATIVE — SIGNIFICANT CHANGE UP
GLUCOSE UR QL: NEGATIVE MG/DL — SIGNIFICANT CHANGE UP
HCT VFR BLD CALC: 26.3 % — LOW (ref 39–50)
HGB BLD-MCNC: 8.7 G/DL — LOW (ref 13–17)
KETONES UR-MCNC: NEGATIVE — SIGNIFICANT CHANGE UP
LEUKOCYTE ESTERASE UR-ACNC: NEGATIVE — SIGNIFICANT CHANGE UP
MCHC RBC-ENTMCNC: 32.5 PG — SIGNIFICANT CHANGE UP (ref 27–34)
MCHC RBC-ENTMCNC: 33.1 GM/DL — SIGNIFICANT CHANGE UP (ref 32–36)
MCV RBC AUTO: 98.1 FL — SIGNIFICANT CHANGE UP (ref 80–100)
NITRITE UR-MCNC: NEGATIVE — SIGNIFICANT CHANGE UP
PH UR: 6 — SIGNIFICANT CHANGE UP (ref 5–8)
PLATELET # BLD AUTO: 348 K/UL — SIGNIFICANT CHANGE UP (ref 150–400)
PROT UR-MCNC: NEGATIVE MG/DL — SIGNIFICANT CHANGE UP
RBC # BLD: 2.68 M/UL — LOW (ref 4.2–5.8)
RBC # FLD: 18.9 % — HIGH (ref 10.3–14.5)
SP GR SPEC: 1 — LOW (ref 1.01–1.02)
UROBILINOGEN FLD QL: NEGATIVE MG/DL — SIGNIFICANT CHANGE UP
WBC # BLD: 4.44 K/UL — SIGNIFICANT CHANGE UP (ref 3.8–10.5)
WBC # FLD AUTO: 4.44 K/UL — SIGNIFICANT CHANGE UP (ref 3.8–10.5)

## 2019-10-14 PROCEDURE — 99232 SBSQ HOSP IP/OBS MODERATE 35: CPT

## 2019-10-14 PROCEDURE — 99233 SBSQ HOSP IP/OBS HIGH 50: CPT

## 2019-10-14 RX ORDER — TAMSULOSIN HYDROCHLORIDE 0.4 MG/1
0.4 CAPSULE ORAL AT BEDTIME
Refills: 0 | Status: DISCONTINUED | OUTPATIENT
Start: 2019-10-14 | End: 2019-10-17

## 2019-10-14 RX ORDER — METOPROLOL TARTRATE 50 MG
50 TABLET ORAL
Refills: 0 | Status: DISCONTINUED | OUTPATIENT
Start: 2019-10-14 | End: 2019-10-17

## 2019-10-14 RX ORDER — ENOXAPARIN SODIUM 100 MG/ML
70 INJECTION SUBCUTANEOUS ONCE
Refills: 0 | Status: COMPLETED | OUTPATIENT
Start: 2019-10-14 | End: 2019-10-14

## 2019-10-14 RX ORDER — PANTOPRAZOLE SODIUM 20 MG/1
40 TABLET, DELAYED RELEASE ORAL
Refills: 0 | Status: DISCONTINUED | OUTPATIENT
Start: 2019-10-14 | End: 2019-10-17

## 2019-10-14 RX ADMIN — LISINOPRIL 10 MILLIGRAM(S): 2.5 TABLET ORAL at 05:50

## 2019-10-14 RX ADMIN — PANTOPRAZOLE SODIUM 40 MILLIGRAM(S): 20 TABLET, DELAYED RELEASE ORAL at 05:50

## 2019-10-14 RX ADMIN — Medication 1 MILLIGRAM(S): at 13:07

## 2019-10-14 RX ADMIN — ENOXAPARIN SODIUM 70 MILLIGRAM(S): 100 INJECTION SUBCUTANEOUS at 13:07

## 2019-10-14 RX ADMIN — Medication 1 TABLET(S): at 13:07

## 2019-10-14 RX ADMIN — PANTOPRAZOLE SODIUM 40 MILLIGRAM(S): 20 TABLET, DELAYED RELEASE ORAL at 18:35

## 2019-10-14 RX ADMIN — Medication 50 MILLIGRAM(S): at 18:35

## 2019-10-14 RX ADMIN — TAMSULOSIN HYDROCHLORIDE 0.4 MILLIGRAM(S): 0.4 CAPSULE ORAL at 21:36

## 2019-10-14 RX ADMIN — Medication 25 MILLIGRAM(S): at 05:50

## 2019-10-14 RX ADMIN — Medication 250 MILLIGRAM(S): at 05:50

## 2019-10-14 RX ADMIN — Medication 40 MILLIEQUIVALENT(S): at 13:07

## 2019-10-14 NOTE — PROGRESS NOTE ADULT - ASSESSMENT
69 yo male admitted for GI bleed , DVT , anemia of blood loss , s/p EGD with duodenal mass , haital hernia       1- Anemia of blood loss acute on cronic iron deficiency   s/p blood tx , now hb stable   monitor on lovenox for DVT     2- Duodenal mass   s/p EGD biopsy   per GI for  EGD with EUS tomorrow  hold lovenox after today's dose     3- LLE DVT   if cont to drop hb on lovenox   may need IVC filter d/w pt he understand     4- kidney lesion on the left   will get Mr of kidney   enlarged prostate , PSA ordered   bladder scan r/o retention   urology consult called to evaluate     5- HTN - uncontrolled   increase dose of metoprolol   cont lisinopril

## 2019-10-14 NOTE — PROGRESS NOTE ADULT - ASSESSMENT
Patient with submucosal duodenal lesion. Anemia. Stable. No additional complaints.  Keep NPO after midnight for EGD with EUS tomorrow

## 2019-10-15 DIAGNOSIS — N28.89 OTHER SPECIFIED DISORDERS OF KIDNEY AND URETER: ICD-10-CM

## 2019-10-15 DIAGNOSIS — N40.0 BENIGN PROSTATIC HYPERPLASIA WITHOUT LOWER URINARY TRACT SYMPTOMS: ICD-10-CM

## 2019-10-15 PROBLEM — Z00.00 ENCOUNTER FOR PREVENTIVE HEALTH EXAMINATION: Status: ACTIVE | Noted: 2019-10-15

## 2019-10-15 LAB
APTT BLD: 25.8 SEC — LOW (ref 27.5–36.3)
HCT VFR BLD CALC: 25.7 % — LOW (ref 39–50)
HGB BLD-MCNC: 8.4 G/DL — LOW (ref 13–17)
INR BLD: 1.05 RATIO — SIGNIFICANT CHANGE UP (ref 0.88–1.16)
MCHC RBC-ENTMCNC: 32.4 PG — SIGNIFICANT CHANGE UP (ref 27–34)
MCHC RBC-ENTMCNC: 32.7 GM/DL — SIGNIFICANT CHANGE UP (ref 32–36)
MCV RBC AUTO: 99.2 FL — SIGNIFICANT CHANGE UP (ref 80–100)
PLATELET # BLD AUTO: 342 K/UL — SIGNIFICANT CHANGE UP (ref 150–400)
PROTHROM AB SERPL-ACNC: 12.1 SEC — SIGNIFICANT CHANGE UP (ref 10–12.9)
PSA FLD-MCNC: 0.85 NG/ML — SIGNIFICANT CHANGE UP (ref 0–4)
RBC # BLD: 2.59 M/UL — LOW (ref 4.2–5.8)
RBC # FLD: 18 % — HIGH (ref 10.3–14.5)
SURGICAL PATHOLOGY STUDY: SIGNIFICANT CHANGE UP
WBC # BLD: 4.15 K/UL — SIGNIFICANT CHANGE UP (ref 3.8–10.5)
WBC # FLD AUTO: 4.15 K/UL — SIGNIFICANT CHANGE UP (ref 3.8–10.5)

## 2019-10-15 PROCEDURE — 43259 EGD US EXAM DUODENUM/JEJUNUM: CPT

## 2019-10-15 PROCEDURE — 99232 SBSQ HOSP IP/OBS MODERATE 35: CPT

## 2019-10-15 RX ORDER — IRON SUCROSE 20 MG/ML
200 INJECTION, SOLUTION INTRAVENOUS ONCE
Refills: 0 | Status: COMPLETED | OUTPATIENT
Start: 2019-10-15 | End: 2019-10-15

## 2019-10-15 RX ORDER — ENOXAPARIN SODIUM 100 MG/ML
70 INJECTION SUBCUTANEOUS EVERY 12 HOURS
Refills: 0 | Status: DISCONTINUED | OUTPATIENT
Start: 2019-10-15 | End: 2019-10-16

## 2019-10-15 RX ORDER — AMLODIPINE BESYLATE 2.5 MG/1
5 TABLET ORAL DAILY
Refills: 0 | Status: DISCONTINUED | OUTPATIENT
Start: 2019-10-15 | End: 2019-10-15

## 2019-10-15 RX ORDER — IRON SUCROSE 20 MG/ML
100 INJECTION, SOLUTION INTRAVENOUS ONCE
Refills: 0 | Status: COMPLETED | OUTPATIENT
Start: 2019-10-15 | End: 2019-10-15

## 2019-10-15 RX ORDER — AMLODIPINE BESYLATE 2.5 MG/1
5 TABLET ORAL
Refills: 0 | Status: DISCONTINUED | OUTPATIENT
Start: 2019-10-15 | End: 2019-10-17

## 2019-10-15 RX ADMIN — Medication 1 TABLET(S): at 15:26

## 2019-10-15 RX ADMIN — Medication 50 MILLIGRAM(S): at 17:52

## 2019-10-15 RX ADMIN — PANTOPRAZOLE SODIUM 40 MILLIGRAM(S): 20 TABLET, DELAYED RELEASE ORAL at 05:40

## 2019-10-15 RX ADMIN — IRON SUCROSE 110 MILLIGRAM(S): 20 INJECTION, SOLUTION INTRAVENOUS at 15:26

## 2019-10-15 RX ADMIN — TAMSULOSIN HYDROCHLORIDE 0.4 MILLIGRAM(S): 0.4 CAPSULE ORAL at 21:51

## 2019-10-15 RX ADMIN — Medication 1 MILLIGRAM(S): at 15:25

## 2019-10-15 RX ADMIN — Medication 40 MILLIEQUIVALENT(S): at 15:26

## 2019-10-15 RX ADMIN — ENOXAPARIN SODIUM 70 MILLIGRAM(S): 100 INJECTION SUBCUTANEOUS at 21:51

## 2019-10-15 RX ADMIN — Medication 50 MILLIGRAM(S): at 05:39

## 2019-10-15 RX ADMIN — AMLODIPINE BESYLATE 5 MILLIGRAM(S): 2.5 TABLET ORAL at 15:25

## 2019-10-15 RX ADMIN — AMLODIPINE BESYLATE 5 MILLIGRAM(S): 2.5 TABLET ORAL at 21:51

## 2019-10-15 RX ADMIN — LISINOPRIL 10 MILLIGRAM(S): 2.5 TABLET ORAL at 05:39

## 2019-10-15 RX ADMIN — PANTOPRAZOLE SODIUM 40 MILLIGRAM(S): 20 TABLET, DELAYED RELEASE ORAL at 17:52

## 2019-10-15 NOTE — BRIEF OPERATIVE NOTE - NSICDXBRIEFPREOP_GEN_ALL_CORE_FT
PRE-OP DIAGNOSIS:  Duodenal mass 15-Oct-2019 14:00:58  Librado Ceja
PRE-OP DIAGNOSIS:  Occult blood in stools 11-Oct-2019 14:17:01  Hai Belle  Iron deficiency anemia due to chronic blood loss 11-Oct-2019 14:16:53  Hai Belle

## 2019-10-15 NOTE — BRIEF OPERATIVE NOTE - NSICDXBRIEFPROCEDURE_GEN_ALL_CORE_FT
PROCEDURES:  Ultrasound, GI tract, endoscopic 15-Oct-2019 14:00:36  Librado Ceja
PROCEDURES:  Endoscopy, UGI, with biopsy and dilation if indicated 11-Oct-2019 14:15:03  Hai Belle  Flexible colonoscopy 11-Oct-2019 14:13:48  Hai Belle

## 2019-10-15 NOTE — BRIEF OPERATIVE NOTE - NSICDXBRIEFPOSTOP_GEN_ALL_CORE_FT
POST-OP DIAGNOSIS:  Enteric duplication cyst 15-Oct-2019 14:01:33  Librado Ceja
POST-OP DIAGNOSIS:  Duodenal mass 11-Oct-2019 14:18:15  Hai Belle  Esophageal hiatus hernia 11-Oct-2019 14:17:56  Hai Belle  Hemorrhoids, internal 11-Oct-2019 14:17:39  Hai Belle  Colon, diverticulosis 11-Oct-2019 14:17:26  Hai Belle

## 2019-10-15 NOTE — BRIEF OPERATIVE NOTE - OPERATION/FINDINGS
EGD: Duodenal submucosal lesion. Duodenal duplication cyst. 3rd layer anechoic lesion suggestive of duplication cyst

## 2019-10-15 NOTE — CONSULT NOTE ADULT - PROBLEM SELECTOR RECOMMENDATION 2
Continue flomax, check bladder scans. PSA pending. Will need outpt cystoscopy for bladder wall thickening

## 2019-10-15 NOTE — PROVIDER CONTACT NOTE (MEDICATION) - SITUATION
called to verify order; at 1500 venofer 200mg administered; as per md pak do not administer 100mg / order will be discontinue//

## 2019-10-15 NOTE — PROGRESS NOTE ADULT - ASSESSMENT
67 yo male admitted for GI bleed , DVT , anemia of blood loss , s/p EGD with duodenal mass , haital hernia   CT of abd pelvis showed left kidney lesion , bladder wall thickening , BPH urology consulted       1- Anemia of blood loss acute on cronic iron deficiency   s/p blood tx , now hb stable   monitor on lovenox     2- Duodenal mass   s/p EGD biopsy   result of   EGD with EUS reviewed     3- LLE DVT   resume lovenox for DVT   monitor hb if drops or bleed will get IVC filter     4- kidney lesion on the left   MR of kidney ordered pending     5- BPH / bladder wall thickening   urology consult appreciated   cont flomax , outpatient follwo up for cystoscopy     6- HTN - uncontrolled   increase dose of metoprolol   cont lisinopril , add amlodipine   monitor     home soon

## 2019-10-16 LAB
ANION GAP SERPL CALC-SCNC: 11 MMOL/L — SIGNIFICANT CHANGE UP (ref 5–17)
BUN SERPL-MCNC: 9 MG/DL — SIGNIFICANT CHANGE UP (ref 8–20)
CALCIUM SERPL-MCNC: 8.7 MG/DL — SIGNIFICANT CHANGE UP (ref 8.6–10.2)
CHLORIDE SERPL-SCNC: 97 MMOL/L — LOW (ref 98–107)
CO2 SERPL-SCNC: 22 MMOL/L — SIGNIFICANT CHANGE UP (ref 22–29)
CREAT SERPL-MCNC: 0.61 MG/DL — SIGNIFICANT CHANGE UP (ref 0.5–1.3)
GLUCOSE SERPL-MCNC: 120 MG/DL — HIGH (ref 70–115)
HCT VFR BLD CALC: 27.2 % — LOW (ref 39–50)
HGB BLD-MCNC: 9 G/DL — LOW (ref 13–17)
MAGNESIUM SERPL-MCNC: 1.3 MG/DL — LOW (ref 1.6–2.6)
MCHC RBC-ENTMCNC: 33.1 GM/DL — SIGNIFICANT CHANGE UP (ref 32–36)
MCHC RBC-ENTMCNC: 33.1 PG — SIGNIFICANT CHANGE UP (ref 27–34)
MCV RBC AUTO: 100 FL — SIGNIFICANT CHANGE UP (ref 80–100)
PLATELET # BLD AUTO: 384 K/UL — SIGNIFICANT CHANGE UP (ref 150–400)
POTASSIUM SERPL-MCNC: 4.2 MMOL/L — SIGNIFICANT CHANGE UP (ref 3.5–5.3)
POTASSIUM SERPL-SCNC: 4.2 MMOL/L — SIGNIFICANT CHANGE UP (ref 3.5–5.3)
RBC # BLD: 2.72 M/UL — LOW (ref 4.2–5.8)
RBC # FLD: 17.3 % — HIGH (ref 10.3–14.5)
SODIUM SERPL-SCNC: 130 MMOL/L — LOW (ref 135–145)
WBC # BLD: 8.1 K/UL — SIGNIFICANT CHANGE UP (ref 3.8–10.5)
WBC # FLD AUTO: 8.1 K/UL — SIGNIFICANT CHANGE UP (ref 3.8–10.5)

## 2019-10-16 PROCEDURE — 74183 MRI ABD W/O CNTR FLWD CNTR: CPT | Mod: 26

## 2019-10-16 PROCEDURE — 99232 SBSQ HOSP IP/OBS MODERATE 35: CPT

## 2019-10-16 RX ORDER — MAGNESIUM SULFATE 500 MG/ML
2 VIAL (ML) INJECTION ONCE
Refills: 0 | Status: COMPLETED | OUTPATIENT
Start: 2019-10-16 | End: 2019-10-16

## 2019-10-16 RX ORDER — MAGNESIUM OXIDE 400 MG ORAL TABLET 241.3 MG
400 TABLET ORAL
Refills: 0 | Status: DISCONTINUED | OUTPATIENT
Start: 2019-10-16 | End: 2019-10-17

## 2019-10-16 RX ORDER — APIXABAN 2.5 MG/1
10 TABLET, FILM COATED ORAL EVERY 12 HOURS
Refills: 0 | Status: DISCONTINUED | OUTPATIENT
Start: 2019-10-16 | End: 2019-10-17

## 2019-10-16 RX ADMIN — ENOXAPARIN SODIUM 70 MILLIGRAM(S): 100 INJECTION SUBCUTANEOUS at 10:41

## 2019-10-16 RX ADMIN — AMLODIPINE BESYLATE 5 MILLIGRAM(S): 2.5 TABLET ORAL at 21:44

## 2019-10-16 RX ADMIN — Medication 1 TABLET(S): at 10:41

## 2019-10-16 RX ADMIN — PANTOPRAZOLE SODIUM 40 MILLIGRAM(S): 20 TABLET, DELAYED RELEASE ORAL at 16:40

## 2019-10-16 RX ADMIN — AMLODIPINE BESYLATE 5 MILLIGRAM(S): 2.5 TABLET ORAL at 10:41

## 2019-10-16 RX ADMIN — Medication 50 MILLIGRAM(S): at 05:25

## 2019-10-16 RX ADMIN — APIXABAN 10 MILLIGRAM(S): 2.5 TABLET, FILM COATED ORAL at 21:44

## 2019-10-16 RX ADMIN — MAGNESIUM OXIDE 400 MG ORAL TABLET 400 MILLIGRAM(S): 241.3 TABLET ORAL at 16:39

## 2019-10-16 RX ADMIN — Medication 50 GRAM(S): at 16:39

## 2019-10-16 RX ADMIN — LISINOPRIL 10 MILLIGRAM(S): 2.5 TABLET ORAL at 05:25

## 2019-10-16 RX ADMIN — TAMSULOSIN HYDROCHLORIDE 0.4 MILLIGRAM(S): 0.4 CAPSULE ORAL at 21:44

## 2019-10-16 RX ADMIN — PANTOPRAZOLE SODIUM 40 MILLIGRAM(S): 20 TABLET, DELAYED RELEASE ORAL at 05:27

## 2019-10-16 RX ADMIN — Medication 50 MILLIGRAM(S): at 16:39

## 2019-10-16 RX ADMIN — Medication 1 MILLIGRAM(S): at 10:41

## 2019-10-16 NOTE — PROGRESS NOTE ADULT - PROBLEM SELECTOR PLAN 1
- duplication cyst on EUS. NO further work up.   - may D/C home from GI standpoint
- states he had black stool, but rectal showed dark brown stool  - no cirrhosis on U/S- hx of ETOH  - will do EGD in am, possible colon on friday. will hold pm dose of lovenox today. NPO aftermidnight
Hgb dropped from 7.5 to 5. He had dark maroon stool. Has family history of colon cancer. He has a hx of etoh abuse.  Currently receiving transfusions  start PPI IV  EGD and colonoscopy tomorrow   prep tonight   NPO after midnight
MRI being  performed this morning  and pending

## 2019-10-16 NOTE — PROGRESS NOTE ADULT - REASON FOR ADMISSION
fall
DVT
DVT , rhabdo
fall
rhabdomyolysis
Rhabdo/ ARF.
Rhabdomyolysis.  Anemia; alcohol withdrawal.
shaila
mental status change, EOTH, anant

## 2019-10-16 NOTE — PROGRESS NOTE ADULT - ASSESSMENT
69 yo male admitted for GI bleed , DVT , anemia of blood loss , s/p EGD with duodenal mass , haital hernia   CT of abd pelvis showed left kidney lesion , bladder wall thickening , BPH urology consulted , MR of kidney showed kidney cyst , HB stable will cahnge to eliquis       1- Anemia of blood loss acute on cronic iron deficiency   s/p blood tx , now hb stable       2- Duodenal mass bx resulkt reviewed   no cancer seen , gastritis   with H pylori positive   will d/w  GI     3- LLE DVT   resume lovenox for DVT   HB stable on lovenox   will change to eliquis   discharge in am     4- Hypomagnesemia   replace iv mag and po ordered     5- kidney lesion on the left , hemorrhagic cyst on MR   follow up with  in the office     6- BPH / bladder wall thickening   urology consult appreciated   cont flomax , outpatient follwo up for cystoscopy     7- HTN - uncontrolled   increase dose of metoprolol   cont lisinopril , add amlodipine   monitor     home in am

## 2019-10-17 ENCOUNTER — TRANSCRIPTION ENCOUNTER (OUTPATIENT)
Age: 69
End: 2019-10-17

## 2019-10-17 VITALS
OXYGEN SATURATION: 99 % | RESPIRATION RATE: 17 BRPM | TEMPERATURE: 98 F | HEART RATE: 70 BPM | DIASTOLIC BLOOD PRESSURE: 75 MMHG | SYSTOLIC BLOOD PRESSURE: 120 MMHG

## 2019-10-17 LAB
HCT VFR BLD CALC: 27.6 % — LOW (ref 39–50)
HGB BLD-MCNC: 8.9 G/DL — LOW (ref 13–17)
MCHC RBC-ENTMCNC: 32.2 GM/DL — SIGNIFICANT CHANGE UP (ref 32–36)
MCHC RBC-ENTMCNC: 32.6 PG — SIGNIFICANT CHANGE UP (ref 27–34)
MCV RBC AUTO: 101.1 FL — HIGH (ref 80–100)
PLATELET # BLD AUTO: 384 K/UL — SIGNIFICANT CHANGE UP (ref 150–400)
RBC # BLD: 2.73 M/UL — LOW (ref 4.2–5.8)
RBC # FLD: 17.4 % — HIGH (ref 10.3–14.5)
WBC # BLD: 4.55 K/UL — SIGNIFICANT CHANGE UP (ref 3.8–10.5)
WBC # FLD AUTO: 4.55 K/UL — SIGNIFICANT CHANGE UP (ref 3.8–10.5)

## 2019-10-17 PROCEDURE — 36415 COLL VENOUS BLD VENIPUNCTURE: CPT

## 2019-10-17 PROCEDURE — P9040: CPT

## 2019-10-17 PROCEDURE — 87486 CHLMYD PNEUM DNA AMP PROBE: CPT

## 2019-10-17 PROCEDURE — P9016: CPT

## 2019-10-17 PROCEDURE — 82553 CREATINE MB FRACTION: CPT

## 2019-10-17 PROCEDURE — 93306 TTE W/DOPPLER COMPLETE: CPT

## 2019-10-17 PROCEDURE — 87205 SMEAR GRAM STAIN: CPT

## 2019-10-17 PROCEDURE — 84157 ASSAY OF PROTEIN OTHER: CPT

## 2019-10-17 PROCEDURE — 62270 DX LMBR SPI PNXR: CPT

## 2019-10-17 PROCEDURE — 89051 BODY FLUID CELL COUNT: CPT

## 2019-10-17 PROCEDURE — 83880 ASSAY OF NATRIURETIC PEPTIDE: CPT

## 2019-10-17 PROCEDURE — 87086 URINE CULTURE/COLONY COUNT: CPT

## 2019-10-17 PROCEDURE — 84466 ASSAY OF TRANSFERRIN: CPT

## 2019-10-17 PROCEDURE — 83550 IRON BINDING TEST: CPT

## 2019-10-17 PROCEDURE — 83735 ASSAY OF MAGNESIUM: CPT

## 2019-10-17 PROCEDURE — 80202 ASSAY OF VANCOMYCIN: CPT

## 2019-10-17 PROCEDURE — 82140 ASSAY OF AMMONIA: CPT

## 2019-10-17 PROCEDURE — 86592 SYPHILIS TEST NON-TREP QUAL: CPT

## 2019-10-17 PROCEDURE — 82962 GLUCOSE BLOOD TEST: CPT

## 2019-10-17 PROCEDURE — 85018 HEMOGLOBIN: CPT

## 2019-10-17 PROCEDURE — 86850 RBC ANTIBODY SCREEN: CPT

## 2019-10-17 PROCEDURE — 80076 HEPATIC FUNCTION PANEL: CPT

## 2019-10-17 PROCEDURE — 82607 VITAMIN B-12: CPT

## 2019-10-17 PROCEDURE — G0103: CPT

## 2019-10-17 PROCEDURE — 82945 GLUCOSE OTHER FLUID: CPT

## 2019-10-17 PROCEDURE — 84145 PROCALCITONIN (PCT): CPT

## 2019-10-17 PROCEDURE — 93971 EXTREMITY STUDY: CPT

## 2019-10-17 PROCEDURE — 80307 DRUG TEST PRSMV CHEM ANLYZR: CPT

## 2019-10-17 PROCEDURE — 87070 CULTURE OTHR SPECIMN AEROBIC: CPT

## 2019-10-17 PROCEDURE — 86901 BLOOD TYPING SEROLOGIC RH(D): CPT

## 2019-10-17 PROCEDURE — 87483 CNS DNA AMP PROBE TYPE 12-25: CPT

## 2019-10-17 PROCEDURE — 88305 TISSUE EXAM BY PATHOLOGIST: CPT

## 2019-10-17 PROCEDURE — 85610 PROTHROMBIN TIME: CPT

## 2019-10-17 PROCEDURE — 85014 HEMATOCRIT: CPT

## 2019-10-17 PROCEDURE — 83615 LACTATE (LD) (LDH) ENZYME: CPT

## 2019-10-17 PROCEDURE — 81001 URINALYSIS AUTO W/SCOPE: CPT

## 2019-10-17 PROCEDURE — 76705 ECHO EXAM OF ABDOMEN: CPT

## 2019-10-17 PROCEDURE — 87581 M.PNEUMON DNA AMP PROBE: CPT

## 2019-10-17 PROCEDURE — 86803 HEPATITIS C AB TEST: CPT

## 2019-10-17 PROCEDURE — 85027 COMPLETE CBC AUTOMATED: CPT

## 2019-10-17 PROCEDURE — 80048 BASIC METABOLIC PNL TOTAL CA: CPT

## 2019-10-17 PROCEDURE — 74183 MRI ABD W/O CNTR FLWD CNTR: CPT

## 2019-10-17 PROCEDURE — 76700 US EXAM ABDOM COMPLETE: CPT

## 2019-10-17 PROCEDURE — 74177 CT ABD & PELVIS W/CONTRAST: CPT

## 2019-10-17 PROCEDURE — 86923 COMPATIBILITY TEST ELECTRIC: CPT

## 2019-10-17 PROCEDURE — 87633 RESP VIRUS 12-25 TARGETS: CPT

## 2019-10-17 PROCEDURE — 87476 LYME DIS DNA AMP PROBE: CPT

## 2019-10-17 PROCEDURE — 36000 PLACE NEEDLE IN VEIN: CPT | Mod: XU

## 2019-10-17 PROCEDURE — 87798 DETECT AGENT NOS DNA AMP: CPT

## 2019-10-17 PROCEDURE — 85730 THROMBOPLASTIN TIME PARTIAL: CPT

## 2019-10-17 PROCEDURE — 84443 ASSAY THYROID STIM HORMONE: CPT

## 2019-10-17 PROCEDURE — 96374 THER/PROPH/DIAG INJ IV PUSH: CPT | Mod: XU

## 2019-10-17 PROCEDURE — 88342 IMHCHEM/IMCYTCHM 1ST ANTB: CPT

## 2019-10-17 PROCEDURE — 97530 THERAPEUTIC ACTIVITIES: CPT

## 2019-10-17 PROCEDURE — 84100 ASSAY OF PHOSPHORUS: CPT

## 2019-10-17 PROCEDURE — 87040 BLOOD CULTURE FOR BACTERIA: CPT

## 2019-10-17 PROCEDURE — 83605 ASSAY OF LACTIC ACID: CPT

## 2019-10-17 PROCEDURE — 82746 ASSAY OF FOLIC ACID SERUM: CPT

## 2019-10-17 PROCEDURE — 99285 EMERGENCY DEPT VISIT HI MDM: CPT | Mod: 25

## 2019-10-17 PROCEDURE — 84484 ASSAY OF TROPONIN QUANT: CPT

## 2019-10-17 PROCEDURE — 82550 ASSAY OF CK (CPK): CPT

## 2019-10-17 PROCEDURE — 86900 BLOOD TYPING SEROLOGIC ABO: CPT

## 2019-10-17 PROCEDURE — 82728 ASSAY OF FERRITIN: CPT

## 2019-10-17 PROCEDURE — 97163 PT EVAL HIGH COMPLEX 45 MIN: CPT

## 2019-10-17 PROCEDURE — 70450 CT HEAD/BRAIN W/O DYE: CPT

## 2019-10-17 PROCEDURE — 83540 ASSAY OF IRON: CPT

## 2019-10-17 PROCEDURE — 36430 TRANSFUSION BLD/BLD COMPNT: CPT

## 2019-10-17 PROCEDURE — 97116 GAIT TRAINING THERAPY: CPT

## 2019-10-17 PROCEDURE — 80053 COMPREHEN METABOLIC PANEL: CPT

## 2019-10-17 PROCEDURE — 99239 HOSP IP/OBS DSCHRG MGMT >30: CPT

## 2019-10-17 PROCEDURE — 71045 X-RAY EXAM CHEST 1 VIEW: CPT

## 2019-10-17 PROCEDURE — 93005 ELECTROCARDIOGRAM TRACING: CPT

## 2019-10-17 PROCEDURE — 82272 OCCULT BLD FECES 1-3 TESTS: CPT

## 2019-10-17 RX ORDER — PANTOPRAZOLE SODIUM 20 MG/1
1 TABLET, DELAYED RELEASE ORAL
Qty: 30 | Refills: 0
Start: 2019-10-17 | End: 2019-11-15

## 2019-10-17 RX ORDER — APIXABAN 2.5 MG/1
1 TABLET, FILM COATED ORAL
Qty: 60 | Refills: 0
Start: 2019-10-17 | End: 2019-11-15

## 2019-10-17 RX ORDER — TAMSULOSIN HYDROCHLORIDE 0.4 MG/1
1 CAPSULE ORAL
Qty: 30 | Refills: 0
Start: 2019-10-17 | End: 2019-11-15

## 2019-10-17 RX ORDER — FERROUS SULFATE 325(65) MG
1 TABLET ORAL
Qty: 30 | Refills: 0
Start: 2019-10-17 | End: 2019-11-15

## 2019-10-17 RX ORDER — AMLODIPINE BESYLATE 2.5 MG/1
1 TABLET ORAL
Qty: 30 | Refills: 0
Start: 2019-10-17

## 2019-10-17 RX ORDER — FOLIC ACID 0.8 MG
1 TABLET ORAL
Qty: 30 | Refills: 0
Start: 2019-10-17 | End: 2019-11-15

## 2019-10-17 RX ORDER — LISINOPRIL 2.5 MG/1
1 TABLET ORAL
Qty: 0 | Refills: 0 | DISCHARGE
Start: 2019-10-17

## 2019-10-17 RX ORDER — METOPROLOL TARTRATE 50 MG
1 TABLET ORAL
Qty: 30 | Refills: 0
Start: 2019-10-17 | End: 2019-11-15

## 2019-10-17 RX ADMIN — Medication 50 MILLIGRAM(S): at 05:49

## 2019-10-17 RX ADMIN — Medication 1 TABLET(S): at 11:29

## 2019-10-17 RX ADMIN — PANTOPRAZOLE SODIUM 40 MILLIGRAM(S): 20 TABLET, DELAYED RELEASE ORAL at 05:49

## 2019-10-17 RX ADMIN — Medication 1 MILLIGRAM(S): at 11:29

## 2019-10-17 RX ADMIN — APIXABAN 10 MILLIGRAM(S): 2.5 TABLET, FILM COATED ORAL at 08:21

## 2019-10-17 RX ADMIN — MAGNESIUM OXIDE 400 MG ORAL TABLET 400 MILLIGRAM(S): 241.3 TABLET ORAL at 08:22

## 2019-10-17 RX ADMIN — LISINOPRIL 10 MILLIGRAM(S): 2.5 TABLET ORAL at 05:49

## 2019-10-17 RX ADMIN — AMLODIPINE BESYLATE 5 MILLIGRAM(S): 2.5 TABLET ORAL at 08:21

## 2019-10-17 NOTE — PROGRESS NOTE ADULT - ASSESSMENT
A/P:  renal cysts  BPH    Continue with flomax 0.4 mg  MRI demonstrates renal cysts  Follow up with DR. Jacobsen as outpatient.  Will see prn

## 2019-10-17 NOTE — DISCHARGE NOTE NURSING/CASE MANAGEMENT/SOCIAL WORK - PATIENT PORTAL LINK FT
You can access the FollowMyHealth Patient Portal offered by [facility] by registering at the following website: [email or web address]. By joining [employer]’s FollowMyHealth portal, you will also be able to view your health information using other applications (apps) compatible with our system.

## 2019-10-17 NOTE — PROGRESS NOTE ADULT - PROVIDER SPECIALTY LIST ADULT
Gastroenterology
Hospitalist
Infectious Disease
Infectious Disease
Urology
Urology
Hospitalist
Gastroenterology

## 2019-10-17 NOTE — DISCHARGE NOTE PROVIDER - PROVIDER TOKENS
PROVIDER:[TOKEN:[[id number]],FOLLOWUP:[2 weeks]],PROVIDER:[TOKEN:[[id number]],FOLLOWUP:[1 month]]

## 2019-10-17 NOTE — DISCHARGE NOTE PROVIDER - CARE PROVIDER_API CALL
Amari Jacobsen (DO)  Surgery  [address] [place] [address]  Phone: [phone]  Fax: [phone]  Follow Up Time: 2 weeks    Librado Ceja)  Gastroenterology; Internal Medicine  [address] [place] [address]  Phone: [phone]  Fax: [phone]  Follow Up Time: 1 month

## 2019-10-17 NOTE — PROGRESS NOTE ADULT - SUBJECTIVE AND OBJECTIVE BOX
CC:  anemia     INTERVAL HPI/OVERNIGHT EVENTS: seen and examined , had melanotic stools yesterday , nothing for last 24 hours . awaiting for EGD colonoscopy .  hemoglobin does not warrant transfusion . GI on board     REVIEW OF SYSTEMS:    CONSTITUTIONAL: No fever, weight loss, or fatigue  RESPIRATORY: No cough, wheezing, hemoptysis; No shortness of breath  CARDIOVASCULAR: No chest pain, palpitations  GASTROINTESTINAL: melanotic stools yesterday .   NEUROLOGICAL: No headaches, memory loss, loss of strength.      Vital Signs Last 24 Hrs  T(C): 36.4 (09 Oct 2019 10:22), Max: 36.8 (08 Oct 2019 16:18)  T(F): 97.6 (09 Oct 2019 10:22), Max: 98.3 (08 Oct 2019 16:18)  HR: 76 (09 Oct 2019 10:22) (72 - 85)  BP: 147/88 (09 Oct 2019 10:22) (136/88 - 147/88)  BP(mean): 108 (09 Oct 2019 10:22) (108 - 108)  RR: 17 (09 Oct 2019 10:22) (16 - 17)  SpO2: 96% (09 Oct 2019 10:22) (95% - 96%)    PHYSICAL EXAM:    GENERAL: NAD, resting comfortable in bed   HEENT: NC, AT    CHEST/LUNG: Clear to percussion bilaterally; No wheezing  HEART: S1S2+, Regular rate and rhythm; No murmurs, rubs, or gallops  ABDOMEN: Soft, Nontender, Nondistended; Bowel sounds present  EXTREMITIES:  no pitting edema , pulses palpated BL.    LABS:                        7.5    4.03  )-----------( 217      ( 09 Oct 2019 06:12 )             23.1     10-08    133<L>  |  96<L>  |  3.0<L>  ----------------------------<  89  4.0   |  26.0  |  0.60    Ca    8.7      08 Oct 2019 05:45    TPro  7.0  /  Alb  2.9<L>  /  TBili  0.8  /  DBili  0.3  /  AST  91<H>  /  ALT  48<H>  /  AlkPhos  104  10-08            MEDICATIONS  (STANDING):  enoxaparin Injectable 90 milliGRAM(s) SubCutaneous two times a day  folic acid 1 milliGRAM(s) Oral daily  lisinopril 5 milliGRAM(s) Oral daily  metoprolol succinate ER 50 milliGRAM(s) Oral two times a day  multivitamin 1 Tablet(s) Oral daily  pantoprazole  Injectable 40 milliGRAM(s) IV Push daily  potassium chloride    Tablet ER 40 milliEquivalent(s) Oral daily  saccharomyces boulardii 250 milliGRAM(s) Oral two times a day    MEDICATIONS  (PRN):  LORazepam     Tablet 1 milliGRAM(s) Oral every 2 hours PRN CIWA-Ar score increase by 2 points and a total score of 7 or less  LORazepam   Injectable 1 milliGRAM(s) IV Push every 1 hour PRN CIWA-Ar score 8 or greater      RADIOLOGY & ADDITIONAL TESTS:
CC: DVT     INTERVAL HPI/OVERNIGHT EVENTS: seen and examined , had worsening of lactic acidosis yesterday , resolved today . having issues with IV access , requested ICU team to insert central line or midline . seen by ID recs were to hold off on antibiotics . had tachycardia yesterday improved , ECHO and CTA chest pending . although it will not  .    REVIEW OF SYSTEMS:    CONSTITUTIONAL: No fever, weight loss, or fatigue, he also denies leg pain    RESPIRATORY: No cough, wheezing, hemoptysis; No shortness of breath  CARDIOVASCULAR: No chest pain, palpitations  GASTROINTESTINAL: No abdominal or epigastric pain. No nausea, vomiting  NEUROLOGICAL: No headaches, memory loss, loss of strength.      Vital Signs Last 24 Hrs  T(C): 36.8 (05 Oct 2019 15:57), Max: 37 (05 Oct 2019 11:30)  T(F): 98.3 (05 Oct 2019 15:57), Max: 98.6 (05 Oct 2019 11:30)  HR: 98 (05 Oct 2019 15:57) (82 - 98)  BP: 167/94 (05 Oct 2019 15:57) (155/82 - 167/94)  BP(mean): --  RR: 18 (05 Oct 2019 15:57) (18 - 20)  SpO2: 98% (05 Oct 2019 15:57) (98% - 100%)    PHYSICAL EXAM:    GENERAL: NAD, resting comfortable in bed   HEENT: NC, AT , PERRLA , no conjunctival pallor   CHEST/LUNG: Clear to percussion bilaterally; No wheezing  HEART: S1S2+, Regular rate and rhythm; No murmurs, rubs, or gallops  ABDOMEN: Soft, Nontender, Nondistended; Bowel sounds present  EXTREMITIES:  LLE is still swollen  , pulses palpated BL.      LABS:                        9.4    3.26  )-----------( 120      ( 05 Oct 2019 07:25 )             29.3     10-05    132<L>  |  97<L>  |  8.0  ----------------------------<  91  3.1<L>   |  24.0  |  0.59    Ca    8.1<L>      05 Oct 2019 07:22  Phos  2.6     10-04  Mg     1.7     10-04    TPro  7.3  /  Alb  2.8<L>  /  TBili  1.0  /  DBili  x   /  AST  173<H>  /  ALT  49<H>  /  AlkPhos  123<H>  10-04    PTT - ( 04 Oct 2019 15:51 )  PTT:130.9 sec  Urinalysis Basic - ( 04 Oct 2019 22:38 )    Color: Rajni / Appearance: Clear / S.010 / pH: x  Gluc: x / Ketone: Negative  / Bili: Negative / Urobili: Negative mg/dL   Blood: x / Protein: 30 mg/dL / Nitrite: Positive   Leuk Esterase: Trace / RBC: 6-10 /HPF / WBC 3-5   Sq Epi: x / Non Sq Epi: x / Bacteria: Few          MEDICATIONS  (STANDING):  enoxaparin Injectable 90 milliGRAM(s) SubCutaneous two times a day  folic acid 1 milliGRAM(s) Oral daily  influenza   Vaccine 0.5 milliLiter(s) IntraMuscular once  multivitamin 1 Tablet(s) Oral daily  potassium chloride    Tablet ER 40 milliEquivalent(s) Oral daily  saccharomyces boulardii 250 milliGRAM(s) Oral two times a day  sodium chloride 0.9% 1000 milliLiter(s) (100 mL/Hr) IV Continuous <Continuous>  sodium chloride 0.9%. 1000 milliLiter(s) (150 mL/Hr) IV Continuous <Continuous>  thiamine 100 milliGRAM(s) Oral daily    MEDICATIONS  (PRN):  acetaminophen   Tablet .. 650 milliGRAM(s) Oral every 6 hours PRN Temp greater or equal to 38C (100.4F)  LORazepam     Tablet 1 milliGRAM(s) Oral every 2 hours PRN CIWA-Ar score increase by 2 points and a total score of 7 or less  LORazepam   Injectable 1 milliGRAM(s) IV Push every 1 hour PRN CIWA-Ar score 8 or greater      RADIOLOGY & ADDITIONAL TESTS:
CC: alcohol withdrawal     INTERVAL HPI/OVERNIGHT EVENTS: seen and examined , heparin switched to lovenox . seen by ID antibiotics stopped.   had some wheezing over night , CXR done today is normal , BNP trending down , no oxygen requirements .     REVIEW OF SYSTEMS:    CONSTITUTIONAL: fever improved , left lower extremity pain   RESPIRATORY: No cough, wheezing, hemoptysis; No shortness of breath  CARDIOVASCULAR: No chest pain, palpitations  GASTROINTESTINAL: No abdominal or epigastric pain. No nausea, vomiting  NEUROLOGICAL: No headaches, , loss of strength.      Vital Signs Last 24 Hrs  T(C): 37.1 (04 Oct 2019 06:48), Max: 39.4 (04 Oct 2019 02:59)  T(F): 98.7 (04 Oct 2019 06:48), Max: 102.9 (04 Oct 2019 02:59)  HR: 101 (04 Oct 2019 06:48) (91 - 130)  BP: 139/87 (04 Oct 2019 04:12) (134/78 - 165/99)  BP(mean): --  RR: 18 (04 Oct 2019 04:12) (16 - 18)  SpO2: 96% (04 Oct 2019 04:12) (95% - 98%)    PHYSICAL EXAM:    GENERAL: NAD, resting comfortable in bed   HEENT: NC, AT    CHEST/LUNG: Clear to percussion bilaterally; No wheezing  HEART: S1S2+, Regular rate and rhythm; No murmurs, rubs, or gallops  ABDOMEN: Soft, Nontender, Nondistended; Bowel sounds present  EXTREMITIES:  LLE swollen as compared to right  , pulses palpated BL.      LABS:                        8.5    4.44  )-----------( 105      ( 04 Oct 2019 06:15 )             25.6     10-04    130<L>  |  97<L>  |  13.0  ----------------------------<  103  3.1<L>   |  22.0  |  0.93    Ca    7.4<L>      04 Oct 2019 06:15  Phos  2.6     10-04  Mg     1.7     10-04    TPro  6.5<L>  /  Alb  2.5<L>  /  TBili  1.5  /  DBili  x   /  AST  137<H>  /  ALT  38  /  AlkPhos  100  10-04    PT/INR - ( 03 Oct 2019 14:52 )   PT: 12.1 sec;   INR: 1.05 ratio         PTT - ( 04 Oct 2019 06:13 )  PTT:97.3 sec        MEDICATIONS  (STANDING):  enoxaparin Injectable 90 milliGRAM(s) SubCutaneous two times a day  folic acid 1 milliGRAM(s) Oral daily  multivitamin 1 Tablet(s) Oral daily  saccharomyces boulardii 250 milliGRAM(s) Oral two times a day  sodium chloride 0.9% 1000 milliLiter(s) (100 mL/Hr) IV Continuous <Continuous>  sodium chloride 0.9%. 1000 milliLiter(s) (75 mL/Hr) IV Continuous <Continuous>  thiamine 100 milliGRAM(s) Oral daily    MEDICATIONS  (PRN):  acetaminophen   Tablet .. 650 milliGRAM(s) Oral every 6 hours PRN Temp greater or equal to 38C (100.4F)  LORazepam     Tablet 1 milliGRAM(s) Oral every 2 hours PRN CIWA-Ar score increase by 2 points and a total score of 7 or less  LORazepam   Injectable 1 milliGRAM(s) IV Push every 1 hour PRN CIWA-Ar score 8 or greater      RADIOLOGY & ADDITIONAL TESTS:
CC: anemia     INTERVAL HPI/OVERNIGHT EVENTS: seen and examined , hemoglobin was found to be low at 5.6 , blood transfusion ordered . EGD /colonoscopy cancelled . also discussed about IVC filter placement . lovenox held for now.     REVIEW OF SYSTEMS:    CONSTITUTIONAL: No fever, weight loss, or fatigue  RESPIRATORY: No cough, wheezing, hemoptysis; No shortness of breath  CARDIOVASCULAR: No chest pain, palpitations  GASTROINTESTINAL: had maroon colored stools yesterday   NEUROLOGICAL: No headaches, memory loss, loss of strength.      Vital Signs Last 24 Hrs  T(C): 36.8 (10 Oct 2019 15:37), Max: 36.9 (09 Oct 2019 20:00)  T(F): 98.2 (10 Oct 2019 15:37), Max: 98.5 (09 Oct 2019 20:00)  HR: 79 (10 Oct 2019 15:37) (78 - 96)  BP: 149/84 (10 Oct 2019 15:37) (113/63 - 149/84)  BP(mean): --  RR: 20 (10 Oct 2019 15:37) (18 - 20)  SpO2: 99% (10 Oct 2019 15:37) (98% - 100%)    PHYSICAL EXAM:    GENERAL: NAD, resting comfortable in bed   CHEST/LUNG: Clear to percussion bilaterally; No wheezing  HEART: S1S2+, Regular rate and rhythm; No murmurs, rubs, or gallops  ABDOMEN: Soft, Nontender, Nondistended; Bowel sounds present  EXTREMITIES:  no pitting edema , pulses palpated BL.      LABS:                        5.6    3.54  )-----------( 208      ( 10 Oct 2019 06:19 )             17.2     10-10    136  |  101  |  18.0  ----------------------------<  96  4.1   |  26.0  |  0.65    Ca    8.7      10 Oct 2019 06:19              MEDICATIONS  (STANDING):  folic acid 1 milliGRAM(s) Oral daily  multivitamin 1 Tablet(s) Oral daily  pantoprazole  Injectable 40 milliGRAM(s) IV Push two times a day  potassium chloride    Tablet ER 40 milliEquivalent(s) Oral daily  saccharomyces boulardii 250 milliGRAM(s) Oral two times a day    MEDICATIONS  (PRN):  LORazepam     Tablet 1 milliGRAM(s) Oral every 2 hours PRN CIWA-Ar score increase by 2 points and a total score of 7 or less  LORazepam   Injectable 1 milliGRAM(s) IV Push every 1 hour PRN CIWA-Ar score 8 or greater      RADIOLOGY & ADDITIONAL TESTS:
CC: anemia     INTERVAL HPI/OVERNIGHT EVENTS: seen and examined . plan is to do EGD/colonoscopy today , no more melanotic stools reported .    REVIEW OF SYSTEMS:    CONSTITUTIONAL: No fever, weight loss, or fatigue  RESPIRATORY: No cough, wheezing, hemoptysis; No shortness of breath  CARDIOVASCULAR: No chest pain, palpitations  GASTROINTESTINAL: No abdominal or epigastric pain. No nausea, vomiting, no bloody stools reported   NEUROLOGICAL: No headaches, memory loss, loss of strength.      Vital Signs Last 24 Hrs  T(C): 36.2 (11 Oct 2019 08:32), Max: 36.8 (10 Oct 2019 15:37)  T(F): 97.1 (11 Oct 2019 08:32), Max: 98.2 (10 Oct 2019 15:37)  HR: 82 (11 Oct 2019 08:32) (78 - 87)  BP: 138/81 (11 Oct 2019 08:32) (138/81 - 165/77)  BP(mean): --  RR: 18 (11 Oct 2019 08:32) (18 - 20)  SpO2: 100% (11 Oct 2019 08:32) (99% - 100%)    PHYSICAL EXAM:    GENERAL: NAD, sitting up in chair   HEENT: NC, AT   CHEST/LUNG: Clear to percussion bilaterally; No wheezing  HEART: S1S2+, Regular rate and rhythm; No murmurs, rubs, or gallops  ABDOMEN: Soft, Nontender, Nondistended; Bowel sounds present  EXTREMITIES:  no pitting edema , pulses palpated BL.      LABS:                        7.5    4.34  )-----------( 230      ( 11 Oct 2019 08:26 )             23.4     10-11    134<L>  |  99  |  9.0  ----------------------------<  93  4.0   |  25.0  |  0.58    Ca    8.9      11 Oct 2019 08:26              MEDICATIONS  (STANDING):  folic acid 1 milliGRAM(s) Oral daily  multivitamin 1 Tablet(s) Oral daily  pantoprazole  Injectable 40 milliGRAM(s) IV Push two times a day  potassium chloride    Tablet ER 40 milliEquivalent(s) Oral daily  saccharomyces boulardii 250 milliGRAM(s) Oral two times a day    MEDICATIONS  (PRN):      RADIOLOGY & ADDITIONAL TESTS:
CC: rhabdomyolysis     INTERVAL HPI/OVERNIGHT EVENTS: seen and examined , still tachycardiac . ECHO normal .     REVIEW OF SYSTEMS:    CONSTITUTIONAL: No fever, weight loss, or fatigue , no leg pain   RESPIRATORY: No cough, wheezing, hemoptysis; No shortness of breath  CARDIOVASCULAR: No chest pain, palpitations  GASTROINTESTINAL: No abdominal or epigastric pain. No nausea, vomiting  NEUROLOGICAL: No headaches, memory loss, loss of strength.      Vital Signs Last 24 Hrs  T(C): 36.7 (06 Oct 2019 21:14), Max: 36.8 (06 Oct 2019 05:32)  T(F): 98.1 (06 Oct 2019 21:14), Max: 98.3 (06 Oct 2019 05:32)  HR: 90 (06 Oct 2019 21:14) (80 - 94)  BP: 174/95 (06 Oct 2019 21:14) (152/80 - 185/104)  BP(mean): --  RR: 20 (06 Oct 2019 21:14) (16 - 20)  SpO2: 100% (06 Oct 2019 21:14) (85% - 100%)    PHYSICAL EXAM:    GENERAL: NAD, resting comfortable in bed   CHEST/LUNG: Clear to percussion bilaterally; No wheezing  HEART: S1S2+, Regular rate and rhythm; No murmurs, rubs, or gallops  ABDOMEN: Soft, Nontender, Nondistended; Bowel sounds present  EXTREMITIES:  LLE swelling     LABS:                        8.1    x     )-----------( x        ( 06 Oct 2019 08:18 )             25.3     10-06    134<L>  |  99  |  4.0<L>  ----------------------------<  95  4.0   |  27.0  |  0.60    Ca    8.2<L>      06 Oct 2019 06:27    TPro  6.8  /  Alb  2.6<L>  /  TBili  1.4  /  DBili  0.9<H>  /  AST  162<H>  /  ALT  59<H>  /  AlkPhos  120  10-06            MEDICATIONS  (STANDING):  enoxaparin Injectable 90 milliGRAM(s) SubCutaneous two times a day  folic acid 1 milliGRAM(s) Oral daily  lisinopril 5 milliGRAM(s) Oral daily  metoprolol tartrate 25 milliGRAM(s) Oral two times a day  multivitamin 1 Tablet(s) Oral daily  saccharomyces boulardii 250 milliGRAM(s) Oral two times a day    MEDICATIONS  (PRN):  LORazepam     Tablet 1 milliGRAM(s) Oral every 2 hours PRN CIWA-Ar score increase by 2 points and a total score of 7 or less  LORazepam   Injectable 1 milliGRAM(s) IV Push every 1 hour PRN CIWA-Ar score 8 or greater      RADIOLOGY & ADDITIONAL TESTS:
Follow up for anemia , leg swelling   seen in am , feels better , left leg swollen +   denies SOB , no chest pain     GI follow up appreciated    Vital Signs Last 24 Hrs  T(C): 36.7 (08 Oct 2019 10:13), Max: 37.3 (07 Oct 2019 21:22)  T(F): 98 (08 Oct 2019 10:13), Max: 99.1 (07 Oct 2019 21:22)  HR: 71 (08 Oct 2019 10:13) (71 - 84)  BP: 166/92 (08 Oct 2019 10:13) (157/91 - 166/92)  BP(mean): --  RR: 18 (08 Oct 2019 10:13) (16 - 18)  SpO2: 97% (08 Oct 2019 10:13) (97% - 99%)PHYSICAL EXAM:    GENERAL: NAD, resting comfortable in bed , very pleasant   CHEST/LUNG: Clear to auscultation  bilaterally; No wheezing  HEART: S1S2+, Regular rate and rhythm; No murmurs  ABDOMEN: Soft, Nontender, Nondistended; Bowel sounds present  EXTREMITIES:  no pitting edema , no calf tenderness   Skin : pale color , no rash       LABS:                      8.9    3.30  )-----------( 180      ( 08 Oct 2019 05:45 )             27.5   10-08    133<L>  |  96<L>  |  3.0<L>  ----------------------------<  89  4.0   |  26.0  |  0.60    Ca    8.7      08 Oct 2019 05:45    TPro  7.0  /  Alb  2.9<L>  /  TBili  0.8  /  DBili  0.3  /  AST  91<H>  /  ALT  48<H>  /  AlkPhos  104  10-08
INFECTIOUS DISEASES AND INTERNAL MEDICINE at [place]  =======================================================  Jorge Angel MD  Diplomates [employer] Board of Internal Medicine and Infectious Diseases  Telephone [phone]  Fax            [phone]  =======================================================    [facility] LOOMIS [id number]    Follow up: altered mental status    Allergies:  No Known Allergies      Medications:  acetaminophen   Tablet .. 650 milliGRAM(s) Oral every 6 hours PRN  cefTRIAXone   IVPB 1000 milliGRAM(s) IV Intermittent every 24 hours  enoxaparin Injectable 90 milliGRAM(s) SubCutaneous two times a day  folic acid 1 milliGRAM(s) Oral daily  influenza   Vaccine 0.5 milliLiter(s) IntraMuscular once  LORazepam     Tablet 1 milliGRAM(s) Oral every 2 hours PRN  LORazepam   Injectable 1 milliGRAM(s) IV Push every 1 hour PRN  multivitamin 1 Tablet(s) Oral daily  potassium chloride    Tablet ER 40 milliEquivalent(s) Oral every 4 hours  potassium chloride    Tablet ER 40 milliEquivalent(s) Oral daily  saccharomyces boulardii 250 milliGRAM(s) Oral two times a day  sodium chloride 0.9% 1000 milliLiter(s) IV Continuous <Continuous>  sodium chloride 0.9%. 1000 milliLiter(s) IV Continuous <Continuous>  thiamine 100 milliGRAM(s) Oral daily    SOCIAL       FAMILY   FAMILY HISTORY:    REVIEW OF SYSTEMS:  CONSTITUTIONAL:  No Fever or chills  HEENT:   No diplopia or blurred vision.  No earache, sore throat or runny nose.  CARDIOVASCULAR:  No pressure, squeezing, strangling, tightness, heaviness or aching about the chest, neck, axilla or epigastrium.  RESPIRATORY:  No cough, shortness of breath, PND or orthopnea.  GASTROINTESTINAL:  No nausea, vomiting or diarrhea.  GENITOURINARY:  No dysuria, frequency or urgency. No Blood in urine  MUSCULOSKELETAL:   AS PER HPI  SKIN:  No change in skin, hair or nails.  NEUROLOGIC:  No paresthesias, fasciculations, seizures or weakness.  PSYCHIATRIC:  No disorder of thought or mood.  ENDOCRINE:  No heat or cold intolerance, polyuria or polydipsia.  HEMATOLOGICAL:  No easy bruising or bleeding.            Physical Exam:  ICU Vital Signs Last 24 Hrs  T(C): 36.7 (05 Oct 2019 06:43), Max: 37.5 (04 Oct 2019 17:20)  T(F): 98.1 (05 Oct 2019 06:43), Max: 99.5 (04 Oct 2019 17:20)  HR: 82 (05 Oct 2019 06:43) (82 - 120)  BP: 155/82 (05 Oct 2019 06:43) (127/84 - 156/84)  BP(mean): --  ABP: --  ABP(mean): --  RR: 20 (05 Oct 2019 06:43) (20 - 20)  SpO2: 100% (05 Oct 2019 06:43) (98% - 100%)    GEN: NAD,   HEENT: normocephalic and atraumatic. EOMI. HARRY.    NECK: Supple. No carotid bruits.  No lymphadenopathy or thyromegaly.  LUNGS: Clear to auscultation.  HEART: Regular rate and rhythm without murmur.  ABDOMEN: Soft, nontender, and nondistended.  Positive bowel sounds.    : No CVA tenderness  EXTREMITIES: Without any cyanosis, clubbing, rash, lesions or edema.  MSK: no joint swelling  NEUROLOGIC: Cranial nerves II through XII are grossly intact.  PSYCHIATRIC: Appropriate affect .  SKIN: No ulceration or induration present.        Labs:  10-05    132<L>  |  97<L>  |  8.0  ----------------------------<  91  3.1<L>   |  24.0  |  0.59    Ca    8.1<L>      05 Oct 2019 07:22  Phos  2.6     10-04  Mg     1.7     10-04    TPro  7.3  /  Alb  2.8<L>  /  TBili  1.0  /  DBili  x   /  AST  173<H>  /  ALT  49<H>  /  AlkPhos  123<H>  10-04                          9.4    3.26  )-----------( 120      ( 05 Oct 2019 07:25 )             29.3       PTT - ( 04 Oct 2019 15:51 )  PTT:130.9 sec  Urinalysis Basic - ( 04 Oct 2019 22:38 )    Color: Rajni / Appearance: Clear / S.010 / pH: x  Gluc: x / Ketone: Negative  / Bili: Negative / Urobili: Negative mg/dL   Blood: x / Protein: 30 mg/dL / Nitrite: Positive   Leuk Esterase: Trace / RBC: 6-10 /HPF / WBC 3-5   Sq Epi: x / Non Sq Epi: x / Bacteria: Few      LIVER FUNCTIONS - ( 04 Oct 2019 20:30 )  Alb: 2.8 g/dL / Pro: 7.3 g/dL / ALK PHOS: 123 U/L / ALT: 49 U/L / AST: 173 U/L / GGT: x           CARDIAC MARKERS ( 04 Oct 2019 12:05 )  x     / x     / 1431 U/L / x     / 2.8 ng/mL  CARDIAC MARKERS ( 04 Oct 2019 06:15 )  x     / x     / 1534 U/L / x     / 2.7 ng/mL      CAPILLARY BLOOD GLUCOSE      POCT Blood Glucose.: 106 mg/dL (04 Oct 2019 19:06)        RECENT CULTURES:  10-03 @ 18:16          NotDetec  10-03 @ 17:47 .CSF     No growth at 1 day.  Culture in progress    No White blood cells  No organisms seen
INFECTIOUS DISEASES AND INTERNAL MEDICINE at [place]  =======================================================  Jorge Angel MD  Diplomates [employer] Board of Internal Medicine and Infectious Diseases  Telephone [phone]  Fax            [phone]  =======================================================    [facility] LOOMIS [id number]    Follow up: altered mental status    Allergies:  No Known Allergies      Medications:  acetaminophen   Tablet .. 650 milliGRAM(s) Oral every 6 hours PRN  cefTRIAXone   IVPB 1000 milliGRAM(s) IV Intermittent every 24 hours  enoxaparin Injectable 90 milliGRAM(s) SubCutaneous two times a day  folic acid 1 milliGRAM(s) Oral daily  influenza   Vaccine 0.5 milliLiter(s) IntraMuscular once  LORazepam     Tablet 1 milliGRAM(s) Oral every 2 hours PRN  LORazepam   Injectable 1 milliGRAM(s) IV Push every 1 hour PRN  multivitamin 1 Tablet(s) Oral daily  potassium chloride    Tablet ER 40 milliEquivalent(s) Oral every 4 hours  potassium chloride    Tablet ER 40 milliEquivalent(s) Oral daily  saccharomyces boulardii 250 milliGRAM(s) Oral two times a day  sodium chloride 0.9% 1000 milliLiter(s) IV Continuous <Continuous>  sodium chloride 0.9%. 1000 milliLiter(s) IV Continuous <Continuous>  thiamine 100 milliGRAM(s) Oral daily    SOCIAL       FAMILY   FAMILY HISTORY:    REVIEW OF SYSTEMS:  CONSTITUTIONAL:  No Fever or chills  HEENT:   No diplopia or blurred vision.  No earache, sore throat or runny nose.  CARDIOVASCULAR:  No pressure, squeezing, strangling, tightness, heaviness or aching about the chest, neck, axilla or epigastrium.  RESPIRATORY:  No cough, shortness of breath, PND or orthopnea.  GASTROINTESTINAL:  No nausea, vomiting or diarrhea.  GENITOURINARY:  No dysuria, frequency or urgency. No Blood in urine  MUSCULOSKELETAL:   AS PER HPI  SKIN:  No change in skin, hair or nails.  NEUROLOGIC:  No paresthesias, fasciculations, seizures or weakness.  PSYCHIATRIC:  No disorder of thought or mood.  ENDOCRINE:  No heat or cold intolerance, polyuria or polydipsia.  HEMATOLOGICAL:  No easy bruising or bleeding.            Physical Exam:  ICU Vital Signs Last 24 Hrs  T(C): 36.7 (05 Oct 2019 06:43), Max: 37.5 (04 Oct 2019 17:20)  T(F): 98.1 (05 Oct 2019 06:43), Max: 99.5 (04 Oct 2019 17:20)  HR: 82 (05 Oct 2019 06:43) (82 - 120)  BP: 155/82 (05 Oct 2019 06:43) (127/84 - 156/84)  BP(mean): --  ABP: --  ABP(mean): --  RR: 20 (05 Oct 2019 06:43) (20 - 20)  SpO2: 100% (05 Oct 2019 06:43) (98% - 100%)    GEN: NAD,   HEENT: normocephalic and atraumatic. EOMI. HARRY.    NECK: Supple. No carotid bruits.  No lymphadenopathy or thyromegaly.  LUNGS: Clear to auscultation.  HEART: Regular rate and rhythm without murmur.  ABDOMEN: Soft, nontender, and nondistended.  Positive bowel sounds.    : No CVA tenderness  EXTREMITIES: Without any cyanosis, clubbing, rash, lesions or edema.  MSK: no joint swelling  NEUROLOGIC: Cranial nerves II through XII are grossly intact.  PSYCHIATRIC: Appropriate affect .  SKIN: No ulceration or induration present.        Labs:  10-05    132<L>  |  97<L>  |  8.0  ----------------------------<  91  3.1<L>   |  24.0  |  0.59    Ca    8.1<L>      05 Oct 2019 07:22  Phos  2.6     10-04  Mg     1.7     10-04    TPro  7.3  /  Alb  2.8<L>  /  TBili  1.0  /  DBili  x   /  AST  173<H>  /  ALT  49<H>  /  AlkPhos  123<H>  10-04                          9.4    3.26  )-----------( 120      ( 05 Oct 2019 07:25 )             29.3       PTT - ( 04 Oct 2019 15:51 )  PTT:130.9 sec  Urinalysis Basic - ( 04 Oct 2019 22:38 )    Color: Rajni / Appearance: Clear / S.010 / pH: x  Gluc: x / Ketone: Negative  / Bili: Negative / Urobili: Negative mg/dL   Blood: x / Protein: 30 mg/dL / Nitrite: Positive   Leuk Esterase: Trace / RBC: 6-10 /HPF / WBC 3-5   Sq Epi: x / Non Sq Epi: x / Bacteria: Few      LIVER FUNCTIONS - ( 04 Oct 2019 20:30 )  Alb: 2.8 g/dL / Pro: 7.3 g/dL / ALK PHOS: 123 U/L / ALT: 49 U/L / AST: 173 U/L / GGT: x           CARDIAC MARKERS ( 04 Oct 2019 12:05 )  x     / x     / 1431 U/L / x     / 2.8 ng/mL  CARDIAC MARKERS ( 04 Oct 2019 06:15 )  x     / x     / 1534 U/L / x     / 2.7 ng/mL      CAPILLARY BLOOD GLUCOSE      POCT Blood Glucose.: 106 mg/dL (04 Oct 2019 19:06)        RECENT CULTURES:  10-03 @ 18:16          NotDetec  10-03 @ 17:47 .CSF     No growth at 1 day.  Culture in progress    No White blood cells  No organisms seen
INTERVAL HPI/OVERNIGHT EVENTS:    MEDICATIONS  (STANDING):  amLODIPine   Tablet 5 milliGRAM(s) Oral <User Schedule>  enoxaparin Injectable 70 milliGRAM(s) SubCutaneous every 12 hours  folic acid 1 milliGRAM(s) Oral daily  lisinopril 10 milliGRAM(s) Oral daily  metoprolol tartrate 50 milliGRAM(s) Oral two times a day  multivitamin 1 Tablet(s) Oral daily  pantoprazole   Suspension 40 milliGRAM(s) Oral two times a day  tamsulosin 0.4 milliGRAM(s) Oral at bedtime    MEDICATIONS  (PRN):      Allergies    No Known Allergies    Intolerances        Vital Signs Last 24 Hrs  T(C): 36.5 (15 Oct 2019 23:49), Max: 36.5 (15 Oct 2019 23:49)  T(F): 97.7 (15 Oct 2019 23:49), Max: 97.7 (15 Oct 2019 23:49)  HR: 89 (15 Oct 2019 23:49) (67 - 89)  BP: 122/73 (15 Oct 2019 23:49) (122/73 - 174/92)  BP(mean): --  RR: 18 (15 Oct 2019 23:49) (18 - 18)  SpO2: 95% (15 Oct 2019 23:49) (95% - 98%)     ON PE:  General: alert and awake  Abdomen: No reported CVAT  : Bladder not distended    LABS:                        8.4    4.15  )-----------( 342      ( 15 Oct 2019 08:14 )             25.7           PT/INR - ( 15 Oct 2019 08:14 )   PT: 12.1 sec;   INR: 1.05 ratio         PTT - ( 15 Oct 2019 08:14 )  PTT:25.8 sec  Urinalysis Basic - ( 14 Oct 2019 19:59 )    Color: Yellow / Appearance: Clear / S.005 / pH: x  Gluc: x / Ketone: Negative  / Bili: Negative / Urobili: Negative mg/dL   Blood: x / Protein: Negative mg/dL / Nitrite: Negative   Leuk Esterase: Negative / RBC: x / WBC x   Sq Epi: x / Non Sq Epi: x / Bacteria: x        RADIOLOGY & ADDITIONAL TESTS:
INTERVAL HPI/OVERNIGHT EVENTS: Had dark maroon blood almost "like grape jelly" via rectum last night. He thinks it was a cup yesterday a small amount today.     ROS wnl     PAST MEDICAL & SURGICAL HISTORY:  Hypertension  H/O exploratory laparotomy      Home Medications:  furosemide 40 mg oral tablet:  orally  (08 Oct 2019 15:36)  lisinopril:  orally  (08 Oct 2019 15:36)      MEDICATIONS  (STANDING):  folic acid 1 milliGRAM(s) Oral daily  multivitamin 1 Tablet(s) Oral daily  pantoprazole  Injectable 40 milliGRAM(s) IV Push daily  potassium chloride    Tablet ER 40 milliEquivalent(s) Oral daily  saccharomyces boulardii 250 milliGRAM(s) Oral two times a day    MEDICATIONS  (PRN):  LORazepam     Tablet 1 milliGRAM(s) Oral every 2 hours PRN CIWA-Ar score increase by 2 points and a total score of 7 or less  LORazepam   Injectable 1 milliGRAM(s) IV Push every 1 hour PRN CIWA-Ar score 8 or greater      Allergies    No Known Allergies    Intolerances        PHYSICAL EXAM:   Vital Signs:  Vital Signs Last 24 Hrs  T(C): 36.9 (10 Oct 2019 09:44), Max: 36.9 (09 Oct 2019 20:00)  T(F): 98.5 (10 Oct 2019 09:44), Max: 98.5 (09 Oct 2019 20:00)  HR: 89 (10 Oct 2019 09:44) (78 - 96)  BP: 117/77 (10 Oct 2019 09:44) (113/63 - 151/77)  BP(mean): --  RR: 20 (10 Oct 2019 09:44) (18 - 20)  SpO2: 100% (10 Oct 2019 09:44) (98% - 100%)  Daily     Daily Weight in k.6 (10 Oct 2019 05:35)    GENERAL:  no distress  HEENT:  NC/AT,  anicteric  ABDOMEN:  Soft, non-tender, non-distended, normoactive bowel sounds  EXTREMITIES  no cyanosis      LABS:                        5.6    3.54  )-----------( 208      ( 10 Oct 2019 06:19 )             17.2       Hemoglobin: 5.6 g/dL (10-10-19 @ 06:19)  Hemoglobin: 7.5 g/dL (10-09-19 @ 06:12)  Hemoglobin: 8.9 g/dL (10-08-19 @ 05:45)      10-10    136  |  101  |  18.0  ----------------------------<  96  4.1   |  26.0  |  0.65    Ca    8.7      10 Oct 2019 06:19      Alkaline Phosphatase, Serum: 104 U/L (10-08-19 @ 05:45)  Aspartate Aminotransferase (AST/SGOT): 91 U/L (10-08-19 @ 05:45)  Bilirubin Direct, Serum: 0.3 mg/dL (10-08-19 @ 05:45)  Alanine Aminotransferase (ALT/SGPT): 48 U/L (10-08-19 @ 05:45)      RADIOLOGY & ADDITIONAL TESTS:  < from: US Abdomen Complete (10.07.19 @ 10:50) >  IMPRESSION:     Mobile sludge ball noted within the gallbladder which was not seen on   10/3/2019.    Otherwise unremarkable exam. No sonographic evidence of cirrhosis and no   evidence of ascites.    < end of copied text >
INTERVAL HPI/OVERNIGHT EVENTS: Patient reports voiding better.  He denies any new complaints    MEDICATIONS  (STANDING):  amLODIPine   Tablet 5 milliGRAM(s) Oral <User Schedule>  apixaban 10 milliGRAM(s) Oral every 12 hours  folic acid 1 milliGRAM(s) Oral daily  lisinopril 10 milliGRAM(s) Oral daily  magnesium oxide 400 milliGRAM(s) Oral three times a day with meals  metoprolol tartrate 50 milliGRAM(s) Oral two times a day  multivitamin 1 Tablet(s) Oral daily  pantoprazole   Suspension 40 milliGRAM(s) Oral two times a day  tamsulosin 0.4 milliGRAM(s) Oral at bedtime    MEDICATIONS  (PRN):      Allergies    No Known Allergies    Intolerances        Vital Signs Last 24 Hrs  T(C): 36.7 (16 Oct 2019 23:40), Max: 36.7 (16 Oct 2019 23:40)  T(F): 98 (16 Oct 2019 23:40), Max: 98 (16 Oct 2019 23:40)  HR: 86 (16 Oct 2019 23:40) (86 - 94)  BP: 120/74 (16 Oct 2019 23:40) (120/74 - 128/66)  BP(mean): --  RR: 18 (16 Oct 2019 23:40) (18 - 18)  SpO2: 95% (16 Oct 2019 23:40) (95% - 98%)    ROS:  as per HPI     ON PE:  General: Well developed; well nourished; in no acute distress  Head: Normocephalic; atraumatic  Respiratory: No tachypnea  Gastrointestinal: Soft non-tender non-distended;  Genitourinary: No costovertebral angle tenderness.  Urinary bladder is clinically not distended  Extremities: Normal range of motion, No edema  Neurological: Alert and oriented x4  Skin: Warm and dry. No acute rash  Psychiatric: Cooperative and appropriate      LABS:                        8.9    4.55  )-----------( 384      ( 17 Oct 2019 08:14 )             27.6     10-16    130<L>  |  97<L>  |  9.0  ----------------------------<  120<H>  4.2   |  22.0  |  0.61    Ca    8.7      16 Oct 2019 11:55  Mg     1.3     10-16            RADIOLOGY & ADDITIONAL TESTS:
INTERVAL HPI/OVERNIGHT EVENTS: s/p Us abdomen with normal liver. Hgb and plts improved today. Patient had melena last night. No abdominal pain. No new complaints.     ROS wnl     PAST MEDICAL & SURGICAL HISTORY:  Hypertension  H/O exploratory laparotomy      Home Medications:  furosemide 40 mg oral tablet:  orally  (2015 21:46)  lisinopril:  orally  (2015 21:46)      MEDICATIONS  (STANDING):  enoxaparin Injectable 90 milliGRAM(s) SubCutaneous two times a day  folic acid 1 milliGRAM(s) Oral daily  lisinopril 5 milliGRAM(s) Oral daily  metoprolol tartrate 25 milliGRAM(s) Oral two times a day  multivitamin 1 Tablet(s) Oral daily  pantoprazole  Injectable 40 milliGRAM(s) IV Push daily  potassium chloride    Tablet ER 40 milliEquivalent(s) Oral daily  saccharomyces boulardii 250 milliGRAM(s) Oral two times a day  sodium chloride 0.9%. 1000 milliLiter(s) (100 mL/Hr) IV Continuous <Continuous>    MEDICATIONS  (PRN):  LORazepam     Tablet 1 milliGRAM(s) Oral every 2 hours PRN CIWA-Ar score increase by 2 points and a total score of 7 or less  LORazepam   Injectable 1 milliGRAM(s) IV Push every 1 hour PRN CIWA-Ar score 8 or greater      Allergies    No Known Allergies    Intolerances          PHYSICAL EXAM:   Vital Signs:  Vital Signs Last 24 Hrs  T(C): 36.9 (08 Oct 2019 05:46), Max: 37.3 (07 Oct 2019 21:22)  T(F): 98.4 (08 Oct 2019 05:46), Max: 99.1 (07 Oct 2019 21:22)  HR: 78 (08 Oct 2019 05:46) (74 - 84)  BP: 160/78 (08 Oct 2019 05:46) (157/91 - 164/97)  BP(mean): --  RR: 16 (08 Oct 2019 05:46) (16 - 18)  SpO2: 98% (08 Oct 2019 05:46) (96% - 99%)  Daily     Daily Weight in k.1 (08 Oct 2019 04:50)    GENERAL:  no distress  HEENT:  NC/AT,  anicteric  ABDOMEN:  Soft, non-tender, non-distended, normoactive bowel sounds,  no masses   EXTREMITIES  no cyanosis      LABS:                        8.9    3.30  )-----------( 180      ( 08 Oct 2019 05:45 )             27.5       Hemoglobin: 8.9 g/dL (10-08-19 @ 05:45)  Hemoglobin: 8.1 g/dL (10-07-19 @ 05:59)  Hemoglobin: 8.1 g/dL (10-06-19 @ 08:18)  Hemoglobin: 7.9 g/dL (10-06-19 @ 06:27)      10-08    133<L>  |  96<L>  |  3.0<L>  ----------------------------<  89  4.0   |  26.0  |  0.60    Ca    8.7      08 Oct 2019 05:45    TPro  7.0  /  Alb  2.9<L>  /  TBili  0.8  /  DBili  0.3  /  AST  91<H>  /  ALT  48<H>  /  AlkPhos  104  10-08    Alkaline Phosphatase, Serum: 104 U/L (10-08-19 @ 05:45)  Aspartate Aminotransferase (AST/SGOT): 91 U/L (10-08-19 @ 05:45)  Bilirubin Direct, Serum: 0.3 mg/dL (10-08-19 @ 05:45)  Alanine Aminotransferase (ALT/SGPT): 48 U/L (10-08-19 @ 05:45)  Alanine Aminotransferase (ALT/SGPT): 59 U/L (10-06-19 @ 08:23)  Alkaline Phosphatase, Serum: 120 U/L (10-06-19 @ 08:23)  Aspartate Aminotransferase (AST/SGOT): 162 U/L (10-06-19 @ 08:23)  Bilirubin Direct, Serum: 0.9 mg/dL (10-06-19 @ 08:23)      RADIOLOGY & ADDITIONAL TESTS:  < from: US Abdomen Complete (10.07.19 @ 10:50) >  FINDINGS:    Liver: Within normal limits.    Bile ducts: Normal caliber. Common bile duct measures 5 mm.     Gallbladder: A mobile sludge ball is noted within the gallbladder lumen.   This is new since 10/3/2019.        Pancreas: Visualized portions are within normal limits.    Spleen: 9.3 cm. Within normal limits.    Right kidney: 11.9 cm. No hydronephrosis.    Left kidney: 11.3 cm.  No hydronephrosis. Small lower pole cyst.    Ascites: None.    Aorta and IVC: Visualized portions are within normal limits.    IMPRESSION:     Mobile sludge ball noted within the gallbladder which was not seen on   10/3/2019.    Otherwise unremarkable exam. No sonographic evidence of cirrhosis and no   evidence of ascites.    < end of copied text >
Internal Medicine Hospitalist Progress Note    Follow up for GI bleed , DVT   pt is seen earlier today   no new complaints , feels well   + bm brown stool     Gi follow up appreciated           Vital Signs Last 24 Hrs  T(C): 36.7 (13 Oct 2019 15:33), Max: 36.9 (13 Oct 2019 08:27)  T(F): 98.1 (13 Oct 2019 15:33), Max: 98.4 (13 Oct 2019 08:27)  HR: 70 (13 Oct 2019 15:33) (70 - 84)  BP: 154/90 (13 Oct 2019 15:33) (148/78 - 173/89)  BP(mean): --  RR: 18 (13 Oct 2019 15:33) (18 - 18)  SpO2: 98% (13 Oct 2019 15:33) (97% - 98%)    PHYSICAL EXAM:      Constitutional: awake alert , no distress    Respiratory: CTA  bilateral     Cardiovascular: regular s1/s2    Gastrointestinal: soft no tenderness , BS positive     Extremities: no pretibial edema     no calf tenderness       LABS:                        7.2    6.37  )-----------( 268      ( 12 Oct 2019 08:40 )             21.8     10-12    134<L>  |  100  |  5.0<L>  ----------------------------<  89  3.9   |  24.0  |  0.60    Ca    8.5<L>      12 Oct 2019 08:40          Radiology :
Internal Medicine Hospitalist Progress Note    Follow up for GI bleed , DVT   pt is seen in am , feels well , no complaints   GI follow up appreciated       ROS: as above, all remaining ROS are negative.       BACKGROUND:  MEDICATIONS  (STANDING):  folic acid 1 milliGRAM(s) Oral daily  multivitamin 1 Tablet(s) Oral daily  pantoprazole  Injectable 40 milliGRAM(s) IV Push two times a day  potassium chloride    Tablet ER 40 milliEquivalent(s) Oral daily  saccharomyces boulardii 250 milliGRAM(s) Oral two times a day    MEDICATIONS  (PRN):    Allergies    No Known Allergies    Intolerances            VITALS:  Vital Signs Last 24 Hrs  T(C): 36.3 (12 Oct 2019 08:53), Max: 37.3 (11 Oct 2019 23:57)  T(F): 97.3 (12 Oct 2019 08:53), Max: 99.1 (11 Oct 2019 23:57)  HR: 89 (12 Oct 2019 08:53) (61 - 120)  BP: 136/76 (12 Oct 2019 08:53) (136/76 - 165/87)  BP(mean): --  RR: 18 (12 Oct 2019 08:53) (18 - 18)  SpO2: 98% (12 Oct 2019 08:53) (98% - 99%) Daily     Daily   CAPILLARY BLOOD GLUCOSE        I&O's Summary      PHYSICAL EXAM:      Constitutional: awake alert     Neck: supple . no JVD     Respiratory: CTA  bilateral     Cardiovascular: regular s1/s2    Gastrointestinal: soft no tenderness , BS positive     Extremities: no pretibial edema           LABS:                        7.2    6.37  )-----------( 268      ( 12 Oct 2019 08:40 )             21.8     10-12    134<L>  |  100  |  5.0<L>  ----------------------------<  89  3.9   |  24.0  |  0.60    Ca    8.5<L>      12 Oct 2019 08:40          Radiology :
Patient is a 68y old  Male who presents with a chief complaint of Rhabdo/ ARF. (13 Oct 2019 15:00)      HPI:  NO melena. Tolerating diet. Hgb stable. 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 No abdominal pain    REVIEW OF SYSTEMS:  Constitutional: No fever, weight loss or fatigue  ENMT:  No difficulty hearing, tinnitus, vertigo; No sinus or throat pain  Respiratory: No cough, wheezing, chills or hemoptysis  Cardiovascular: No chest pain, palpitations, dizziness or leg swelling  Gastrointestinal: No abdominal or epigastric pain. No nausea, vomiting or hematemesis; No diarrhea or constipation. No melena or hematochezia.  Skin: No itching, burning, rashes or lesions   Musculoskeletal: No joint pain or swelling; No muscle, back or extremity pain    PAST MEDICAL & SURGICAL HISTORY:  Hypertension  H/O exploratory laparotomy      FAMILY HISTORY:      SOCIAL HISTORY:  Smoking Status: [ ] Current, [ ] Former, [ ] Never  Pack Years:  [ X ] EtOH  [  ] IVDA    MEDICATIONS:  MEDICATIONS  (STANDING):  amLODIPine   Tablet 5 milliGRAM(s) Oral <User Schedule>  apixaban 10 milliGRAM(s) Oral every 12 hours  folic acid 1 milliGRAM(s) Oral daily  lisinopril 10 milliGRAM(s) Oral daily  magnesium oxide 400 milliGRAM(s) Oral three times a day with meals  metoprolol tartrate 50 milliGRAM(s) Oral two times a day  multivitamin 1 Tablet(s) Oral daily  pantoprazole   Suspension 40 milliGRAM(s) Oral two times a day  tamsulosin 0.4 milliGRAM(s) Oral at bedtime    MEDICATIONS  (PRN):      Allergies    No Known Allergies    Intolerances        Vital Signs Last 24 Hrs  T(C): 36.6 (16 Oct 2019 15:24), Max: 36.6 (16 Oct 2019 15:24)  T(F): 97.8 (16 Oct 2019 15:24), Max: 97.8 (16 Oct 2019 15:24)  HR: 94 (16 Oct 2019 15:24) (89 - 94)  BP: 128/66 (16 Oct 2019 15:24) (122/73 - 128/66)  BP(mean): --  RR: 18 (16 Oct 2019 15:24) (18 - 18)  SpO2: 98% (16 Oct 2019 15:24) (95% - 98%)    10-15 @ 07:01  -  10-16 @ 07:00  --------------------------------------------------------  IN: 0 mL / OUT: 1700 mL / NET: -1700 mL          PHYSICAL EXAM:    General: Well developed; well nourished; in no acute distress  HEENT: MMM, conjunctiva and sclera clear  H- RRR  L- CTA  Gastrointestinal: Soft, non-tender non-distended; Normal bowel sounds; No rebound or guarding  Extremities: Normal range of motion, No clubbing, cyanosis or edema  Neurological: Alert and oriented x3  Skin: Warm and dry. No obvious rash      LABS:                        9.0    8.10  )-----------( 384      ( 16 Oct 2019 11:55 )             27.2     16 Oct 2019 11:55    130    |  97     |  9.0    ----------------------------<  120    4.2     |  22.0   |  0.61     Ca    8.7        16 Oct 2019 11:55  Mg     1.3       16 Oct 2019 11:55                RADIOLOGY & ADDITIONAL STUDIES:     < from: CT Abdomen and Pelvis w/ Oral Cont and w/ IV Cont (10.11.19 @ 21:18) >  IMPRESSION:   1. The bladder wall measures 11 mm. This is nonspecific and may represent   inflammation or infection. Neoplastic process and neurogenic bladder are   included in the differential.   2. 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 Unruptured aneurysm   right   common iliac artery 17 mm. Unruptured aneurysm left common iliac artery   15 mm.   3. The prostate is enlarged, greater than 5 cm. Recommend urology   consult.   4. Normal appendix.   5. Broad-based disc bulge, facet hypertrophy, and ligament hypertrophy at   L3/L4   and L4/L5 consistent with spinal stenosis. Recommend MRI for further   evaluation.   6. Broad-based disc bulge at L5/S1 may represent degenerative disc   disease.   7. 12 mm mass lower pole left kidney 69 Hounsfield units.. Recommend MR   without   and with contrast or CT without and with contrast. MR is preferred for   masses   under 1.5 cm.    < end of copied text >  < from: CT Abdomen and Pelvis w/ Oral Cont and w/ IV Cont (10.11.19 @ 21:18) >  IMPRESSION:   1. The bladder wall measures 11 mm. This is nonspecific and may represent   inflammation or infection. Neoplastic process and neurogenic bladder are   included in the differential.   2. 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 Unruptured aneurysm   right   common iliac artery 17 mm. Unruptured aneurysm left common iliac artery   15 mm.   3. The prostate is enlarged, greater than 5 cm. Recommend urology   consult.   4. Normal appendix.   5. Broad-based disc bulge, facet hypertrophy, and ligament hypertrophy at   L3/L4   and L4/L5 consistent with spinal stenosis. Recommend MRI for further   evaluation.   6. Broad-based disc bulge at L5/S1 may represent degenerative disc   disease.   7. 12 mm mass lower pole left kidney 69 Hounsfield units.. Recommend MR   without   and with contrast or CT without and with contrast. MR is preferred for   masses   under 1.5 cm.    < end of copied text >
Patient is a 68y old  Male who presents with a chief complaint of fall (08 Oct 2019 11:38)      HPI:  Patient is awake and altert. Oriented X 3. No abdominal pain. States he had black stool X 1 yesterday and today. I performed rectal exam which showed dark brown stool. Hgb slightly decreased from yesterday. No tremors. On lovenox for DVT    REVIEW OF SYSTEMS:  Constitutional: No fever, weight loss or fatigue  ENMT:  No difficulty hearing, tinnitus, vertigo; No sinus or throat pain  Respiratory: No cough, wheezing, chills or hemoptysis  Cardiovascular: No chest pain, palpitations, dizziness or leg swelling  Gastrointestinal: No abdominal or epigastric pain. No nausea, vomiting or hematemesis; No diarrhea or constipation. No melena or hematochezia.  Skin: No itching, burning, rashes or lesions   Musculoskeletal: No joint pain or swelling; No muscle, back or extremity pain    PAST MEDICAL & SURGICAL HISTORY:  Hypertension  H/O exploratory laparotomy      FAMILY HISTORY:      SOCIAL HISTORY:  Smoking Status: [ ] Current, [ ] Former, [ ] Never  Pack Years:  [ X ] EtOH  [  ] IVDA    MEDICATIONS:  MEDICATIONS  (STANDING):  enoxaparin Injectable 90 milliGRAM(s) SubCutaneous two times a day  folic acid 1 milliGRAM(s) Oral daily  iron sucrose IVPB 200 milliGRAM(s) IV Intermittent every 24 hours  lisinopril 5 milliGRAM(s) Oral daily  metoprolol succinate ER 50 milliGRAM(s) Oral two times a day  multivitamin 1 Tablet(s) Oral daily  pantoprazole  Injectable 40 milliGRAM(s) IV Push daily  potassium chloride    Tablet ER 40 milliEquivalent(s) Oral daily  saccharomyces boulardii 250 milliGRAM(s) Oral two times a day    MEDICATIONS  (PRN):  LORazepam     Tablet 1 milliGRAM(s) Oral every 2 hours PRN CIWA-Ar score increase by 2 points and a total score of 7 or less  LORazepam   Injectable 1 milliGRAM(s) IV Push every 1 hour PRN CIWA-Ar score 8 or greater      Allergies    No Known Allergies    Intolerances        Vital Signs Last 24 Hrs  T(C): 36.4 (09 Oct 2019 10:22), Max: 36.8 (08 Oct 2019 16:18)  T(F): 97.6 (09 Oct 2019 10:22), Max: 98.3 (08 Oct 2019 16:18)  HR: 76 (09 Oct 2019 10:22) (72 - 85)  BP: 147/88 (09 Oct 2019 10:22) (136/88 - 176/94)  BP(mean): 108 (09 Oct 2019 10:22) (108 - 108)  RR: 17 (09 Oct 2019 10:22) (16 - 17)  SpO2: 96% (09 Oct 2019 10:22) (95% - 97%)    10-08 @ 07:01  -  10-09 @ 07:00  --------------------------------------------------------  IN: 2210 mL / OUT: 0 mL / NET: 2210 mL          PHYSICAL EXAM:    General: Well developed; well nourished; in no acute distress  HEENT: MMM, conjunctiva and sclera clear  H- RRR  L- CTA  Gastrointestinal: Soft, non-tender non-distended; Normal bowel sounds; No rebound or guarding  Extremities: Normal range of motion, No clubbing, cyanosis or edema  Neurological: Alert and oriented x3  Skin: Warm and dry. No obvious rash  rectal- dark brown stool. No melena      LABS:                        7.5    4.03  )-----------( 217      ( 09 Oct 2019 06:12 )             23.1     08 Oct 2019 05:45    133    |  96     |  3.0    ----------------------------<  89     4.0     |  26.0   |  0.60     Ca    8.7        08 Oct 2019 05:45    TPro  7.0    /  Alb  2.9    /  TBili  0.8    /  DBili  0.3    /  AST  91     /  ALT  48     /  AlkPhos  104    / Amylase x      /Lipase x      08 Oct 2019 05:45              RADIOLOGY & ADDITIONAL STUDIES:     < from: CT Head No Cont (10.03.19 @ 15:39) >  Impression:  1. Unremarkable noncontrast CT scan of the brain.      < end of copied text >
Patient seen and examined; chart reviewed.  Feeling OK after procedures.  No overt bleeding.  Denies abdominal pain.  No fever.  Hgb again drifting downwards to 7.2.  No overt bleeding.  Colonoscopy:  Universal diverticulosis coli.  Small internal hemorrhoids.    EGD:  Large submucosal lesion in D2.  Bx of overlying mucosa done.    CT scan reviewed c Dr SALVADOR.  Duodenal lesion does not have the density of fat, making lipoma unlikely.  Small aneurysms noted.  No RP blood loss.      PAST MEDICAL & SURGICAL HISTORY:  Hypertension  H/O exploratory laparotomy      ROS:  No Heartburn, regurgitation, dysphagia, odynophagia.  No dyspepsia  No abdominal pain.    No Nausea, vomiting.  No Bleeding.  No hematemesis.   No diarrhea.    No hematochesia.  No weight loss, anorexia.  No edema.      MEDICATIONS  (STANDING):  folic acid 1 milliGRAM(s) Oral daily  multivitamin 1 Tablet(s) Oral daily  pantoprazole  Injectable 40 milliGRAM(s) IV Push two times a day  potassium chloride    Tablet ER 40 milliEquivalent(s) Oral daily  saccharomyces boulardii 250 milliGRAM(s) Oral two times a day    MEDICATIONS  (PRN):      Allergies    No Known Allergies    Intolerances        Vital Signs Last 24 Hrs  T(C): 36.3 (12 Oct 2019 08:53), Max: 37.3 (11 Oct 2019 23:57)  T(F): 97.3 (12 Oct 2019 08:53), Max: 99.1 (11 Oct 2019 23:57)  HR: 89 (12 Oct 2019 08:53) (61 - 120)  BP: 136/76 (12 Oct 2019 08:53) (136/76 - 165/87)  BP(mean): --  RR: 18 (12 Oct 2019 08:53) (18 - 18)  SpO2: 98% (12 Oct 2019 08:53) (98% - 99%)    PHYSICAL EXAM:    GENERAL: NAD, well-groomed, well-developed  HEAD:  Atraumatic, Normocephalic  EYES: EOMI, PERRLA, conjunctiva and sclera clear  ENMT: No tonsillar erythema, exudates, or enlargement; Moist mucous membranes, Good dentition, No lesions  NECK: Supple, No JVD, Normal thyroid  CHEST/LUNG: Clear to percussion bilaterally; No rales, rhonchi, wheezing, or rubs  HEART: Regular rate and rhythm; No murmurs, rubs, or gallops  ABDOMEN: Soft, Nontender, Nondistended; Bowel sounds present  EXTREMITIES:  2+ Peripheral Pulses, No clubbing, cyanosis, or edema  LYMPH: No lymphadenopathy noted  SKIN: No rashes or lesions      LABS:                        7.2    6.37  )-----------( 268      ( 12 Oct 2019 08:40 )             21.8     10-12    134<L>  |  100  |  5.0<L>  ----------------------------<  89  3.9   |  24.0  |  0.60    Ca    8.5<L>      12 Oct 2019 08:40               RADIOLOGY & ADDITIONAL STUDIES:
Patient seen and examined; chart reviewed.  Transfused another unit of blood yesterday, total of 3.  Tolerated well.  Hgb up to 8.2 from 7.2.  No overt bleeding.  No fever.  Eating well.  Denies abdominal pain.    Reviewed CT scan yesterday with Radiology.  D2 lesion is not a lipoma by CT criteria.  	    PAST MEDICAL & SURGICAL HISTORY:  Hypertension  H/O exploratory laparotomy      ROS:  No Heartburn, regurgitation, dysphagia, odynophagia.  No dyspepsia  No abdominal pain.    No Nausea, vomiting.  No Bleeding.  No hematemesis.   No diarrhea.    No hematochesia.  No weight loss, anorexia.  No edema.      MEDICATIONS  (STANDING):  enoxaparin Injectable 70 milliGRAM(s) SubCutaneous every 12 hours  folic acid 1 milliGRAM(s) Oral daily  lisinopril 10 milliGRAM(s) Oral daily  metoprolol tartrate 25 milliGRAM(s) Oral two times a day  multivitamin 1 Tablet(s) Oral daily  pantoprazole  Injectable 40 milliGRAM(s) IV Push two times a day  potassium chloride    Tablet ER 40 milliEquivalent(s) Oral daily  saccharomyces boulardii 250 milliGRAM(s) Oral two times a day    MEDICATIONS  (PRN):      Allergies    No Known Allergies    Intolerances    Vital Signs Last 24 Hrs  T(C): 36.9 (13 Oct 2019 08:27), Max: 36.9 (13 Oct 2019 08:27)  T(F): 98.4 (13 Oct 2019 08:27), Max: 98.4 (13 Oct 2019 08:27)  HR: 74 (13 Oct 2019 08:27) (74 - 84)  BP: 173/89 (13 Oct 2019 08:27) (148/78 - 174/89)  BP(mean): --  RR: 18 (13 Oct 2019 08:27) (18 - 18)  SpO2: 97% (13 Oct 2019 08:27) (97% - 98%)    PHYSICAL EXAM:    GENERAL: NAD, well-groomed, well-developed  HEAD:  Atraumatic, Normocephalic  EYES: EOMI, PERRLA, conjunctiva and sclera clear  ENMT: No tonsillar erythema, exudates, or enlargement; Moist mucous membranes, Good dentition, No lesions  NECK: Supple, No JVD, Normal thyroid  CHEST/LUNG: Clear to percussion bilaterally; No rales, rhonchi, wheezing, or rubs  HEART: Regular rate and rhythm; No murmurs, rubs, or gallops  ABDOMEN: Soft, Nontender, Nondistended; Bowel sounds present  EXTREMITIES:  2+ Peripheral Pulses, No clubbing, cyanosis, or edema  LYMPH: No lymphadenopathy noted  SKIN: No rashes or lesions      LABS:                        8.2    4.49  )-----------( 309      ( 13 Oct 2019 08:18 )             25.1     10-12    134<L>  |  100  |  5.0<L>  ----------------------------<  89  3.9   |  24.0  |  0.60    Ca    8.5<L>      12 Oct 2019 08:40    RADIOLOGY & ADDITIONAL STUDIES:  DS sold mass in submucosa.  Unclear etiology.  No adenopathy or liver mets.
CC: anemia     INTERVAL HPI/OVERNIGHT EVENTS: seen and examined , FOBT was positive requested for GI consult. still has ongoing electrolyte abnormalities .    REVIEW OF SYSTEMS:    CONSTITUTIONAL: No fever, weight loss, or fatigue  RESPIRATORY: No cough, wheezing, hemoptysis; No shortness of breath  CARDIOVASCULAR: No chest pain, palpitations  GASTROINTESTINAL: No abdominal or epigastric pain. No nausea, vomiting  NEUROLOGICAL: No headaches, memory loss, loss of strength.      Vital Signs Last 24 Hrs  T(C): 36.7 (07 Oct 2019 15:30), Max: 36.8 (07 Oct 2019 11:29)  T(F): 98.1 (07 Oct 2019 15:30), Max: 98.2 (07 Oct 2019 11:29)  HR: 74 (07 Oct 2019 15:30) (70 - 94)  BP: 157/91 (07 Oct 2019 15:30) (120/80 - 180/100)  BP(mean): --  RR: 18 (07 Oct 2019 15:30) (16 - 20)  SpO2: 97% (07 Oct 2019 15:30) (96% - 100%)    PHYSICAL EXAM:    GENERAL: NAD, resting comfortable in bed , very pleasant   HEENT: NC, AT  CHEST/LUNG: Clear to percussion bilaterally; No wheezing  HEART: S1S2+, Regular rate and rhythm; No murmurs, rubs, or gallops  ABDOMEN: Soft, Nontender, Nondistended; Bowel sounds present  EXTREMITIES:  no pitting edema , pulses palpated BL.      LABS:                        8.1    3.18  )-----------( 137      ( 07 Oct 2019 05:59 )             24.6     10-07    130<L>  |  92<L>  |  <3.0<L>  ----------------------------<  93  3.3<L>   |  25.0  |  0.56    Ca    8.3<L>      07 Oct 2019 05:59    TPro  6.8  /  Alb  2.6<L>  /  TBili  1.4  /  DBili  0.9<H>  /  AST  162<H>  /  ALT  59<H>  /  AlkPhos  120  10-06            MEDICATIONS  (STANDING):  enoxaparin Injectable 90 milliGRAM(s) SubCutaneous two times a day  folic acid 1 milliGRAM(s) Oral daily  lisinopril 5 milliGRAM(s) Oral daily  metoprolol tartrate 25 milliGRAM(s) Oral two times a day  multivitamin 1 Tablet(s) Oral daily  pantoprazole  Injectable 40 milliGRAM(s) IV Push daily  potassium chloride    Tablet ER 40 milliEquivalent(s) Oral daily  saccharomyces boulardii 250 milliGRAM(s) Oral two times a day  sodium chloride 0.9%. 1000 milliLiter(s) (100 mL/Hr) IV Continuous <Continuous>    MEDICATIONS  (PRN):  LORazepam     Tablet 1 milliGRAM(s) Oral every 2 hours PRN CIWA-Ar score increase by 2 points and a total score of 7 or less  LORazepam   Injectable 1 milliGRAM(s) IV Push every 1 hour PRN CIWA-Ar score 8 or greater      RADIOLOGY & ADDITIONAL TESTS:
Internal Medicine Hospitalist Progress Note    Follow up for GI bleed , DVT , anemia   s/p EGD EUS this am 'result reviewed   pt is feeling well   no new complaints  ,  consult appreciated       Vital Signs Last 24 Hrs  T(C): 36.4 (15 Oct 2019 15:36), Max: 36.9 (15 Oct 2019 00:03)  T(F): 97.5 (15 Oct 2019 15:36), Max: 98.5 (15 Oct 2019 00:03)  HR: 67 (15 Oct 2019 15:36) (67 - 76)  BP: 174/92 (15 Oct 2019 15:36) (142/76 - 174/92)  BP(mean): --  RR: 18 (15 Oct 2019 15:36) (18 - 18)  SpO2: 96% (15 Oct 2019 15:36) (96% - 98%)    Constitutional: awake alert , no distress    Respiratory: CTA  bilateral     Cardiovascular: regular s1/s2    Gastrointestinal: soft no tenderness , BS positive     Extremities: no pretibial edema     no calf tenderness                             8.4    4.15  )-----------( 342      ( 15 Oct 2019 08:14 )             25.7       Radiology :          < from: CT Abdomen and Pelvis w/ Oral Cont and w/ IV Cont (10.11.19 @ 21:18) >   The bladder wall measures 11 mm. This is nonspecific and may represent   inflammation or infection. Neoplastic process and neurogenic bladder are   included in the differential.   2. 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 Unruptured aneurysm   right   common iliac artery 17 mm. Unruptured aneurysm left common iliac artery   15 mm.   3. The prostate is enlarged, greater than 5 cm. Recommend urology   consult.   4. Normal appendix.   5. Broad-based disc bulge, facet hypertrophy, and ligament hypertrophy at   L3/L4   and L4/L5 consistent with spinal stenosis. Recommend MRI for further   evaluation.   6. Broad-based disc bulge at L5/S1 may represent degenerative disc   disease.   7. 12 mm mass lower pole left kidney 69 Hounsfield units.. Recommend MR   without   and with contrast or CT without and with contrast. MR is preferred for   masses   under 1.5 cm.      < end of copied text >
Internal Medicine Hospitalist Progress Note    Follow up for GI bleed , DVT , anemia   seen earlier  today in am , pt is feeling well \wants to go home   no new complaints  ,  follow up appreciated         Vital Signs Last 24 Hrs  T(C): 36.6 (16 Oct 2019 15:24), Max: 36.6 (16 Oct 2019 15:24)  T(F): 97.8 (16 Oct 2019 15:24), Max: 97.8 (16 Oct 2019 15:24)  HR: 94 (16 Oct 2019 15:24) (89 - 94)  BP: 128/66 (16 Oct 2019 15:24) (122/73 - 128/66)  BP(mean): --  RR: 18 (16 Oct 2019 15:24) (18 - 18)  SpO2: 98% (16 Oct 2019 15:24) (95% - 98%)    Constitutional: awake alert , no distress    Respiratory: CTA  bilateral     Cardiovascular: regular s1/s2    Gastrointestinal: soft no tenderness , BS positive     Extremities: no pretibial edema     no calf tenderness                                      9.0    8.10  )-----------( 384      ( 16 Oct 2019 11:55 )             27.2   10-16    130<L>  |  97<L>  |  9.0  ----------------------------<  120<H>  4.2   |  22.0  |  0.61    Ca    8.7      16 Oct 2019 11:55  Mg     1.3     10-16      < from: MR Abdomen w/wo IV Cont (10.16.19 @ 08:58) >  IMPRESSION:     11 mm hemorrhagic cyst in the lower pole of the left kidney. Less than 5   mm renal cyst. No enhancing lesions.    Mild bladder wall thickening, better seen on recent CT scan.      < end of copied text >
Internal Medicine Hospitalist Progress Note    Follow up for GI bleed , DVT , anemia   seen examined , pt is feeling well , no complaints   GI follow up appreciated         Vital Signs Last 24 Hrs  T(C): 36.7 (14 Oct 2019 08:05), Max: 36.7 (13 Oct 2019 15:33)  T(F): 98.1 (14 Oct 2019 08:05), Max: 98.1 (13 Oct 2019 15:33)  HR: 71 (14 Oct 2019 08:05) (63 - 71)  BP: 160/88 (13 Oct 2019 23:38) (154/90 - 160/88)  BP(mean): --  RR: 18 (14 Oct 2019 08:05) (18 - 18)  SpO2: 98% (14 Oct 2019 08:05) (98% - 98%)  PHYSICAL EXAM:      Constitutional: awake alert , no distress    Respiratory: CTA  bilateral     Cardiovascular: regular s1/s2    Gastrointestinal: soft no tenderness , BS positive     Extremities: no pretibial edema     no calf tenderness                             8.7    4.44  )-----------( 348      ( 14 Oct 2019 08:07 )             26.3         Radiology :          < from: CT Abdomen and Pelvis w/ Oral Cont and w/ IV Cont (10.11.19 @ 21:18) >   The bladder wall measures 11 mm. This is nonspecific and may represent   inflammation or infection. Neoplastic process and neurogenic bladder are   included in the differential.   2. 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 Unruptured aneurysm   right   common iliac artery 17 mm. Unruptured aneurysm left common iliac artery   15 mm.   3. The prostate is enlarged, greater than 5 cm. Recommend urology   consult.   4. Normal appendix.   5. Broad-based disc bulge, facet hypertrophy, and ligament hypertrophy at   L3/L4   and L4/L5 consistent with spinal stenosis. Recommend MRI for further   evaluation.   6. Broad-based disc bulge at L5/S1 may represent degenerative disc   disease.   7. 12 mm mass lower pole left kidney 69 Hounsfield units.. Recommend MR   without   and with contrast or CT without and with contrast. MR is preferred for   masses   under 1.5 cm.      < end of copied text >
INTERVAL HPI/OVERNIGHT EVENTS:FU for anemia, submucosal duodenal lesion. No complaints.     MEDICATIONS  (STANDING):  folic acid 1 milliGRAM(s) Oral daily  lisinopril 10 milliGRAM(s) Oral daily  metoprolol tartrate 25 milliGRAM(s) Oral two times a day  multivitamin 1 Tablet(s) Oral daily  pantoprazole  Injectable 40 milliGRAM(s) IV Push two times a day  potassium chloride    Tablet ER 40 milliEquivalent(s) Oral daily  saccharomyces boulardii 250 milliGRAM(s) Oral two times a day    MEDICATIONS  (PRN):      Allergies    No Known Allergies    Intolerances        Vital Signs Last 24 Hrs  T(C): 36.6 (13 Oct 2019 23:38), Max: 36.9 (13 Oct 2019 08:27)  T(F): 97.8 (13 Oct 2019 23:38), Max: 98.4 (13 Oct 2019 08:27)  HR: 63 (13 Oct 2019 23:38) (63 - 74)  BP: 160/88 (13 Oct 2019 23:38) (154/90 - 173/89)  BP(mean): --  RR: 18 (13 Oct 2019 23:38) (18 - 18)  SpO2: 98% (13 Oct 2019 15:33) (97% - 98%)    LABS:                        8.2    4.49  )-----------( 309      ( 13 Oct 2019 08:18 )             25.1     10-12    134<L>  |  100  |  5.0<L>  ----------------------------<  89  3.9   |  24.0  |  0.60    Ca    8.5<L>      12 Oct 2019 08:40            RADIOLOGY & ADDITIONAL TESTS:  < from: CT Abdomen and Pelvis w/ Oral Cont and w/ IV Cont (10.11.19 @ 21:18) >     EXAM:  CT ABDOMEN AND PELVIS OC IC                          PROCEDURE DATE:  10/11/2019          INTERPRETATION:  [facility] RADI[facility]IST PRELIMINARY REPORT  Reviewed by GRABIEL Groves M.D.  PROCEDURE INFORMATION:   Exam: CT Abdomen And Pelvis With Contrast  Exam date and time: 10/11/2019 8:58 PM   Clinical history: 68 years old, male; Abdominal pain     TECHNIQUE:   Imaging protocol: Computed tomography of the abdomen and pelvis with   intravenous contrast.     COMPARISON:   No relevant prior studies available.     FINDINGS:     Liver: Normal. No mass.   Gallbladder and bile ducts: Normal. No calcified stones. No ductal   dilation.   Pancreas: Normal. No ductal dilation.   Spleen: Normal. No splenomegaly.   Adrenals: Normal. No mass.   Kidneys andureters: Nonobstructing right renal calculus. Multiple   Subcentimeter low attenuation areas in the left kidney are too small for   characterization. 12 mm mass lower pole left kidney 69 Hounsfield units..   Stomach and bowel: Unremarkable. No obstruction. No mucosal thickening.   Appendix: Normal appendix.   Intraperitoneal space: Unremarkable. No free air. No significant fluid   collection.   Vasculature: Unruptured aneurysm infrarenal aorta 3.1 x 2.8 cm.   Unruptured   aneurysm right common iliac artery 17 mm. Unruptured aneurysm left common   iliac   artery 15 mm.   Lymph nodes: Unremarkable. No enlarged lymph nodes.     Bladder: The bladder wall measures 11 mm. This is nonspecific and may   represent   inflammation or infection. Neoplastic process and neurogenic bladder are   included in the differential.   Reproductive: The prostate is enlarged, greater than 5 cm.    Bones/joints: Broad-based disc bulge, facet hypertrophy, and ligament   hypertrophy at L3/L4 and L4/L5 consistent with spinal stenosis.    Broad-based   disc bulge at L5/S1 may represent degenerative disc disease.   Soft tissues: Unremarkable.     IMPRESSION:   1. The bladder wall measures 11 mm. This is nonspecific and may represent   inflammation or infection. Neoplastic process and neurogenic bladder are   included in the differential.   2. 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 Unruptured aneurysm   right   common iliac artery 17 mm. Unruptured aneurysm left common iliac artery   15 mm.   3. The prostate is enlarged, greater than 5 cm. Recommend urology   consult.   4. Normal appendix.   5. Broad-based disc bulge, facet hypertrophy, and ligament hypertrophy at   L3/L4   and L4/L5 consistent with spinal stenosis. Recommend MRI for further   evaluation.   6. Broad-based disc bulge at L5/S1 may represent degenerative disc   disease.   7. 12 mm mass lower pole left kidney 69 Hounsfield units.. Recommend MR   without   and with contrast or CT without and with contrast. MR is preferred for   masses   under 1.5 cm.                CORTES GROVES M.D., ATTENDING RADIOLOGIST  This document has been electronically signed. Oct 12 2019  8:30AM                  < end of copied text >

## 2019-10-17 NOTE — DISCHARGE NOTE PROVIDER - NSDCCPCAREPLAN_GEN_ALL_CORE_FT
PRINCIPAL DISCHARGE DIAGNOSIS  Diagnosis: Sepsis  Assessment and Plan of Treatment: resolved , febrile ilness      SECONDARY DISCHARGE DIAGNOSES  Diagnosis: Alcohol withdrawal syndrome without complication  Assessment and Plan of Treatment: resolved ,avoid alcohol use    Diagnosis: Deep vein thrombophlebitis of leg, left  Assessment and Plan of Treatment: continue eliquis, obtain new prescrition from your doctor    Diagnosis: Anemia due to blood loss, acute  Assessment and Plan of Treatment: monitor closely for bleed   see your doctor , continue iron supplement    Diagnosis: Acute kidney injury  Assessment and Plan of Treatment: due to rhabdo resolved    Diagnosis: Rhabdomyolysis  Assessment and Plan of Treatment: avoid alcohol use

## 2019-10-17 NOTE — DISCHARGE NOTE PROVIDER - HOSPITAL COURSE
This is a 67 y/o male with h/o HTN, EOTH use who has been laying on the bathroom floor for 2 days, hallucinating, weak with left LE edema. patient mother called EMS. patient currently being admitted with ETOH withdrawal, CIWA 5 status post ativan. Sepsis source unknown, status post LP in the ED  Rhabdomyolysis with KAYLEE and Left LE DVT. He was treted with empirical iv initially day 2 seen by ID , LP is not consistent with meningitis , abx discontinued. 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

## 2019-12-21 NOTE — PATIENT PROFILE ADULT - OVER THE PAST TWO WEEKS HAVE YOU FELT DOWN, DEPRESSED OR HOPELESS?
Patient:   Darby Norwood            MRN: [id number]            FIN: [id number]              Age:   78 years     Sex:  FEMALE     :  40   Associated Diagnoses:   None   Author:   Lyndsey ANAYA     Subjective: No acute events overnight. Denies f/c/n/v/d/c. Pain is well controlled.    Objective:  I & O between:  20-DEC-2019 10:31 TO 21-DEC-2019 10:31  Med Dosing Weight:  47.9  kg   20-DEC-2019  24 Hour Intake:   316.20  ( 6.60 mL/kg )  24 Hour Output:   1230.00           24 Hour Urine/Stool Output:   0.0  24 Hour Balance:   -913.80           24 Hour Urine Output:   1170.00  ( 1.02 mL/kg/hr )                   Urine Count:  1     Vitals between:   20-DEC-2019 10:31:43   TO   21-DEC-2019 10:31:43                   LAST RESULT MINIMUM MAXIMUM  Temperature 36.4 36.3 36.6  Heart Rate 70 63 70  Respiratory Rate 16 16 16  NISBP           121 95 121  NIDBP           80 47 80  NIMBP           94 63 94  SpO2                    95 95 97  Medications (13) Active  Scheduled: (8)  acetaminophen [RESTRICTED]  1,000 mg 100 mL, IVPB, Q8H (variable)  cefTRIAXone  1,000 mg 10 mL, Slow IV Push, Daily  Chlorhexidine gluconate 0.12% ORAL RINSE 15 mL repack  15 mL, Oral Mucosa, Q12H  Enoxaparin 30 mg/0.3 mL syringe  30 mg 0.3 mL, Subcutaneous, Daily  MetoCLOPramide 10 mg/2 mL inj SDV  5 mg 1 mL, Slow IV Push, Q6H  metroNIDAZOLE-NaCl 0.9%  500 mg 100 mL, IVPB, Q8H  Tamsulosin 0.4 mg cap  0.4 mg 1 cap, Oral, Daily [before breakfast]  Tamsulosin 0.4 mg cap  0.4 mg 1 cap, Oral, Once (scheduled)  Continuous: (1)  Lactated Ringers 1,000 mL  1,000 mL, IV, 80 mL/hr  PRN: (4)  Benzocaine-menthol 15-3.6 mg lozenge  1 lozenge, Buccal, As Directed PRN  HYDROmorphone PF 1 mg/1 mL inj SDV  0.2 mg 0.2 mL, Slow IV Push, Q4H (variable)  Nicotine 14 mg/24 hr daily ER patch  1 patch, TransDermal, Daily  Ondansetron 4 mg/2 mL inj SDV  4 mg 2 mL, Slow IV Push, Q6H  Gen: NAD  Head: NCNT  Eyes: no scleral icterus, EOMI, no ptosis  Mouth: oropharynx clear w/o erythema/lesions, MMM  Neck: no gross abnormalities, trachea midline  CV: RRR, no mrg  Resp: CTAB, no wheezes/rales/rhonchi, not using accessory mm, nl resp effort  GI: soft, TTP, dressing cdi, +ostomy, decreased BS, no rebound/guarding  Ext: no clubbing, cyanosis, edema  Skin: warm, no rashes or lesions  Neuro: CN II-XII grossly intact, speech fluent  Psych: alert, oriented, appropriate affect  Labs:  Labs between:  20-DEC-2019 10:31 to 21-DEC-2019 10:31  CBC:                 WBC  HgB  Hct  Plt  MCV  RDW   21-DEC-2019 7.2  (L) 10.7  (L) 32.9  222  96.8  13.2   DIFF:                 Seg  Neutroph//ABS  Lymph//ABS  Mono//ABS  EOS/ABS  [id number] NOT APPLICABLE  84 // 6.0 11 // (L) 0.8  5 // 0.3 0 // (L) 0.0  BMP:                 Na  Cl  BUN  Glu   21-DEC-2019 137  104  14  80                              K  CO2  Cr  Ca                              4.2  24  0.52  (L) 8.3   Other Chem:             Mg  Phos  Triglycerides  GGTP  DirectBili                           2.1  (L) 2.2                                 Radiology:   Assessment and plan:   79 yo F with hx of smoking, basal cell skin cancer, chronic constipation, diverticulosis, admitted with constipation  Chronic Constipation, Partial SBO, Severe Sigmoid Diverticulosis s/p EXPLORATORY LAPAROTOMY,BOWEL RESECTION, STOMA CREATION performed on 12/19/19  -Admitted to colorectal surgery  -Diet orders per surgery service  -post-op pain control: as listed above  -periop abx as determined by primary service  -CEA elevated at 6.1, path pending   -IS, bowel regimen  -PT/OT  -Preop eval completed by Dr Hussain Duckworth  -2-2 above, monitor  leukocytosis  -2-2 above, likely reactive  -no sx/sx active infection   hypoK  hypoPhos  -replace, monitor   Chronic Current Smoker  -Counseled >3 minutes, specific mention of increased cancer, cardiovascular risk as well as poor wound healing postop  -She plans to stop cold turkey  -Offered but declined nicotine supplement for withdrawal, will enter as prn  Prophylaxis  DVT: lovenox    Dispo   - per primary service   - PCP: Ginny Clifford - updated 12/18  Code: full code  Questions addressed at bedside. Greater than 50% of the time spent with the patient spent reviewing the patient records, coordinating patient care plan and discussing the aboe care plan with the patient.   Andrea Torres MD no

## 2020-05-26 NOTE — DIETITIAN INITIAL EVALUATION ADULT. - OTHER INFO
[No Ischemia] : no Ischemia [___] : [unfilled] [LVEF ___%] : LVEF [unfilled]% [None] : normal LV function [Mild] : mild mitral regurgitation [Enlarged] : enlarged LA size Pt with h/o EtOH abuse , HTN.  Pt found on the bathroom floor, hallucinating and weak. Pt admitted for alcoholic withdrawal and sepsis- found to have ARF, Rhabdomyolysis, LLE DVT, severe anemia, alcoholic hepatitis on admission.  Pt reports good po intake at meals, consumed 75% at breakfast per tray observation.  Pt states good appetite prior to admission, but only consumes 2 meals daily.  Pt states # and denies wt loss, however current wt is 158#.  Discussed importance of consuming adequate protein at meals to optimize nutrition status- pt receptive and agreeable.

## 2020-11-30 NOTE — DIETITIAN INITIAL EVALUATION ADULT. - OBTAIN DAILY WEIGHT
Per chart review:    Symptomatic COVID-19 Virus (Coronavirus) by PCR  Order: [id number]  Status:  Edited Result - FINAL   Visible to patient:  No (not released) Dx:  Cough; Tension-type headache, not int...  Specimen Information: Nasopharyngeal        Component 3d ago Resulting Agency   COVID-19 Virus PCR to [facility] - Source Nasopharyngeal  [facility]   COVID-19 Virus PCR to [facility] - Result Not Detected  [facility]         Specimen Collected: 11/27/20  4:41 PM Last Resulted: 11/30/20  3:33 PM            Called and spoke to Patient after verifying last name and date of birth. Pt notified of results. Tamia Lucas RN .............. 11/30/2020  4:39 PM       yes

## 2021-01-01 ENCOUNTER — EMERGENCY (EMERGENCY)
Facility: HOSPITAL | Age: 71
LOS: 1 days | Discharge: HOME | End: 2021-01-01
Attending: EMERGENCY MEDICINE
Payer: MEDICARE

## 2021-01-01 ENCOUNTER — INPATIENT (INPATIENT)
Facility: HOSPITAL | Age: 71
LOS: 1 days | DRG: 208 | End: 2021-10-18
Attending: HOSPITALIST | Admitting: GENERAL ACUTE CARE HOSPITAL
Payer: MEDICARE

## 2021-01-01 VITALS
RESPIRATION RATE: 36 BRPM | HEART RATE: 124 BPM | OXYGEN SATURATION: 64 % | HEIGHT: 73 IN | TEMPERATURE: 98 F | DIASTOLIC BLOOD PRESSURE: 74 MMHG | SYSTOLIC BLOOD PRESSURE: 133 MMHG

## 2021-01-01 VITALS
TEMPERATURE: 98 F | HEIGHT: 73 IN | RESPIRATION RATE: 18 BRPM | OXYGEN SATURATION: 98 % | SYSTOLIC BLOOD PRESSURE: 185 MMHG | DIASTOLIC BLOOD PRESSURE: 92 MMHG | WEIGHT: 179.9 LBS | HEART RATE: 86 BPM

## 2021-01-01 VITALS
DIASTOLIC BLOOD PRESSURE: 90 MMHG | OXYGEN SATURATION: 97 % | SYSTOLIC BLOOD PRESSURE: 164 MMHG | TEMPERATURE: 98 F | RESPIRATION RATE: 18 BRPM | HEART RATE: 86 BPM

## 2021-01-01 VITALS
TEMPERATURE: 98 F | OXYGEN SATURATION: 97 % | DIASTOLIC BLOOD PRESSURE: 87 MMHG | HEART RATE: 73 BPM | SYSTOLIC BLOOD PRESSURE: 160 MMHG | HEIGHT: 73 IN | RESPIRATION RATE: 16 BRPM

## 2021-01-01 DIAGNOSIS — Z98.89 OTHER SPECIFIED POSTPROCEDURAL STATES: Chronic | ICD-10-CM

## 2021-01-01 DIAGNOSIS — I10 ESSENTIAL (PRIMARY) HYPERTENSION: ICD-10-CM

## 2021-01-01 DIAGNOSIS — U07.1 COVID-19: ICD-10-CM

## 2021-01-01 LAB
A1C WITH ESTIMATED AVERAGE GLUCOSE RESULT: 4.6 % — SIGNIFICANT CHANGE UP (ref 4–5.6)
ACANTHOCYTES BLD QL SMEAR: SIGNIFICANT CHANGE UP
ALBUMIN SERPL ELPH-MCNC: 2.5 G/DL — LOW (ref 3.3–5.2)
ALBUMIN SERPL ELPH-MCNC: 2.6 G/DL — LOW (ref 3.3–5.2)
ALBUMIN SERPL ELPH-MCNC: 2.6 G/DL — LOW (ref 3.3–5.2)
ALBUMIN SERPL ELPH-MCNC: 2.7 G/DL — LOW (ref 3.3–5.2)
ALBUMIN SERPL ELPH-MCNC: 2.7 G/DL — LOW (ref 3.3–5.2)
ALBUMIN SERPL ELPH-MCNC: 2.8 G/DL — LOW (ref 3.3–5.2)
ALBUMIN SERPL ELPH-MCNC: 3.3 G/DL — SIGNIFICANT CHANGE UP (ref 3.3–5.2)
ALBUMIN SERPL ELPH-MCNC: 3.5 G/DL — SIGNIFICANT CHANGE UP (ref 3.3–5.2)
ALBUMIN SERPL ELPH-MCNC: 4.2 G/DL — SIGNIFICANT CHANGE UP (ref 3.3–5.2)
ALP SERPL-CCNC: 112 U/L — SIGNIFICANT CHANGE UP (ref 40–120)
ALP SERPL-CCNC: 113 U/L — SIGNIFICANT CHANGE UP (ref 40–120)
ALP SERPL-CCNC: 55 U/L — SIGNIFICANT CHANGE UP (ref 40–120)
ALP SERPL-CCNC: 57 U/L — SIGNIFICANT CHANGE UP (ref 40–120)
ALP SERPL-CCNC: 60 U/L — SIGNIFICANT CHANGE UP (ref 40–120)
ALP SERPL-CCNC: 67 U/L — SIGNIFICANT CHANGE UP (ref 40–120)
ALP SERPL-CCNC: 68 U/L — SIGNIFICANT CHANGE UP (ref 40–120)
ALP SERPL-CCNC: 83 U/L — SIGNIFICANT CHANGE UP (ref 40–120)
ALP SERPL-CCNC: 91 U/L — SIGNIFICANT CHANGE UP (ref 40–120)
ALT FLD-CCNC: 16 U/L — SIGNIFICANT CHANGE UP
ALT FLD-CCNC: 16 U/L — SIGNIFICANT CHANGE UP
ALT FLD-CCNC: 19 U/L — SIGNIFICANT CHANGE UP
ALT FLD-CCNC: 21 U/L — SIGNIFICANT CHANGE UP
ALT FLD-CCNC: 32 U/L — SIGNIFICANT CHANGE UP
ALT FLD-CCNC: 46 U/L — HIGH
AMMONIA BLD-MCNC: 47 UMOL/L — SIGNIFICANT CHANGE UP (ref 11–55)
AMPHET UR-MCNC: NEGATIVE — SIGNIFICANT CHANGE UP
ANION GAP SERPL CALC-SCNC: 11 MMOL/L — SIGNIFICANT CHANGE UP (ref 5–17)
ANION GAP SERPL CALC-SCNC: 15 MMOL/L — SIGNIFICANT CHANGE UP (ref 5–17)
ANION GAP SERPL CALC-SCNC: 16 MMOL/L — SIGNIFICANT CHANGE UP (ref 5–17)
ANION GAP SERPL CALC-SCNC: 17 MMOL/L — SIGNIFICANT CHANGE UP (ref 5–17)
ANION GAP SERPL CALC-SCNC: 17 MMOL/L — SIGNIFICANT CHANGE UP (ref 5–17)
ANION GAP SERPL CALC-SCNC: 18 MMOL/L — HIGH (ref 5–17)
ANION GAP SERPL CALC-SCNC: 18 MMOL/L — HIGH (ref 5–17)
ANION GAP SERPL CALC-SCNC: 27 MMOL/L — HIGH (ref 5–17)
ANISOCYTOSIS BLD QL: SIGNIFICANT CHANGE UP
ANISOCYTOSIS BLD QL: SLIGHT — SIGNIFICANT CHANGE UP
APAP SERPL-MCNC: <3 UG/ML — LOW (ref 10–26)
APPEARANCE UR: CLEAR — SIGNIFICANT CHANGE UP
APTT BLD: 25.9 SEC — LOW (ref 27.5–35.5)
AST SERPL-CCNC: 115 U/L — HIGH
AST SERPL-CCNC: 47 U/L — HIGH
AST SERPL-CCNC: 60 U/L — HIGH
AST SERPL-CCNC: 65 U/L — HIGH
AST SERPL-CCNC: 65 U/L — HIGH
AST SERPL-CCNC: 71 U/L — HIGH
AST SERPL-CCNC: 71 U/L — HIGH
AST SERPL-CCNC: 78 U/L — HIGH
AST SERPL-CCNC: 95 U/L — HIGH
BACTERIA # UR AUTO: ABNORMAL
BARBITURATES UR SCN-MCNC: NEGATIVE — SIGNIFICANT CHANGE UP
BASE EXCESS BLDA CALC-SCNC: -6.8 MMOL/L — LOW (ref -2–3)
BASOPHILS # BLD AUTO: 0 K/UL — SIGNIFICANT CHANGE UP (ref 0–0.2)
BASOPHILS # BLD AUTO: 0 K/UL — SIGNIFICANT CHANGE UP (ref 0–0.2)
BASOPHILS # BLD AUTO: 0.01 K/UL — SIGNIFICANT CHANGE UP (ref 0–0.2)
BASOPHILS # BLD AUTO: 0.02 K/UL — SIGNIFICANT CHANGE UP (ref 0–0.2)
BASOPHILS # BLD AUTO: 0.25 K/UL — HIGH (ref 0–0.2)
BASOPHILS NFR BLD AUTO: 0 % — SIGNIFICANT CHANGE UP (ref 0–2)
BASOPHILS NFR BLD AUTO: 0 % — SIGNIFICANT CHANGE UP (ref 0–2)
BASOPHILS NFR BLD AUTO: 0.1 % — SIGNIFICANT CHANGE UP (ref 0–2)
BASOPHILS NFR BLD AUTO: 0.4 % — SIGNIFICANT CHANGE UP (ref 0–2)
BASOPHILS NFR BLD AUTO: 0.9 % — SIGNIFICANT CHANGE UP (ref 0–2)
BENZODIAZ UR-MCNC: NEGATIVE — SIGNIFICANT CHANGE UP
BILIRUB DIRECT SERPL-MCNC: 0.2 MG/DL — SIGNIFICANT CHANGE UP (ref 0–0.3)
BILIRUB DIRECT SERPL-MCNC: 0.5 MG/DL — HIGH (ref 0–0.3)
BILIRUB DIRECT SERPL-MCNC: 0.5 MG/DL — HIGH (ref 0–0.3)
BILIRUB INDIRECT FLD-MCNC: 0.3 MG/DL — SIGNIFICANT CHANGE UP (ref 0.2–1)
BILIRUB INDIRECT FLD-MCNC: 0.4 MG/DL — SIGNIFICANT CHANGE UP (ref 0.2–1)
BILIRUB INDIRECT FLD-MCNC: 0.6 MG/DL — SIGNIFICANT CHANGE UP (ref 0.2–1)
BILIRUB SERPL-MCNC: 0.5 MG/DL — SIGNIFICANT CHANGE UP (ref 0.4–2)
BILIRUB SERPL-MCNC: 0.6 MG/DL — SIGNIFICANT CHANGE UP (ref 0.4–2)
BILIRUB SERPL-MCNC: 0.6 MG/DL — SIGNIFICANT CHANGE UP (ref 0.4–2)
BILIRUB SERPL-MCNC: 0.7 MG/DL — SIGNIFICANT CHANGE UP (ref 0.4–2)
BILIRUB SERPL-MCNC: 0.7 MG/DL — SIGNIFICANT CHANGE UP (ref 0.4–2)
BILIRUB SERPL-MCNC: 0.9 MG/DL — SIGNIFICANT CHANGE UP (ref 0.4–2)
BILIRUB SERPL-MCNC: 1.1 MG/DL — SIGNIFICANT CHANGE UP (ref 0.4–2)
BILIRUB SERPL-MCNC: 1.1 MG/DL — SIGNIFICANT CHANGE UP (ref 0.4–2)
BILIRUB SERPL-MCNC: 1.2 MG/DL — SIGNIFICANT CHANGE UP (ref 0.4–2)
BILIRUB UR-MCNC: NEGATIVE — SIGNIFICANT CHANGE UP
BLOOD GAS COMMENTS ARTERIAL: SIGNIFICANT CHANGE UP
BUN SERPL-MCNC: 10 MG/DL — SIGNIFICANT CHANGE UP (ref 8–20)
BUN SERPL-MCNC: 128.4 MG/DL — HIGH (ref 8–20)
BUN SERPL-MCNC: 4.2 MG/DL — LOW (ref 8–20)
BUN SERPL-MCNC: 4.6 MG/DL — LOW (ref 8–20)
BUN SERPL-MCNC: 52.5 MG/DL — HIGH (ref 8–20)
BUN SERPL-MCNC: 60.8 MG/DL — HIGH (ref 8–20)
BUN SERPL-MCNC: 88.4 MG/DL — HIGH (ref 8–20)
BUN SERPL-MCNC: 98.7 MG/DL — HIGH (ref 8–20)
BURR CELLS BLD QL SMEAR: PRESENT — SIGNIFICANT CHANGE UP
BURR CELLS BLD QL SMEAR: PRESENT — SIGNIFICANT CHANGE UP
CALCIUM SERPL-MCNC: 7.8 MG/DL — LOW (ref 8.6–10.2)
CALCIUM SERPL-MCNC: 8.1 MG/DL — LOW (ref 8.6–10.2)
CALCIUM SERPL-MCNC: 8.3 MG/DL — LOW (ref 8.6–10.2)
CALCIUM SERPL-MCNC: 8.5 MG/DL — LOW (ref 8.6–10.2)
CALCIUM SERPL-MCNC: 8.5 MG/DL — LOW (ref 8.6–10.2)
CALCIUM SERPL-MCNC: 8.8 MG/DL — SIGNIFICANT CHANGE UP (ref 8.6–10.2)
CALCIUM SERPL-MCNC: 9.3 MG/DL — SIGNIFICANT CHANGE UP (ref 8.6–10.2)
CALCIUM SERPL-MCNC: 9.3 MG/DL — SIGNIFICANT CHANGE UP (ref 8.6–10.2)
CHLORIDE SERPL-SCNC: 109 MMOL/L — HIGH (ref 98–107)
CHLORIDE SERPL-SCNC: 110 MMOL/L — HIGH (ref 98–107)
CHLORIDE SERPL-SCNC: 110 MMOL/L — HIGH (ref 98–107)
CHLORIDE SERPL-SCNC: 113 MMOL/L — HIGH (ref 98–107)
CHLORIDE SERPL-SCNC: 114 MMOL/L — HIGH (ref 98–107)
CHLORIDE SERPL-SCNC: 86 MMOL/L — LOW (ref 98–107)
CHLORIDE SERPL-SCNC: 90 MMOL/L — LOW (ref 98–107)
CHLORIDE SERPL-SCNC: 92 MMOL/L — LOW (ref 98–107)
CK SERPL-CCNC: 73 U/L — SIGNIFICANT CHANGE UP (ref 30–200)
CO2 SERPL-SCNC: 11 MMOL/L — LOW (ref 22–29)
CO2 SERPL-SCNC: 15 MMOL/L — LOW (ref 22–29)
CO2 SERPL-SCNC: 16 MMOL/L — LOW (ref 22–29)
CO2 SERPL-SCNC: 16 MMOL/L — LOW (ref 22–29)
CO2 SERPL-SCNC: 17 MMOL/L — LOW (ref 22–29)
CO2 SERPL-SCNC: 22 MMOL/L — SIGNIFICANT CHANGE UP (ref 22–29)
CO2 SERPL-SCNC: 23 MMOL/L — SIGNIFICANT CHANGE UP (ref 22–29)
CO2 SERPL-SCNC: 26 MMOL/L — SIGNIFICANT CHANGE UP (ref 22–29)
COCAINE METAB.OTHER UR-MCNC: NEGATIVE — SIGNIFICANT CHANGE UP
COLOR SPEC: YELLOW — SIGNIFICANT CHANGE UP
COVID-19 NUCLEOCAPSID GAM AB INTERP: POSITIVE
COVID-19 NUCLEOCAPSID TOTAL GAM ANTIBODY RESULT: 20.3 INDEX — HIGH
COVID-19 SPIKE DOMAIN AB INTERP: POSITIVE
COVID-19 SPIKE DOMAIN ANTIBODY RESULT: 3.8 U/ML — HIGH
CREAT SERPL-MCNC: 0.63 MG/DL — SIGNIFICANT CHANGE UP (ref 0.5–1.3)
CREAT SERPL-MCNC: 0.74 MG/DL — SIGNIFICANT CHANGE UP (ref 0.5–1.3)
CREAT SERPL-MCNC: 0.75 MG/DL — SIGNIFICANT CHANGE UP (ref 0.5–1.3)
CREAT SERPL-MCNC: 1.36 MG/DL — HIGH (ref 0.5–1.3)
CREAT SERPL-MCNC: 1.39 MG/DL — HIGH (ref 0.5–1.3)
CREAT SERPL-MCNC: 1.44 MG/DL — HIGH (ref 0.5–1.3)
CREAT SERPL-MCNC: 2.31 MG/DL — HIGH (ref 0.5–1.3)
CREAT SERPL-MCNC: 2.35 MG/DL — HIGH (ref 0.5–1.3)
CREAT SERPL-MCNC: 3.01 MG/DL — HIGH (ref 0.5–1.3)
CREAT SERPL-MCNC: 3.77 MG/DL — HIGH (ref 0.5–1.3)
CREAT SERPL-MCNC: 4.88 MG/DL — HIGH (ref 0.5–1.3)
CRP SERPL-MCNC: 154 MG/L — HIGH
D DIMER BLD IA.RAPID-MCNC: HIGH NG/ML DDU
DIFF PNL FLD: ABNORMAL
EOSINOPHIL # BLD AUTO: 0 K/UL — SIGNIFICANT CHANGE UP (ref 0–0.5)
EOSINOPHIL # BLD AUTO: 0.01 K/UL — SIGNIFICANT CHANGE UP (ref 0–0.5)
EOSINOPHIL # BLD AUTO: 0.05 K/UL — SIGNIFICANT CHANGE UP (ref 0–0.5)
EOSINOPHIL NFR BLD AUTO: 0 % — SIGNIFICANT CHANGE UP (ref 0–6)
EOSINOPHIL NFR BLD AUTO: 0.2 % — SIGNIFICANT CHANGE UP (ref 0–6)
EOSINOPHIL NFR BLD AUTO: 2 % — SIGNIFICANT CHANGE UP (ref 0–6)
EPI CELLS # UR: NEGATIVE — SIGNIFICANT CHANGE UP
ESTIMATED AVERAGE GLUCOSE: 85 MG/DL — SIGNIFICANT CHANGE UP (ref 68–114)
ETHANOL SERPL-MCNC: 278 MG/DL — HIGH (ref 0–9)
FERRITIN SERPL-MCNC: 3083 NG/ML — HIGH (ref 30–400)
FIBRINOGEN PPP-MCNC: 848 MG/DL — CRITICAL HIGH (ref 290–520)
FLUAV AG NPH QL: SIGNIFICANT CHANGE UP
FLUBV AG NPH QL: SIGNIFICANT CHANGE UP
GAS PNL BLDA: SIGNIFICANT CHANGE UP
GIANT PLATELETS BLD QL SMEAR: PRESENT — SIGNIFICANT CHANGE UP
GLUCOSE BLDC GLUCOMTR-MCNC: 181 MG/DL — HIGH (ref 70–99)
GLUCOSE SERPL-MCNC: 118 MG/DL — HIGH (ref 70–99)
GLUCOSE SERPL-MCNC: 125 MG/DL — HIGH (ref 70–99)
GLUCOSE SERPL-MCNC: 151 MG/DL — HIGH (ref 70–99)
GLUCOSE SERPL-MCNC: 175 MG/DL — HIGH (ref 70–99)
GLUCOSE SERPL-MCNC: 187 MG/DL — HIGH (ref 70–99)
GLUCOSE SERPL-MCNC: 82 MG/DL — SIGNIFICANT CHANGE UP (ref 70–99)
GLUCOSE SERPL-MCNC: 83 MG/DL — SIGNIFICANT CHANGE UP (ref 70–99)
GLUCOSE SERPL-MCNC: 88 MG/DL — SIGNIFICANT CHANGE UP (ref 70–99)
GLUCOSE UR QL: NEGATIVE MG/DL — SIGNIFICANT CHANGE UP
HCO3 BLDA-SCNC: 18 MMOL/L — LOW (ref 21–28)
HCT VFR BLD CALC: 28 % — LOW (ref 39–50)
HCT VFR BLD CALC: 30.3 % — LOW (ref 39–50)
HCT VFR BLD CALC: 31.2 % — LOW (ref 39–50)
HCT VFR BLD CALC: 31.5 % — LOW (ref 39–50)
HCT VFR BLD CALC: 32.3 % — LOW (ref 39–50)
HCT VFR BLD CALC: 39.3 % — SIGNIFICANT CHANGE UP (ref 39–50)
HGB BLD-MCNC: 10.4 G/DL — LOW (ref 13–17)
HGB BLD-MCNC: 10.5 G/DL — LOW (ref 13–17)
HGB BLD-MCNC: 11.5 G/DL — LOW (ref 13–17)
HGB BLD-MCNC: 13.8 G/DL — SIGNIFICANT CHANGE UP (ref 13–17)
HGB BLD-MCNC: 9.3 G/DL — LOW (ref 13–17)
HGB BLD-MCNC: 9.3 G/DL — LOW (ref 13–17)
HOROWITZ INDEX BLDA+IHG-RTO: 100 — SIGNIFICANT CHANGE UP
IMM GRANULOCYTES NFR BLD AUTO: 0.4 % — SIGNIFICANT CHANGE UP (ref 0–1.5)
IMM GRANULOCYTES NFR BLD AUTO: 1.1 % — SIGNIFICANT CHANGE UP (ref 0–1.5)
INR BLD: 1.13 RATIO — SIGNIFICANT CHANGE UP (ref 0.88–1.16)
INR BLD: 1.25 RATIO — HIGH (ref 0.88–1.16)
INR BLD: 1.51 RATIO — HIGH (ref 0.88–1.16)
INR BLD: 2 RATIO — HIGH (ref 0.88–1.16)
KETONES UR-MCNC: NEGATIVE — SIGNIFICANT CHANGE UP
LACTATE BLDV-MCNC: 1.3 MMOL/L — SIGNIFICANT CHANGE UP (ref 0.5–2)
LDH SERPL L TO P-CCNC: 711 U/L — HIGH (ref 98–192)
LEUKOCYTE ESTERASE UR-ACNC: ABNORMAL
LG PLATELETS BLD QL AUTO: SLIGHT — SIGNIFICANT CHANGE UP
LYMPHOCYTES # BLD AUTO: 0.15 K/UL — LOW (ref 1–3.3)
LYMPHOCYTES # BLD AUTO: 0.68 K/UL — LOW (ref 1–3.3)
LYMPHOCYTES # BLD AUTO: 1.08 K/UL — SIGNIFICANT CHANGE UP (ref 1–3.3)
LYMPHOCYTES # BLD AUTO: 1.21 K/UL — SIGNIFICANT CHANGE UP (ref 1–3.3)
LYMPHOCYTES # BLD AUTO: 1.45 K/UL — SIGNIFICANT CHANGE UP (ref 1–3.3)
LYMPHOCYTES # BLD AUTO: 12.1 % — LOW (ref 13–44)
LYMPHOCYTES # BLD AUTO: 2.5 % — LOW (ref 13–44)
LYMPHOCYTES # BLD AUTO: 2.7 % — LOW (ref 13–44)
LYMPHOCYTES # BLD AUTO: 32.2 % — SIGNIFICANT CHANGE UP (ref 13–44)
LYMPHOCYTES # BLD AUTO: 46 % — HIGH (ref 13–44)
MACROCYTES BLD QL: SIGNIFICANT CHANGE UP
MACROCYTES BLD QL: SLIGHT — SIGNIFICANT CHANGE UP
MAGNESIUM SERPL-MCNC: 2 MG/DL — SIGNIFICANT CHANGE UP (ref 1.6–2.6)
MAGNESIUM SERPL-MCNC: 2 MG/DL — SIGNIFICANT CHANGE UP (ref 1.6–2.6)
MAGNESIUM SERPL-MCNC: 2.1 MG/DL — SIGNIFICANT CHANGE UP (ref 1.6–2.6)
MAGNESIUM SERPL-MCNC: 2.4 MG/DL — SIGNIFICANT CHANGE UP (ref 1.6–2.6)
MANUAL SMEAR VERIFICATION: SIGNIFICANT CHANGE UP
MCHC RBC-ENTMCNC: 30.7 GM/DL — LOW (ref 32–36)
MCHC RBC-ENTMCNC: 32.3 PG — SIGNIFICANT CHANGE UP (ref 27–34)
MCHC RBC-ENTMCNC: 32.8 PG — SIGNIFICANT CHANGE UP (ref 27–34)
MCHC RBC-ENTMCNC: 33 GM/DL — SIGNIFICANT CHANGE UP (ref 32–36)
MCHC RBC-ENTMCNC: 33.1 PG — SIGNIFICANT CHANGE UP (ref 27–34)
MCHC RBC-ENTMCNC: 33.2 GM/DL — SIGNIFICANT CHANGE UP (ref 32–36)
MCHC RBC-ENTMCNC: 33.3 PG — SIGNIFICANT CHANGE UP (ref 27–34)
MCHC RBC-ENTMCNC: 33.7 GM/DL — SIGNIFICANT CHANGE UP (ref 32–36)
MCHC RBC-ENTMCNC: 34.4 PG — HIGH (ref 27–34)
MCHC RBC-ENTMCNC: 35.1 GM/DL — SIGNIFICANT CHANGE UP (ref 32–36)
MCHC RBC-ENTMCNC: 35.2 PG — HIGH (ref 27–34)
MCHC RBC-ENTMCNC: 35.6 GM/DL — SIGNIFICANT CHANGE UP (ref 32–36)
MCV RBC AUTO: 100.4 FL — HIGH (ref 80–100)
MCV RBC AUTO: 105.2 FL — HIGH (ref 80–100)
MCV RBC AUTO: 98 FL — SIGNIFICANT CHANGE UP (ref 80–100)
MCV RBC AUTO: 98.4 FL — SIGNIFICANT CHANGE UP (ref 80–100)
MCV RBC AUTO: 98.8 FL — SIGNIFICANT CHANGE UP (ref 80–100)
MCV RBC AUTO: 99.4 FL — SIGNIFICANT CHANGE UP (ref 80–100)
METAMYELOCYTES # FLD: 0.8 % — HIGH (ref 0–0)
METAMYELOCYTES # FLD: 0.9 % — HIGH (ref 0–0)
METHADONE UR-MCNC: NEGATIVE — SIGNIFICANT CHANGE UP
MONOCYTES # BLD AUTO: 0.05 K/UL — SIGNIFICANT CHANGE UP (ref 0–0.9)
MONOCYTES # BLD AUTO: 0.11 K/UL — SIGNIFICANT CHANGE UP (ref 0–0.9)
MONOCYTES # BLD AUTO: 0.44 K/UL — SIGNIFICANT CHANGE UP (ref 0–0.9)
MONOCYTES # BLD AUTO: 0.46 K/UL — SIGNIFICANT CHANGE UP (ref 0–0.9)
MONOCYTES # BLD AUTO: 0.5 K/UL — SIGNIFICANT CHANGE UP (ref 0–0.9)
MONOCYTES NFR BLD AUTO: 0.9 % — LOW (ref 2–14)
MONOCYTES NFR BLD AUTO: 1.7 % — LOW (ref 2–14)
MONOCYTES NFR BLD AUTO: 11.1 % — SIGNIFICANT CHANGE UP (ref 2–14)
MONOCYTES NFR BLD AUTO: 4 % — SIGNIFICANT CHANGE UP (ref 2–14)
MONOCYTES NFR BLD AUTO: 4.9 % — SIGNIFICANT CHANGE UP (ref 2–14)
NEUTROPHILS # BLD AUTO: 1.26 K/UL — LOW (ref 1.8–7.4)
NEUTROPHILS # BLD AUTO: 2.5 K/UL — SIGNIFICANT CHANGE UP (ref 1.8–7.4)
NEUTROPHILS # BLD AUTO: 25.42 K/UL — HIGH (ref 1.8–7.4)
NEUTROPHILS # BLD AUTO: 5.21 K/UL — SIGNIFICANT CHANGE UP (ref 1.8–7.4)
NEUTROPHILS # BLD AUTO: 7.31 K/UL — SIGNIFICANT CHANGE UP (ref 1.8–7.4)
NEUTROPHILS NFR BLD AUTO: 48 % — SIGNIFICANT CHANGE UP (ref 43–77)
NEUTROPHILS NFR BLD AUTO: 55.7 % — SIGNIFICANT CHANGE UP (ref 43–77)
NEUTROPHILS NFR BLD AUTO: 81.8 % — HIGH (ref 43–77)
NEUTROPHILS NFR BLD AUTO: 93.2 % — HIGH (ref 43–77)
NEUTROPHILS NFR BLD AUTO: 93.7 % — HIGH (ref 43–77)
NEUTS BAND # BLD: 0.9 % — SIGNIFICANT CHANGE UP (ref 0–8)
NITRITE UR-MCNC: NEGATIVE — SIGNIFICANT CHANGE UP
NRBC # BLD: 0 /100 — SIGNIFICANT CHANGE UP (ref 0–0)
NT-PROBNP SERPL-SCNC: 5262 PG/ML — HIGH (ref 0–300)
OPIATES UR-MCNC: NEGATIVE — SIGNIFICANT CHANGE UP
OSMOLALITY UR: 166 MOSM/KG — LOW (ref 300–1000)
OVALOCYTES BLD QL SMEAR: SLIGHT — SIGNIFICANT CHANGE UP
PCO2 BLDA: 29 MMHG — LOW (ref 35–48)
PCP SPEC-MCNC: SIGNIFICANT CHANGE UP
PCP UR-MCNC: NEGATIVE — SIGNIFICANT CHANGE UP
PH BLDA: 7.4 — SIGNIFICANT CHANGE UP (ref 7.35–7.45)
PH UR: 6 — SIGNIFICANT CHANGE UP (ref 5–8)
PHOSPHATE SERPL-MCNC: 4.4 MG/DL — SIGNIFICANT CHANGE UP (ref 2.4–4.7)
PHOSPHATE SERPL-MCNC: 5.4 MG/DL — HIGH (ref 2.4–4.7)
PHOSPHATE SERPL-MCNC: 6.3 MG/DL — HIGH (ref 2.4–4.7)
PHOSPHATE SERPL-MCNC: 9.5 MG/DL — HIGH (ref 2.4–4.7)
PLAT MORPH BLD: ABNORMAL
PLAT MORPH BLD: NORMAL — SIGNIFICANT CHANGE UP
PLAT MORPH BLD: NORMAL — SIGNIFICANT CHANGE UP
PLATELET # BLD AUTO: 120 K/UL — LOW (ref 150–400)
PLATELET # BLD AUTO: 136 K/UL — LOW (ref 150–400)
PLATELET # BLD AUTO: 148 K/UL — LOW (ref 150–400)
PLATELET # BLD AUTO: 163 K/UL — SIGNIFICANT CHANGE UP (ref 150–400)
PLATELET # BLD AUTO: 176 K/UL — SIGNIFICANT CHANGE UP (ref 150–400)
PLATELET # BLD AUTO: 236 K/UL — SIGNIFICANT CHANGE UP (ref 150–400)
PO2 BLDA: 189 MMHG — HIGH (ref 83–108)
POIKILOCYTOSIS BLD QL AUTO: SIGNIFICANT CHANGE UP
POIKILOCYTOSIS BLD QL AUTO: SLIGHT — SIGNIFICANT CHANGE UP
POLYCHROMASIA BLD QL SMEAR: SLIGHT — SIGNIFICANT CHANGE UP
POLYCHROMASIA BLD QL SMEAR: SLIGHT — SIGNIFICANT CHANGE UP
POTASSIUM SERPL-MCNC: 3.9 MMOL/L — SIGNIFICANT CHANGE UP (ref 3.5–5.3)
POTASSIUM SERPL-MCNC: 3.9 MMOL/L — SIGNIFICANT CHANGE UP (ref 3.5–5.3)
POTASSIUM SERPL-MCNC: 4 MMOL/L — SIGNIFICANT CHANGE UP (ref 3.5–5.3)
POTASSIUM SERPL-MCNC: 4.5 MMOL/L — SIGNIFICANT CHANGE UP (ref 3.5–5.3)
POTASSIUM SERPL-MCNC: 4.6 MMOL/L — SIGNIFICANT CHANGE UP (ref 3.5–5.3)
POTASSIUM SERPL-MCNC: 4.6 MMOL/L — SIGNIFICANT CHANGE UP (ref 3.5–5.3)
POTASSIUM SERPL-MCNC: 4.7 MMOL/L — SIGNIFICANT CHANGE UP (ref 3.5–5.3)
POTASSIUM SERPL-MCNC: 5.7 MMOL/L — HIGH (ref 3.5–5.3)
POTASSIUM SERPL-SCNC: 3.9 MMOL/L — SIGNIFICANT CHANGE UP (ref 3.5–5.3)
POTASSIUM SERPL-SCNC: 3.9 MMOL/L — SIGNIFICANT CHANGE UP (ref 3.5–5.3)
POTASSIUM SERPL-SCNC: 4 MMOL/L — SIGNIFICANT CHANGE UP (ref 3.5–5.3)
POTASSIUM SERPL-SCNC: 4.5 MMOL/L — SIGNIFICANT CHANGE UP (ref 3.5–5.3)
POTASSIUM SERPL-SCNC: 4.6 MMOL/L — SIGNIFICANT CHANGE UP (ref 3.5–5.3)
POTASSIUM SERPL-SCNC: 4.6 MMOL/L — SIGNIFICANT CHANGE UP (ref 3.5–5.3)
POTASSIUM SERPL-SCNC: 4.7 MMOL/L — SIGNIFICANT CHANGE UP (ref 3.5–5.3)
POTASSIUM SERPL-SCNC: 5.7 MMOL/L — HIGH (ref 3.5–5.3)
PROCALCITONIN SERPL-MCNC: 2.08 NG/ML — HIGH (ref 0.02–0.1)
PROCALCITONIN SERPL-MCNC: 2.93 NG/ML — HIGH (ref 0.02–0.1)
PROT SERPL-MCNC: 6.7 G/DL — SIGNIFICANT CHANGE UP (ref 6.6–8.7)
PROT SERPL-MCNC: 7.1 G/DL — SIGNIFICANT CHANGE UP (ref 6.6–8.7)
PROT SERPL-MCNC: 7.2 G/DL — SIGNIFICANT CHANGE UP (ref 6.6–8.7)
PROT SERPL-MCNC: 7.2 G/DL — SIGNIFICANT CHANGE UP (ref 6.6–8.7)
PROT SERPL-MCNC: 7.3 G/DL — SIGNIFICANT CHANGE UP (ref 6.6–8.7)
PROT SERPL-MCNC: 7.5 G/DL — SIGNIFICANT CHANGE UP (ref 6.6–8.7)
PROT SERPL-MCNC: 7.6 G/DL — SIGNIFICANT CHANGE UP (ref 6.6–8.7)
PROT SERPL-MCNC: 8.6 G/DL — SIGNIFICANT CHANGE UP (ref 6.6–8.7)
PROT SERPL-MCNC: 8.7 G/DL — SIGNIFICANT CHANGE UP (ref 6.6–8.7)
PROT UR-MCNC: 100 MG/DL
PROTHROM AB SERPL-ACNC: 13 SEC — SIGNIFICANT CHANGE UP (ref 10.6–13.6)
PROTHROM AB SERPL-ACNC: 14.3 SEC — HIGH (ref 10.6–13.6)
PROTHROM AB SERPL-ACNC: 17.2 SEC — HIGH (ref 10.6–13.6)
PROTHROM AB SERPL-ACNC: 22.5 SEC — HIGH (ref 10.6–13.6)
RBC # BLD: 2.79 M/UL — LOW (ref 4.2–5.8)
RBC # BLD: 2.88 M/UL — LOW (ref 4.2–5.8)
RBC # BLD: 3.17 M/UL — LOW (ref 4.2–5.8)
RBC # BLD: 3.17 M/UL — LOW (ref 4.2–5.8)
RBC # BLD: 3.27 M/UL — LOW (ref 4.2–5.8)
RBC # BLD: 4.01 M/UL — LOW (ref 4.2–5.8)
RBC # FLD: 11 % — SIGNIFICANT CHANGE UP (ref 10.3–14.5)
RBC # FLD: 11.7 % — SIGNIFICANT CHANGE UP (ref 10.3–14.5)
RBC # FLD: 12.4 % — SIGNIFICANT CHANGE UP (ref 10.3–14.5)
RBC # FLD: 12.6 % — SIGNIFICANT CHANGE UP (ref 10.3–14.5)
RBC # FLD: 13 % — SIGNIFICANT CHANGE UP (ref 10.3–14.5)
RBC # FLD: 14.8 % — HIGH (ref 10.3–14.5)
RBC BLD AUTO: ABNORMAL
RBC BLD AUTO: ABNORMAL
RBC BLD AUTO: NORMAL — SIGNIFICANT CHANGE UP
RBC CASTS # UR COMP ASSIST: SIGNIFICANT CHANGE UP /HPF (ref 0–4)
RSV RNA NPH QL NAA+NON-PROBE: SIGNIFICANT CHANGE UP
SALICYLATES SERPL-MCNC: <0.6 MG/DL — LOW (ref 10–20)
SAO2 % BLDA: 100 % — HIGH (ref 94–98)
SARS-COV-2 IGG+IGM SERPL QL IA: 20.3 INDEX — HIGH
SARS-COV-2 IGG+IGM SERPL QL IA: 3.8 U/ML — HIGH
SARS-COV-2 IGG+IGM SERPL QL IA: POSITIVE
SARS-COV-2 IGG+IGM SERPL QL IA: POSITIVE
SARS-COV-2 RNA SPEC QL NAA+PROBE: DETECTED
SARS-COV-2 RNA SPEC QL NAA+PROBE: DETECTED
SARS-COV-2 RNA SPEC QL NAA+PROBE: SIGNIFICANT CHANGE UP
SARS-COV-2 RNA SPEC QL NAA+PROBE: SIGNIFICANT CHANGE UP
SCHISTOCYTES BLD QL AUTO: SLIGHT — SIGNIFICANT CHANGE UP
SMUDGE CELLS # BLD: PRESENT — SIGNIFICANT CHANGE UP
SMUDGE CELLS # BLD: PRESENT — SIGNIFICANT CHANGE UP
SODIUM SERPL-SCNC: 126 MMOL/L — LOW (ref 135–145)
SODIUM SERPL-SCNC: 127 MMOL/L — LOW (ref 135–145)
SODIUM SERPL-SCNC: 132 MMOL/L — LOW (ref 135–145)
SODIUM SERPL-SCNC: 141 MMOL/L — SIGNIFICANT CHANGE UP (ref 135–145)
SODIUM SERPL-SCNC: 143 MMOL/L — SIGNIFICANT CHANGE UP (ref 135–145)
SODIUM SERPL-SCNC: 145 MMOL/L — SIGNIFICANT CHANGE UP (ref 135–145)
SODIUM SERPL-SCNC: 147 MMOL/L — HIGH (ref 135–145)
SODIUM SERPL-SCNC: 148 MMOL/L — HIGH (ref 135–145)
SODIUM UR-SCNC: <30 MMOL/L — SIGNIFICANT CHANGE UP
SP GR SPEC: 1.01 — SIGNIFICANT CHANGE UP (ref 1.01–1.02)
SPHEROCYTES BLD QL SMEAR: SLIGHT — SIGNIFICANT CHANGE UP
T3 SERPL-MCNC: 71 NG/DL — LOW (ref 80–200)
T4 AB SER-ACNC: 3.1 UG/DL — LOW (ref 4.5–12)
THC UR QL: NEGATIVE — SIGNIFICANT CHANGE UP
TROPONIN T SERPL-MCNC: 0.04 NG/ML — SIGNIFICANT CHANGE UP (ref 0–0.06)
TSH SERPL-MCNC: 0.71 UIU/ML — SIGNIFICANT CHANGE UP (ref 0.27–4.2)
UROBILINOGEN FLD QL: NEGATIVE MG/DL — SIGNIFICANT CHANGE UP
VARIANT LYMPHS # BLD: 0.9 % — SIGNIFICANT CHANGE UP (ref 0–6)
VARIANT LYMPHS # BLD: 0.9 % — SIGNIFICANT CHANGE UP (ref 0–6)
WBC # BLD: 10.58 K/UL — HIGH (ref 3.8–10.5)
WBC # BLD: 2.63 K/UL — LOW (ref 3.8–10.5)
WBC # BLD: 27.28 K/UL — HIGH (ref 3.8–10.5)
WBC # BLD: 4.5 K/UL — SIGNIFICANT CHANGE UP (ref 3.8–10.5)
WBC # BLD: 5.51 K/UL — SIGNIFICANT CHANGE UP (ref 3.8–10.5)
WBC # BLD: 8.94 K/UL — SIGNIFICANT CHANGE UP (ref 3.8–10.5)
WBC # FLD AUTO: 10.58 K/UL — HIGH (ref 3.8–10.5)
WBC # FLD AUTO: 2.63 K/UL — LOW (ref 3.8–10.5)
WBC # FLD AUTO: 27.28 K/UL — HIGH (ref 3.8–10.5)
WBC # FLD AUTO: 4.5 K/UL — SIGNIFICANT CHANGE UP (ref 3.8–10.5)
WBC # FLD AUTO: 5.51 K/UL — SIGNIFICANT CHANGE UP (ref 3.8–10.5)
WBC # FLD AUTO: 8.94 K/UL — SIGNIFICANT CHANGE UP (ref 3.8–10.5)
WBC UR QL: ABNORMAL

## 2021-01-01 PROCEDURE — 96361 HYDRATE IV INFUSION ADD-ON: CPT

## 2021-01-01 PROCEDURE — 85025 COMPLETE CBC W/AUTO DIFF WBC: CPT

## 2021-01-01 PROCEDURE — 90937 HEMODIALYSIS REPEATED EVAL: CPT

## 2021-01-01 PROCEDURE — 99285 EMERGENCY DEPT VISIT HI MDM: CPT | Mod: 25

## 2021-01-01 PROCEDURE — 96376 TX/PRO/DX INJ SAME DRUG ADON: CPT

## 2021-01-01 PROCEDURE — 71045 X-RAY EXAM CHEST 1 VIEW: CPT | Mod: 26

## 2021-01-01 PROCEDURE — G1004: CPT

## 2021-01-01 PROCEDURE — 99291 CRITICAL CARE FIRST HOUR: CPT | Mod: CS

## 2021-01-01 PROCEDURE — 80307 DRUG TEST PRSMV CHEM ANLYZR: CPT

## 2021-01-01 PROCEDURE — 70450 CT HEAD/BRAIN W/O DYE: CPT | Mod: 26,MG

## 2021-01-01 PROCEDURE — 99217: CPT

## 2021-01-01 PROCEDURE — 93010 ELECTROCARDIOGRAM REPORT: CPT

## 2021-01-01 PROCEDURE — 99285 EMERGENCY DEPT VISIT HI MDM: CPT

## 2021-01-01 PROCEDURE — 84300 ASSAY OF URINE SODIUM: CPT

## 2021-01-01 PROCEDURE — 72125 CT NECK SPINE W/O DYE: CPT | Mod: 26,MG

## 2021-01-01 PROCEDURE — 93005 ELECTROCARDIOGRAM TRACING: CPT

## 2021-01-01 PROCEDURE — 96360 HYDRATION IV INFUSION INIT: CPT

## 2021-01-01 PROCEDURE — 36415 COLL VENOUS BLD VENIPUNCTURE: CPT

## 2021-01-01 PROCEDURE — 80048 BASIC METABOLIC PNL TOTAL CA: CPT

## 2021-01-01 PROCEDURE — 71046 X-RAY EXAM CHEST 2 VIEWS: CPT | Mod: 26

## 2021-01-01 PROCEDURE — 71046 X-RAY EXAM CHEST 2 VIEWS: CPT

## 2021-01-01 PROCEDURE — U0005: CPT

## 2021-01-01 PROCEDURE — 80053 COMPREHEN METABOLIC PANEL: CPT

## 2021-01-01 PROCEDURE — 70450 CT HEAD/BRAIN W/O DYE: CPT | Mod: MG

## 2021-01-01 PROCEDURE — 99283 EMERGENCY DEPT VISIT LOW MDM: CPT

## 2021-01-01 PROCEDURE — 99283 EMERGENCY DEPT VISIT LOW MDM: CPT | Mod: 25

## 2021-01-01 PROCEDURE — 83935 ASSAY OF URINE OSMOLALITY: CPT

## 2021-01-01 PROCEDURE — 99232 SBSQ HOSP IP/OBS MODERATE 35: CPT

## 2021-01-01 PROCEDURE — 99284 EMERGENCY DEPT VISIT MOD MDM: CPT

## 2021-01-01 PROCEDURE — U0003: CPT

## 2021-01-01 PROCEDURE — 99223 1ST HOSP IP/OBS HIGH 75: CPT

## 2021-01-01 PROCEDURE — 99218: CPT

## 2021-01-01 PROCEDURE — 84480 ASSAY TRIIODOTHYRONINE (T3): CPT

## 2021-01-01 PROCEDURE — 99222 1ST HOSP IP/OBS MODERATE 55: CPT

## 2021-01-01 PROCEDURE — 96374 THER/PROPH/DIAG INJ IV PUSH: CPT

## 2021-01-01 PROCEDURE — 71275 CT ANGIOGRAPHY CHEST: CPT | Mod: 26,ME

## 2021-01-01 PROCEDURE — 84436 ASSAY OF TOTAL THYROXINE: CPT

## 2021-01-01 PROCEDURE — 81001 URINALYSIS AUTO W/SCOPE: CPT

## 2021-01-01 PROCEDURE — 99291 CRITICAL CARE FIRST HOUR: CPT

## 2021-01-01 PROCEDURE — 84443 ASSAY THYROID STIM HORMONE: CPT

## 2021-01-01 PROCEDURE — G0378: CPT

## 2021-01-01 PROCEDURE — 99223 1ST HOSP IP/OBS HIGH 75: CPT | Mod: 25

## 2021-01-01 PROCEDURE — 72125 CT NECK SPINE W/O DYE: CPT

## 2021-01-01 RX ORDER — CHLORHEXIDINE GLUCONATE 213 G/1000ML
1 SOLUTION TOPICAL
Refills: 0 | Status: DISCONTINUED | OUTPATIENT
Start: 2021-01-01 | End: 2021-01-01

## 2021-01-01 RX ORDER — PANTOPRAZOLE SODIUM 20 MG/1
40 TABLET, DELAYED RELEASE ORAL DAILY
Refills: 0 | Status: DISCONTINUED | OUTPATIENT
Start: 2021-01-01 | End: 2021-01-01

## 2021-01-01 RX ORDER — LABETALOL HCL 100 MG
5 TABLET ORAL ONCE
Refills: 0 | Status: COMPLETED | OUTPATIENT
Start: 2021-01-01 | End: 2021-01-01

## 2021-01-01 RX ORDER — ASCORBIC ACID 60 MG
1 TABLET,CHEWABLE ORAL
Qty: 0 | Refills: 0 | DISCHARGE

## 2021-01-01 RX ORDER — MIDAZOLAM HYDROCHLORIDE 1 MG/ML
2 INJECTION, SOLUTION INTRAMUSCULAR; INTRAVENOUS
Refills: 0 | Status: DISCONTINUED | OUTPATIENT
Start: 2021-01-01 | End: 2021-01-01

## 2021-01-01 RX ORDER — LABETALOL HCL 100 MG
10 TABLET ORAL ONCE
Refills: 0 | Status: COMPLETED | OUTPATIENT
Start: 2021-01-01 | End: 2021-01-01

## 2021-01-01 RX ORDER — MIDAZOLAM HYDROCHLORIDE 1 MG/ML
2 INJECTION, SOLUTION INTRAMUSCULAR; INTRAVENOUS ONCE
Refills: 0 | Status: DISCONTINUED | OUTPATIENT
Start: 2021-01-01 | End: 2021-01-01

## 2021-01-01 RX ORDER — PROPOFOL 10 MG/ML
20 INJECTION, EMULSION INTRAVENOUS
Qty: 1000 | Refills: 0 | Status: DISCONTINUED | OUTPATIENT
Start: 2021-01-01 | End: 2021-01-01

## 2021-01-01 RX ORDER — FOLIC ACID 0.8 MG
1 TABLET ORAL
Qty: 0 | Refills: 0 | DISCHARGE

## 2021-01-01 RX ORDER — OMEPRAZOLE 10 MG/1
1 CAPSULE, DELAYED RELEASE ORAL
Qty: 0 | Refills: 0 | DISCHARGE

## 2021-01-01 RX ORDER — HYDROMORPHONE HYDROCHLORIDE 2 MG/ML
5 INJECTION INTRAMUSCULAR; INTRAVENOUS; SUBCUTANEOUS
Qty: 100 | Refills: 0 | Status: DISCONTINUED | OUTPATIENT
Start: 2021-01-01 | End: 2021-01-01

## 2021-01-01 RX ORDER — ACETAMINOPHEN 500 MG
650 TABLET ORAL ONCE
Refills: 0 | Status: DISCONTINUED | OUTPATIENT
Start: 2021-01-01 | End: 2021-01-01

## 2021-01-01 RX ORDER — FOLIC ACID 0.8 MG
1 TABLET ORAL DAILY
Refills: 0 | Status: DISCONTINUED | OUTPATIENT
Start: 2021-01-01 | End: 2021-01-01

## 2021-01-01 RX ORDER — MORPHINE SULFATE 50 MG/1
2 CAPSULE, EXTENDED RELEASE ORAL ONCE
Refills: 0 | Status: DISCONTINUED | OUTPATIENT
Start: 2021-01-01 | End: 2021-01-01

## 2021-01-01 RX ORDER — PROPOFOL 10 MG/ML
100 INJECTION, EMULSION INTRAVENOUS ONCE
Refills: 0 | Status: COMPLETED | OUTPATIENT
Start: 2021-01-01 | End: 2021-01-01

## 2021-01-01 RX ORDER — SODIUM CHLORIDE 9 MG/ML
1000 INJECTION, SOLUTION INTRAVENOUS ONCE
Refills: 0 | Status: COMPLETED | OUTPATIENT
Start: 2021-01-01 | End: 2021-01-01

## 2021-01-01 RX ORDER — SODIUM CHLORIDE 9 MG/ML
1000 INJECTION, SOLUTION INTRAVENOUS
Refills: 0 | Status: DISCONTINUED | OUTPATIENT
Start: 2021-01-01 | End: 2021-01-01

## 2021-01-01 RX ORDER — ENOXAPARIN SODIUM 100 MG/ML
40 INJECTION SUBCUTANEOUS DAILY
Refills: 0 | Status: DISCONTINUED | OUTPATIENT
Start: 2021-01-01 | End: 2021-01-01

## 2021-01-01 RX ORDER — SODIUM CHLORIDE 9 MG/ML
1 INJECTION INTRAMUSCULAR; INTRAVENOUS; SUBCUTANEOUS
Qty: 0 | Refills: 0 | DISCHARGE

## 2021-01-01 RX ORDER — FENTANYL CITRATE 50 UG/ML
0.5 INJECTION INTRAVENOUS
Qty: 2500 | Refills: 0 | Status: DISCONTINUED | OUTPATIENT
Start: 2021-01-01 | End: 2021-01-01

## 2021-01-01 RX ORDER — HYDROMORPHONE HYDROCHLORIDE 2 MG/ML
1 INJECTION INTRAMUSCULAR; INTRAVENOUS; SUBCUTANEOUS
Refills: 0 | Status: DISCONTINUED | OUTPATIENT
Start: 2021-01-01 | End: 2021-01-01

## 2021-01-01 RX ORDER — HYDROCHLOROTHIAZIDE 25 MG
25 TABLET ORAL DAILY
Refills: 0 | Status: DISCONTINUED | OUTPATIENT
Start: 2021-01-01 | End: 2021-01-01

## 2021-01-01 RX ORDER — SODIUM BICARBONATE 1 MEQ/ML
0.13 SYRINGE (ML) INTRAVENOUS
Qty: 150 | Refills: 0 | Status: DISCONTINUED | OUTPATIENT
Start: 2021-01-01 | End: 2021-01-01

## 2021-01-01 RX ORDER — DEXAMETHASONE 0.5 MG/5ML
20 ELIXIR ORAL DAILY
Refills: 0 | Status: DISCONTINUED | OUTPATIENT
Start: 2021-01-01 | End: 2021-01-01

## 2021-01-01 RX ORDER — HYDRALAZINE HCL 50 MG
10 TABLET ORAL EVERY 6 HOURS
Refills: 0 | Status: DISCONTINUED | OUTPATIENT
Start: 2021-01-01 | End: 2021-01-01

## 2021-01-01 RX ORDER — LOPERAMIDE HCL 2 MG
2 TABLET ORAL ONCE
Refills: 0 | Status: COMPLETED | OUTPATIENT
Start: 2021-01-01 | End: 2021-01-01

## 2021-01-01 RX ORDER — REMDESIVIR 5 MG/ML
200 INJECTION INTRAVENOUS EVERY 24 HOURS
Refills: 0 | Status: COMPLETED | OUTPATIENT
Start: 2021-01-01 | End: 2021-01-01

## 2021-01-01 RX ORDER — ETOMIDATE 2 MG/ML
20 INJECTION INTRAVENOUS ONCE
Refills: 0 | Status: COMPLETED | OUTPATIENT
Start: 2021-01-01 | End: 2021-01-01

## 2021-01-01 RX ORDER — CHOLECALCIFEROL (VITAMIN D3) 125 MCG
1 CAPSULE ORAL
Qty: 0 | Refills: 0 | DISCHARGE

## 2021-01-01 RX ORDER — MORPHINE SULFATE 50 MG/1
2 CAPSULE, EXTENDED RELEASE ORAL EVERY 4 HOURS
Refills: 0 | Status: DISCONTINUED | OUTPATIENT
Start: 2021-01-01 | End: 2021-01-01

## 2021-01-01 RX ORDER — NOREPINEPHRINE BITARTRATE/D5W 8 MG/250ML
0.05 PLASTIC BAG, INJECTION (ML) INTRAVENOUS
Qty: 16 | Refills: 0 | Status: DISCONTINUED | OUTPATIENT
Start: 2021-01-01 | End: 2021-01-01

## 2021-01-01 RX ORDER — METOPROLOL TARTRATE 50 MG
1 TABLET ORAL
Qty: 0 | Refills: 0 | DISCHARGE

## 2021-01-01 RX ORDER — SODIUM CHLORIDE 9 MG/ML
1000 INJECTION INTRAMUSCULAR; INTRAVENOUS; SUBCUTANEOUS ONCE
Refills: 0 | Status: COMPLETED | OUTPATIENT
Start: 2021-01-01 | End: 2021-01-01

## 2021-01-01 RX ORDER — METOPROLOL TARTRATE 50 MG
10 TABLET ORAL ONCE
Refills: 0 | Status: DISCONTINUED | OUTPATIENT
Start: 2021-01-01 | End: 2021-01-01

## 2021-01-01 RX ORDER — METOCLOPRAMIDE HCL 10 MG
10 TABLET ORAL ONCE
Refills: 0 | Status: COMPLETED | OUTPATIENT
Start: 2021-01-01 | End: 2021-01-01

## 2021-01-01 RX ORDER — CEFEPIME 1 G/1
1 INJECTION, POWDER, FOR SOLUTION INTRAMUSCULAR; INTRAVENOUS
Qty: 0 | Refills: 0 | DISCHARGE

## 2021-01-01 RX ORDER — POTASSIUM CHLORIDE 20 MEQ
1 PACKET (EA) ORAL
Qty: 0 | Refills: 0 | DISCHARGE

## 2021-01-01 RX ORDER — THIAMINE MONONITRATE (VIT B1) 100 MG
100 TABLET ORAL DAILY
Refills: 0 | Status: DISCONTINUED | OUTPATIENT
Start: 2021-01-01 | End: 2021-01-01

## 2021-01-01 RX ORDER — EPINEPHRINE 0.3 MG/.3ML
0.5 INJECTION INTRAMUSCULAR; SUBCUTANEOUS ONCE
Refills: 0 | Status: DISCONTINUED | OUTPATIENT
Start: 2021-01-01 | End: 2021-01-01

## 2021-01-01 RX ORDER — SODIUM BICARBONATE 1 MEQ/ML
150 SYRINGE (ML) INTRAVENOUS ONCE
Refills: 0 | Status: DISCONTINUED | OUTPATIENT
Start: 2021-01-01 | End: 2021-01-01

## 2021-01-01 RX ORDER — GUAIFENESIN/DEXTROMETHORPHAN 600MG-30MG
200 TABLET, EXTENDED RELEASE 12 HR ORAL EVERY 4 HOURS
Refills: 0 | Status: DISCONTINUED | OUTPATIENT
Start: 2021-01-01 | End: 2021-01-01

## 2021-01-01 RX ORDER — HYDROMORPHONE HYDROCHLORIDE 2 MG/ML
2 INJECTION INTRAMUSCULAR; INTRAVENOUS; SUBCUTANEOUS ONCE
Refills: 0 | Status: DISCONTINUED | OUTPATIENT
Start: 2021-01-01 | End: 2021-01-01

## 2021-01-01 RX ORDER — ASPIRIN/CALCIUM CARB/MAGNESIUM 324 MG
1 TABLET ORAL
Qty: 0 | Refills: 0 | DISCHARGE

## 2021-01-01 RX ORDER — REMDESIVIR 5 MG/ML
100 INJECTION INTRAVENOUS EVERY 24 HOURS
Refills: 0 | Status: DISCONTINUED | OUTPATIENT
Start: 2021-01-01 | End: 2021-01-01

## 2021-01-01 RX ORDER — ATROPINE SULFATE 0.1 MG/ML
0.5 SYRINGE (ML) INJECTION ONCE
Refills: 0 | Status: DISCONTINUED | OUTPATIENT
Start: 2021-01-01 | End: 2021-01-01

## 2021-01-01 RX ORDER — HYDRALAZINE HCL 50 MG
10 TABLET ORAL ONCE
Refills: 0 | Status: COMPLETED | OUTPATIENT
Start: 2021-01-01 | End: 2021-01-01

## 2021-01-01 RX ORDER — DEXMEDETOMIDINE HYDROCHLORIDE IN 0.9% SODIUM CHLORIDE 4 UG/ML
0.4 INJECTION INTRAVENOUS
Qty: 200 | Refills: 0 | Status: DISCONTINUED | OUTPATIENT
Start: 2021-01-01 | End: 2021-01-01

## 2021-01-01 RX ORDER — HYDRALAZINE HCL 50 MG
10 TABLET ORAL EVERY 4 HOURS
Refills: 0 | Status: DISCONTINUED | OUTPATIENT
Start: 2021-01-01 | End: 2021-01-01

## 2021-01-01 RX ORDER — ACETAMINOPHEN 500 MG
2 TABLET ORAL
Qty: 0 | Refills: 0 | DISCHARGE

## 2021-01-01 RX ORDER — DEXAMETHASONE 0.5 MG/5ML
6 ELIXIR ORAL ONCE
Refills: 0 | Status: COMPLETED | OUTPATIENT
Start: 2021-01-01 | End: 2021-01-01

## 2021-01-01 RX ORDER — METOPROLOL TARTRATE 50 MG
100 TABLET ORAL DAILY
Refills: 0 | Status: DISCONTINUED | OUTPATIENT
Start: 2021-01-01 | End: 2021-01-01

## 2021-01-01 RX ORDER — IPRATROPIUM/ALBUTEROL SULFATE 18-103MCG
3 AEROSOL WITH ADAPTER (GRAM) INHALATION ONCE
Refills: 0 | Status: COMPLETED | OUTPATIENT
Start: 2021-01-01 | End: 2021-01-01

## 2021-01-01 RX ORDER — CEPHALEXIN 500 MG
1 CAPSULE ORAL
Qty: 14 | Refills: 0
Start: 2021-01-01 | End: 2021-01-01

## 2021-01-01 RX ORDER — HYDROCHLOROTHIAZIDE 25 MG
25 TABLET ORAL ONCE
Refills: 0 | Status: COMPLETED | OUTPATIENT
Start: 2021-01-01 | End: 2021-01-01

## 2021-01-01 RX ORDER — ROCURONIUM BROMIDE 10 MG/ML
50 VIAL (ML) INTRAVENOUS ONCE
Refills: 0 | Status: COMPLETED | OUTPATIENT
Start: 2021-01-01 | End: 2021-01-01

## 2021-01-01 RX ORDER — REMDESIVIR 5 MG/ML
INJECTION INTRAVENOUS
Refills: 0 | Status: DISCONTINUED | OUTPATIENT
Start: 2021-01-01 | End: 2021-01-01

## 2021-01-01 RX ORDER — SODIUM CHLORIDE 9 MG/ML
1000 INJECTION INTRAMUSCULAR; INTRAVENOUS; SUBCUTANEOUS
Refills: 0 | Status: DISCONTINUED | OUTPATIENT
Start: 2021-01-01 | End: 2021-01-01

## 2021-01-01 RX ORDER — ZINC SULFATE TAB 220 MG (50 MG ZINC EQUIVALENT) 220 (50 ZN) MG
1 TAB ORAL
Qty: 0 | Refills: 0 | DISCHARGE

## 2021-01-01 RX ORDER — LACTOBACILLUS ACIDOPHILUS 100MM CELL
1 CAPSULE ORAL
Qty: 0 | Refills: 0 | DISCHARGE

## 2021-01-01 RX ORDER — CHLORHEXIDINE GLUCONATE 213 G/1000ML
15 SOLUTION TOPICAL EVERY 12 HOURS
Refills: 0 | Status: DISCONTINUED | OUTPATIENT
Start: 2021-01-01 | End: 2021-01-01

## 2021-01-01 RX ORDER — THIAMINE MONONITRATE (VIT B1) 100 MG
1 TABLET ORAL
Qty: 0 | Refills: 0 | DISCHARGE

## 2021-01-01 RX ORDER — BUDESONIDE AND FORMOTEROL FUMARATE DIHYDRATE 160; 4.5 UG/1; UG/1
2 AEROSOL RESPIRATORY (INHALATION)
Refills: 0 | Status: DISCONTINUED | OUTPATIENT
Start: 2021-01-01 | End: 2021-01-01

## 2021-01-01 RX ORDER — SODIUM CHLORIDE 9 MG/ML
1 INJECTION INTRAMUSCULAR; INTRAVENOUS; SUBCUTANEOUS THREE TIMES A DAY
Refills: 0 | Status: DISCONTINUED | OUTPATIENT
Start: 2021-01-01 | End: 2021-01-01

## 2021-01-01 RX ORDER — ACETAMINOPHEN 500 MG
1000 TABLET ORAL ONCE
Refills: 0 | Status: COMPLETED | OUTPATIENT
Start: 2021-01-01 | End: 2021-01-01

## 2021-01-01 RX ORDER — DEXAMETHASONE 0.5 MG/5ML
10 ELIXIR ORAL DAILY
Refills: 0 | Status: DISCONTINUED | OUTPATIENT
Start: 2021-01-01 | End: 2021-01-01

## 2021-01-01 RX ORDER — VASOPRESSIN 20 [USP'U]/ML
0.04 INJECTION INTRAVENOUS
Qty: 50 | Refills: 0 | Status: DISCONTINUED | OUTPATIENT
Start: 2021-01-01 | End: 2021-01-01

## 2021-01-01 RX ORDER — PHENYLEPHRINE HYDROCHLORIDE 10 MG/ML
0.2 INJECTION INTRAVENOUS
Qty: 40 | Refills: 0 | Status: DISCONTINUED | OUTPATIENT
Start: 2021-01-01 | End: 2021-01-01

## 2021-01-01 RX ADMIN — Medication 6 MILLIGRAM(S): at 04:45

## 2021-01-01 RX ADMIN — MORPHINE SULFATE 2 MILLIGRAM(S): 50 CAPSULE, EXTENDED RELEASE ORAL at 23:59

## 2021-01-01 RX ADMIN — DEXMEDETOMIDINE HYDROCHLORIDE IN 0.9% SODIUM CHLORIDE 8.8 MICROGRAM(S)/KG/HR: 4 INJECTION INTRAVENOUS at 04:11

## 2021-01-01 RX ADMIN — MORPHINE SULFATE 2 MILLIGRAM(S): 50 CAPSULE, EXTENDED RELEASE ORAL at 03:00

## 2021-01-01 RX ADMIN — DEXMEDETOMIDINE HYDROCHLORIDE IN 0.9% SODIUM CHLORIDE 8.8 MICROGRAM(S)/KG/HR: 4 INJECTION INTRAVENOUS at 00:16

## 2021-01-01 RX ADMIN — MORPHINE SULFATE 2 MILLIGRAM(S): 50 CAPSULE, EXTENDED RELEASE ORAL at 02:58

## 2021-01-01 RX ADMIN — SODIUM CHLORIDE 1000 MILLILITER(S): 9 INJECTION INTRAMUSCULAR; INTRAVENOUS; SUBCUTANEOUS at 05:25

## 2021-01-01 RX ADMIN — DEXMEDETOMIDINE HYDROCHLORIDE IN 0.9% SODIUM CHLORIDE 8.8 MICROGRAM(S)/KG/HR: 4 INJECTION INTRAVENOUS at 07:55

## 2021-01-01 RX ADMIN — MORPHINE SULFATE 2 MILLIGRAM(S): 50 CAPSULE, EXTENDED RELEASE ORAL at 19:00

## 2021-01-01 RX ADMIN — CHLORHEXIDINE GLUCONATE 1 APPLICATION(S): 213 SOLUTION TOPICAL at 05:20

## 2021-01-01 RX ADMIN — HYDROMORPHONE HYDROCHLORIDE 1 MILLIGRAM(S): 2 INJECTION INTRAMUSCULAR; INTRAVENOUS; SUBCUTANEOUS at 22:41

## 2021-01-01 RX ADMIN — Medication 10 MILLIGRAM(S): at 15:19

## 2021-01-01 RX ADMIN — SODIUM CHLORIDE 1000 MILLILITER(S): 9 INJECTION INTRAMUSCULAR; INTRAVENOUS; SUBCUTANEOUS at 15:33

## 2021-01-01 RX ADMIN — Medication 25 MILLIGRAM(S): at 02:40

## 2021-01-01 RX ADMIN — DEXMEDETOMIDINE HYDROCHLORIDE IN 0.9% SODIUM CHLORIDE 8.8 MICROGRAM(S)/KG/HR: 4 INJECTION INTRAVENOUS at 11:38

## 2021-01-01 RX ADMIN — ETOMIDATE 20 MILLIGRAM(S): 2 INJECTION INTRAVENOUS at 01:30

## 2021-01-01 RX ADMIN — MIDAZOLAM HYDROCHLORIDE 2 MILLIGRAM(S): 1 INJECTION, SOLUTION INTRAMUSCULAR; INTRAVENOUS at 07:07

## 2021-01-01 RX ADMIN — Medication 110 MILLIGRAM(S): at 18:05

## 2021-01-01 RX ADMIN — MIDAZOLAM HYDROCHLORIDE 2 MILLIGRAM(S): 1 INJECTION, SOLUTION INTRAMUSCULAR; INTRAVENOUS at 13:35

## 2021-01-01 RX ADMIN — Medication 1 MILLIGRAM(S): at 11:25

## 2021-01-01 RX ADMIN — MORPHINE SULFATE 2 MILLIGRAM(S): 50 CAPSULE, EXTENDED RELEASE ORAL at 18:19

## 2021-01-01 RX ADMIN — Medication 10 MILLIGRAM(S): at 21:26

## 2021-01-01 RX ADMIN — SODIUM CHLORIDE 1000 MILLILITER(S): 9 INJECTION INTRAMUSCULAR; INTRAVENOUS; SUBCUTANEOUS at 14:33

## 2021-01-01 RX ADMIN — Medication 1 TABLET(S): at 11:12

## 2021-01-01 RX ADMIN — SODIUM CHLORIDE 1000 MILLILITER(S): 9 INJECTION, SOLUTION INTRAVENOUS at 08:42

## 2021-01-01 RX ADMIN — DEXMEDETOMIDINE HYDROCHLORIDE IN 0.9% SODIUM CHLORIDE 8.8 MICROGRAM(S)/KG/HR: 4 INJECTION INTRAVENOUS at 12:59

## 2021-01-01 RX ADMIN — PROPOFOL 100 MILLIGRAM(S): 10 INJECTION, EMULSION INTRAVENOUS at 01:30

## 2021-01-01 RX ADMIN — SODIUM CHLORIDE 70 MILLILITER(S): 9 INJECTION INTRAMUSCULAR; INTRAVENOUS; SUBCUTANEOUS at 07:52

## 2021-01-01 RX ADMIN — Medication 110 MILLIGRAM(S): at 05:37

## 2021-01-01 RX ADMIN — MORPHINE SULFATE 2 MILLIGRAM(S): 50 CAPSULE, EXTENDED RELEASE ORAL at 15:51

## 2021-01-01 RX ADMIN — MORPHINE SULFATE 2 MILLIGRAM(S): 50 CAPSULE, EXTENDED RELEASE ORAL at 12:25

## 2021-01-01 RX ADMIN — Medication 100 MILLIGRAM(S): at 13:33

## 2021-01-01 RX ADMIN — Medication 10 MILLIGRAM(S): at 16:12

## 2021-01-01 RX ADMIN — HYDROMORPHONE HYDROCHLORIDE 1 MILLIGRAM(S): 2 INJECTION INTRAMUSCULAR; INTRAVENOUS; SUBCUTANEOUS at 05:19

## 2021-01-01 RX ADMIN — Medication 2 MILLIGRAM(S): at 13:34

## 2021-01-01 RX ADMIN — SODIUM CHLORIDE 1 GRAM(S): 9 INJECTION INTRAMUSCULAR; INTRAVENOUS; SUBCUTANEOUS at 22:02

## 2021-01-01 RX ADMIN — DEXMEDETOMIDINE HYDROCHLORIDE IN 0.9% SODIUM CHLORIDE 8.8 MICROGRAM(S)/KG/HR: 4 INJECTION INTRAVENOUS at 19:43

## 2021-01-01 RX ADMIN — PROPOFOL 10.6 MICROGRAM(S)/KG/MIN: 10 INJECTION, EMULSION INTRAVENOUS at 06:40

## 2021-01-01 RX ADMIN — DEXMEDETOMIDINE HYDROCHLORIDE IN 0.9% SODIUM CHLORIDE 8.8 MICROGRAM(S)/KG/HR: 4 INJECTION INTRAVENOUS at 09:29

## 2021-01-01 RX ADMIN — Medication 100 MILLIGRAM(S): at 11:12

## 2021-01-01 RX ADMIN — ENOXAPARIN SODIUM 40 MILLIGRAM(S): 100 INJECTION SUBCUTANEOUS at 13:00

## 2021-01-01 RX ADMIN — SODIUM CHLORIDE 1 GRAM(S): 9 INJECTION INTRAMUSCULAR; INTRAVENOUS; SUBCUTANEOUS at 13:32

## 2021-01-01 RX ADMIN — REMDESIVIR 500 MILLIGRAM(S): 5 INJECTION INTRAVENOUS at 06:00

## 2021-01-01 RX ADMIN — Medication 1 PATCH: at 12:59

## 2021-01-01 RX ADMIN — MIDAZOLAM HYDROCHLORIDE 2 MILLIGRAM(S): 1 INJECTION, SOLUTION INTRAMUSCULAR; INTRAVENOUS at 12:26

## 2021-01-01 RX ADMIN — DEXMEDETOMIDINE HYDROCHLORIDE IN 0.9% SODIUM CHLORIDE 8.8 MICROGRAM(S)/KG/HR: 4 INJECTION INTRAVENOUS at 22:20

## 2021-01-01 RX ADMIN — Medication 10 MILLIGRAM(S): at 00:50

## 2021-01-01 RX ADMIN — DEXMEDETOMIDINE HYDROCHLORIDE IN 0.9% SODIUM CHLORIDE 8.8 MICROGRAM(S)/KG/HR: 4 INJECTION INTRAVENOUS at 13:00

## 2021-01-01 RX ADMIN — MORPHINE SULFATE 2 MILLIGRAM(S): 50 CAPSULE, EXTENDED RELEASE ORAL at 06:25

## 2021-01-01 RX ADMIN — MORPHINE SULFATE 2 MILLIGRAM(S): 50 CAPSULE, EXTENDED RELEASE ORAL at 19:43

## 2021-01-01 RX ADMIN — MORPHINE SULFATE 2 MILLIGRAM(S): 50 CAPSULE, EXTENDED RELEASE ORAL at 20:37

## 2021-01-01 RX ADMIN — PROPOFOL 10.6 MICROGRAM(S)/KG/MIN: 10 INJECTION, EMULSION INTRAVENOUS at 01:45

## 2021-01-01 RX ADMIN — PANTOPRAZOLE SODIUM 40 MILLIGRAM(S): 20 TABLET, DELAYED RELEASE ORAL at 12:38

## 2021-01-01 RX ADMIN — Medication 1 TABLET(S): at 11:25

## 2021-01-01 RX ADMIN — Medication 4.13 MICROGRAM(S)/KG/MIN: at 05:04

## 2021-01-01 RX ADMIN — DEXMEDETOMIDINE HYDROCHLORIDE IN 0.9% SODIUM CHLORIDE 8.8 MICROGRAM(S)/KG/HR: 4 INJECTION INTRAVENOUS at 15:20

## 2021-01-01 RX ADMIN — SODIUM CHLORIDE 1 GRAM(S): 9 INJECTION INTRAMUSCULAR; INTRAVENOUS; SUBCUTANEOUS at 07:52

## 2021-01-01 RX ADMIN — Medication 75 MEQ/KG/HR: at 12:47

## 2021-01-01 RX ADMIN — DEXMEDETOMIDINE HYDROCHLORIDE IN 0.9% SODIUM CHLORIDE 8.8 MICROGRAM(S)/KG/HR: 4 INJECTION INTRAVENOUS at 16:00

## 2021-01-01 RX ADMIN — Medication 10 MILLIGRAM(S): at 00:16

## 2021-01-01 RX ADMIN — SODIUM CHLORIDE 1 GRAM(S): 9 INJECTION INTRAMUSCULAR; INTRAVENOUS; SUBCUTANEOUS at 05:18

## 2021-01-01 RX ADMIN — HYDROMORPHONE HYDROCHLORIDE 2 MILLIGRAM(S): 2 INJECTION INTRAMUSCULAR; INTRAVENOUS; SUBCUTANEOUS at 13:34

## 2021-01-01 RX ADMIN — SODIUM CHLORIDE 75 MILLILITER(S): 9 INJECTION, SOLUTION INTRAVENOUS at 05:05

## 2021-01-01 RX ADMIN — CHLORHEXIDINE GLUCONATE 15 MILLILITER(S): 213 SOLUTION TOPICAL at 05:37

## 2021-01-01 RX ADMIN — DEXMEDETOMIDINE HYDROCHLORIDE IN 0.9% SODIUM CHLORIDE 8.8 MICROGRAM(S)/KG/HR: 4 INJECTION INTRAVENOUS at 22:54

## 2021-01-01 RX ADMIN — Medication 10 MILLIGRAM(S): at 10:16

## 2021-01-01 RX ADMIN — DEXMEDETOMIDINE HYDROCHLORIDE IN 0.9% SODIUM CHLORIDE 8.8 MICROGRAM(S)/KG/HR: 4 INJECTION INTRAVENOUS at 18:19

## 2021-01-01 RX ADMIN — Medication 100 MILLIGRAM(S): at 05:18

## 2021-01-01 RX ADMIN — SODIUM CHLORIDE 1000 MILLILITER(S): 9 INJECTION INTRAMUSCULAR; INTRAVENOUS; SUBCUTANEOUS at 03:48

## 2021-01-01 RX ADMIN — VASOPRESSIN 2.4 UNIT(S)/MIN: 20 INJECTION INTRAVENOUS at 09:43

## 2021-01-01 RX ADMIN — Medication 100 MILLIGRAM(S): at 12:38

## 2021-01-01 RX ADMIN — Medication 110 MILLIGRAM(S): at 07:55

## 2021-01-01 RX ADMIN — ENOXAPARIN SODIUM 40 MILLIGRAM(S): 100 INJECTION SUBCUTANEOUS at 12:38

## 2021-01-01 RX ADMIN — Medication 1 MILLIGRAM(S): at 11:12

## 2021-01-01 RX ADMIN — DEXMEDETOMIDINE HYDROCHLORIDE IN 0.9% SODIUM CHLORIDE 8.8 MICROGRAM(S)/KG/HR: 4 INJECTION INTRAVENOUS at 19:10

## 2021-01-01 RX ADMIN — BUDESONIDE AND FORMOTEROL FUMARATE DIHYDRATE 2 PUFF(S): 160; 4.5 AEROSOL RESPIRATORY (INHALATION) at 10:39

## 2021-01-01 RX ADMIN — MORPHINE SULFATE 2 MILLIGRAM(S): 50 CAPSULE, EXTENDED RELEASE ORAL at 22:54

## 2021-01-01 RX ADMIN — Medication 50 MILLIGRAM(S): at 05:19

## 2021-01-01 RX ADMIN — Medication 5 MILLIGRAM(S): at 11:31

## 2021-01-01 RX ADMIN — Medication 100 MILLIGRAM(S): at 11:25

## 2021-01-01 RX ADMIN — Medication 400 MILLIGRAM(S): at 04:45

## 2021-01-01 RX ADMIN — REMDESIVIR 500 MILLIGRAM(S): 5 INJECTION INTRAVENOUS at 07:59

## 2021-01-01 RX ADMIN — SODIUM CHLORIDE 1 GRAM(S): 9 INJECTION INTRAMUSCULAR; INTRAVENOUS; SUBCUTANEOUS at 14:12

## 2021-01-01 RX ADMIN — CHLORHEXIDINE GLUCONATE 1 APPLICATION(S): 213 SOLUTION TOPICAL at 18:08

## 2021-01-01 RX ADMIN — Medication 102 MILLIGRAM(S): at 07:07

## 2021-01-01 RX ADMIN — Medication 100 MILLIGRAM(S): at 15:14

## 2021-01-01 RX ADMIN — MORPHINE SULFATE 2 MILLIGRAM(S): 50 CAPSULE, EXTENDED RELEASE ORAL at 11:37

## 2021-01-01 RX ADMIN — MORPHINE SULFATE 2 MILLIGRAM(S): 50 CAPSULE, EXTENDED RELEASE ORAL at 06:55

## 2021-01-01 RX ADMIN — DEXMEDETOMIDINE HYDROCHLORIDE IN 0.9% SODIUM CHLORIDE 8.8 MICROGRAM(S)/KG/HR: 4 INJECTION INTRAVENOUS at 21:00

## 2021-01-01 RX ADMIN — Medication 10 MILLIGRAM(S): at 05:55

## 2021-01-14 NOTE — ED STATDOCS - NSFOLLOWUPINSTRUCTIONS_ED_ALL_ED_FT
- You have a urinary tract infection  - take antibiotics as directed, do not stop until all doses completed  Make a follow up appointment with your doctor for 2-3 days. Bring all papers from here with you

## 2021-01-14 NOTE — ED STATDOCS - OBJECTIVE STATEMENT
71 y/o male with PMHx of HTN presents to ED c/o dehydration. Patient reports he is dehydrated, he was told by the clinic he was dehydrated after having lab work done. Patient does not have a copy of the previous lab results with him today.     Denies CP, N/V/D, fevers, back pain

## 2021-01-14 NOTE — ED STATDOCS - PROGRESS NOTE DETAILS
NP NOTE:  Charting and results reviewed.  Na+ 127, got 1L NS, CXR no acute abnormalities. + UTI will treat with abx.  f/u [facility] 2-3 days.

## 2021-01-14 NOTE — ED STATDOCS - PATIENT PORTAL LINK FT
You can access the FollowMyHealth Patient Portal offered by [facility] by registering at the following website: [email or web address]. By joining [employer]’s FollowMyHealth portal, you will also be able to view your health information using other applications (apps) compatible with our system.

## 2021-01-14 NOTE — ED ADULT NURSE NOTE - OBJECTIVE STATEMENT
pt awake, alert and oriented x3 offering no complaints.  pt states he went to his PCP for routine check up and was told to come to ED for dehydration.

## 2021-01-14 NOTE — ED STATDOCS - ATTENDING CONTRIBUTION TO CARE
I, Lucy Tate, performed the initial face to face bedside interview with this patient regarding history of present illness, review of symptoms and relevant past medical, social and family history.  I completed an independent physical examination.  I was the initial provider who evaluated this patient. I have signed out the follow up of any pending tests (i.e. labs, radiological studies) to the ACP.  I have communicated the patient’s plan of care and disposition with the ACP.  The history, relevant review of systems, past medical and surgical history, medical decision making, and physical examination was documented by the scribe in my presence and I attest to the accuracy of the documentation.

## 2021-01-14 NOTE — ED ADULT TRIAGE NOTE - CHIEF COMPLAINT QUOTE
pt states had blood drawn at clinic 3 days ago and was called today and told he was dehydrated. pt offers no sx's. does not recall lab values

## 2021-09-17 NOTE — ED PROVIDER NOTE - OBJECTIVE STATEMENT
HPI:  70yoM; with PMH signif for ???; now p/w medical evaluation. Patient was brought to the ER after his sister-in-law called EMS because he defecated on to the floor. Patient does not recall anything happening. Denies allergies  Patient admits to drinking 2 beers   PMH:  SOCIAL: +social EtOH use, denies tobacco/illicit drug use, denies smoking HPI:  70yoM; with PMH signif for HTN; now p/w medical evaluation. Patient was brought to the ER after his sister-in-law called EMS because he defecated on to the floor. Patient does not recall anything happening. Denies allergies. denies cp/sob/palp. denies abd pain. denies n/v. denies headache. denies numbness/tingling. denies focal weakness. Patient admits to drinking 2 beers daily, but denies any more substance abuse.   PMH: HTN  SOCIAL: +social EtOH use, denies tobacco/illicit drug use, denies smoking

## 2021-09-17 NOTE — ED ADULT TRIAGE NOTE - ARRIVAL FROM
Home [place] Pregnancy And Lactation Text: This medication is Pregnancy Category X and should not be given to women who are pregnant or may become pregnant. This medication should not be used if you are breast feeding.

## 2021-09-17 NOTE — ED PROVIDER NOTE - CLINICAL SUMMARY MEDICAL DECISION MAKING FREE TEXT BOX
?Syncope vs intox. will get labs, ct and re evaluation ?Syncope vs intox:  will get labs, ct and re evaluation ?Syncope vs intox:  will get labs, ct and re evaluation  clinical summary: labs revealed intoxication and hyponatremia, will place in obs for hydration and re-eval.

## 2021-09-17 NOTE — ED ADULT TRIAGE NOTE - CHIEF COMPLAINT QUOTE
Pt arrives from home where he lives alone and drinks beer daily. Pt reports drinking beer today and states he doesn't know why he is here. EMS states pt sister called due to pt unable to live on his own. pt is noted to be incontinent of feces and urine and appears unkept and emaciated.

## 2021-09-17 NOTE — ED PROVIDER NOTE - PHYSICAL EXAMINATION
Gen: Alert, NAD  Head: NC, AT, PERRL, EOMI, normal lids/conjunctiva  ENT: B TM WNL, normal hearing, patent oropharynx without erythema/exudate, uvula midline  Neck: +supple, no tenderness/meningismus/JVD, +Trachea midline  Pulm: Bilateral BS, normal resp effort, no wheeze/stridor/retractions  CV: RRR, no M/R/G, 2+dist pulses  Abd: soft, NT/ND, +BS, no hepatosplenomegaly  Mskel: ROM intact x4 extremities.  no edema/erythema/cyanosis  Skin: no rash, warm, dry  Neuro: AAOx3, no sensory/motor deficits, CN 2-12 intact Gen: Alert, NAD  Head: NC, AT, PERRL, EOMI, normal lids/conjunctiva  Neck: +supple, no tenderness/meningismus/JVD, +Trachea midline  Pulm: Bilateral BS, normal resp effort, no wheeze/stridor/retractions  CV: RRR, no M/R/G, 2+dist pulses  Abd: soft, NT/ND, +BS, no hepatosplenomegaly  Mskel: ROM intact x4 extremities.  no edema/erythema/cyanosis  Skin: no rash, warm, dry  Neuro: AAOx3, no sensory/motor deficits, CN 2-12 intact

## 2021-09-18 NOTE — ED CDU PROVIDER INITIAL DAY NOTE - PROGRESS NOTE DETAILS
PA NOTE: Improvement in Na s/p IVF. Pt scoring 0 on CIWA since CDU placement. Pt evaluated by PT who recommended PAULA vs home with assist. Case discussed with pts sister who states home assistance is unavailable. CM made aware. SW involved to assist in placement / safe discharge. Notified by RN for PT with elevated /122. Pt evaluated at bedside and endorses no complaints. Denies chest pain, SOB, back pain, nausea, vomiting, dizziness. Pt missed yesterdays metoprolol dosing and received his todays dose late. Will place on monitor and give labetalol 10 IVP x 1. Will continue to monitor BP and consider medicine / cards consult if BP remains elevated.

## 2021-09-18 NOTE — ED ADULT NURSE REASSESSMENT NOTE - NS ED NURSE REASSESS COMMENT FT1
Assumed care of the patient at 1545. Verbal report received from [facility] ED. Patient transferred to observation unit CDU 8. Patient A&Ox4, dressed in yellow gown. Patient in NAD. Denies any pain or discomfort. IVF infusing as per orders. B/L LE edema+, per pt chronic. Patient pending SW for dispo. Patient in understanding of plan of care. Patient with no further questions for the RN. Resting in comfort. Call bell within reach and encouraged to use when assistance needed. Will continue to monitor. Assumed care of the patient at 1545. Verbal report received from [facility] ED. Patient transferred to observation unit CDU 8. Patient A&Ox4, dressed in yellow gown. Patient in NAD. Denies any pain or discomfort. IVF infusing as per orders. B/L LE edema+, per pt chronic. Patient noted with elevated BP, JOSE DANIEL Sandra at the bedside, ordered received for IV labetalol, will give and re-eval. Patient denies any CP/SOB or dizziness. Patient pending SW for dispo. Patient in understanding of plan of care. Patient with no further questions for the RN. Resting in comfort. Call bell within reach and encouraged to use when assistance needed. Will continue to monitor.

## 2021-09-18 NOTE — ED CDU PROVIDER INITIAL DAY NOTE - OBJECTIVE STATEMENT
HPI:  70yoM; with PMH signif for HTN; now p/w medical evaluation. Patient was brought to the ER after his sister-in-law called EMS because he defecated on to the floor. Patient does not recall anything happening. Denies allergies. denies cp/sob/palp. denies abd pain. denies n/v. denies headache. denies numbness/tingling. denies focal weakness. Patient admits to drinking 2 beers daily, but denies any more substance abuse.   PMH: HTN  SOCIAL: +social EtOH use, denies tobacco/illicit drug use, denies smoking

## 2021-09-18 NOTE — PHYSICAL THERAPY INITIAL EVALUATION ADULT - PHYSICAL ASSIST/NONPHYSICAL ASSIST: SIT/SUPINE, REHAB EVAL
required increased assistance  to help get bilateral LE's into bed/assume proper semi-velasco position + verbal cues for proper hand placement./1 person assist

## 2021-09-18 NOTE — ED ADULT NURSE REASSESSMENT NOTE - NS ED NURSE REASSESS COMMENT FT1
assumed pt care at 0730.  pt resting in bed with no signs of distress.  NS infusing as ordered.  pt updated on plan of care. Will continue to monitor and reassess.

## 2021-09-18 NOTE — ED ADULT NURSE REASSESSMENT NOTE - NS ED NURSE REASSESS COMMENT FT1
anticipating discharge, pts belongings returned and instructed patient to get dressed.  upon standing up, pt very unsteady and unable to take steps.  JOSE DANIEL Skaggs aware, will reeval.

## 2021-09-18 NOTE — PHYSICAL THERAPY INITIAL EVALUATION ADULT - GAIT DEVIATIONS NOTED, PT EVAL
stepping too far from Assistive Device/decreased ladi/decreased step length/decreased stride length/decreased weight-shifting ability

## 2021-09-18 NOTE — PHYSICAL THERAPY INITIAL EVALUATION ADULT - PHYSICAL ASSIST/NONPHYSICAL ASSIST: STAIR NEGOTIATION, REHAB EVAL
required increased assistance to help maintain proper posture + verbal cues for proper sequening and use of handrails. pt attempted to demonstrated to treating PT how he brings RW upstairs, deemed unsafe to perform and RW was removed from training/1 person assist

## 2021-09-18 NOTE — ED CDU PROVIDER INITIAL DAY NOTE - MEDICAL DECISION MAKING DETAILS
1. Hyponatremia: ivf, salt tabs, re-eval in am, labs to investigate hyponatremia  2. Alcohol abuse: sbirt in am, ciwa q4hrs  3. Unable to care for self at home, as per family: sw to eval for home care.

## 2021-09-18 NOTE — PHYSICAL THERAPY INITIAL EVALUATION ADULT - DISCHARGE DISPOSITION, PT EVAL
PAULA vs home with 24/7 assist, RW and home PT pending progress, pending confirmation of family's ability/willingness to provide assistance at home and pending continued stair training, as pt is an increased falls risk and has multiple stairs to negotiate at home, deeming pt unsafe to return home at this time.

## 2021-09-18 NOTE — PHYSICAL THERAPY INITIAL EVALUATION ADULT - PERTINENT HX OF CURRENT PROBLEM, REHAB EVAL
as per EMR, pt was brought into ED 2/2 pt unable to live alone(Christopher ALFRED confirmed that family is unable to care/provide adequate assistance at home). pt was found on the floor by family incontinent of urine and feces. all scans did not reveal injury/pathology.

## 2021-09-18 NOTE — ED ADULT NURSE NOTE - NSIMPLEMENTINTERV_GEN_ALL_ED
Implemented All Fall Risk Interventions:  [place] to call system. Call bell, personal items and telephone within reach. Instruct patient to call for assistance. Room bathroom lighting operational. Non-slip footwear when patient is off stretcher. Physically safe environment: no spills, clutter or unnecessary equipment. Stretcher in lowest position, wheels locked, appropriate side rails in place. Provide visual cue, wrist band, yellow gown, etc. Monitor gait and stability. Monitor for mental status changes and reorient to person, place, and time. Review medications for side effects contributing to fall risk. Reinforce activity limits and safety measures with patient and family.

## 2021-09-18 NOTE — PHYSICAL THERAPY INITIAL EVALUATION ADULT - GENERAL OBSERVATIONS, REHAB EVAL
Pt received in ED, pt ok for PT by Christopher ALFRED. pt observed semi-velasco in stretcher with IV lock, pleasant, cooperative, A&O(person, place and year only) and c/o 0/10 pain t/o eval.

## 2021-09-18 NOTE — ED CDU PROVIDER INITIAL DAY NOTE - PHYSICAL EXAMINATION
Gen: Alert, NAD  Head: NC, AT, PERRL, EOMI, normal lids/conjunctiva  Neck: +supple, no tenderness/meningismus/JVD, +Trachea midline  Pulm: Bilateral BS, normal resp effort, no wheeze/stridor/retractions  CV: RRR, no M/R/G, 2+dist pulses  Abd: soft, NT/ND, +BS, no hepatosplenomegaly  Mskel: ROM intact x4 extremities.  no edema/erythema/cyanosis  Skin: no rash, warm, dry  Neuro: AAOx3, no sensory/motor deficits, CN 2-12 intact

## 2021-09-18 NOTE — PHYSICAL THERAPY INITIAL EVALUATION ADULT - ADDITIONAL COMMENTS
As per pt, pt lives with brother and sister in law in a private home with 4 JOSE 2 handrails + 8 steps 2 handrails to bed&bath, needs can be met on the ground floor if needed. Pt's PLOF was independent in all ADLs/ambulation with RW. as per pt, pt was able to carry RW up/down stairs, stating "it's light". Pt has RW, SAC, WC, commode and shower chair DME at home.

## 2021-09-19 NOTE — ED ADULT NURSE REASSESSMENT NOTE - NS ED NURSE REASSESS COMMENT FT1
Assumed care of the patient at 0730. Verbal report received from Edwardo RN ED. Patient A&Ox4. Dressed in yellow gown. CIWA 1. Patient admits drinking two beers three times a week. PIV patent. Denies any pain or discomfort. Patient pending PT eval and SW consult. Patient in understanding of plan of care. Patient with no further questions for the RN. Resting in comfort. Call bell within reach and encouraged to use when assistance needed. Will continue to monitor.

## 2021-09-19 NOTE — ED ADULT NURSE REASSESSMENT NOTE - STATUS
Pending transportation to [facility]/awaiting consult
PT eval/awaiting consult
PT eval/awaiting consult
Pt eval, SW consult/awaiting consult

## 2021-09-19 NOTE — ED ADULT NURSE REASSESSMENT NOTE - COMFORT CARE
Incontinent care provided./meal provided
meal provided/plan of care explained/po fluids offered/repositioned/wait time explained

## 2021-09-19 NOTE — CHART NOTE - NSCHARTNOTEFT_GEN_A_CORE
Social work note: Plan for pt is PAULA. Worker  met with pt who is in agreement to PAULA. Reports he uses a walker to ambulate at home. Pt is medically stable for d/c - open to any facility. Worker sent out referral and is awaiting for an accepting bed.

## 2021-09-19 NOTE — CONSULT NOTE ADULT - SUBJECTIVE AND OBJECTIVE BOX
Hospital Course: 71 yo male, pmh of HTN, remote hx of etoh intoxication during 2019 admission, who presented via EMS to [facility] ED for medical evaluation. Patient is poor historian and does not recall events leading to his recent hospital arrival. Per EMS run, ems was called to house and found patient seated in sofa stating he felt fine. 2 Family members present in the house concerned that patient was urinating on himself and insisting that patient be transported to ED. EMS noted patient to have been drinking beers during encounter, noted to be unkempt. In ED, noted to have elevated serum alcohol level, admitted to ED Observation unit yesterday to await detox. Noted during stay to be unsteady on feet, PT eval performed and recommendation for PAULA vs Home PT. Noted to have elevated BP during CDU stay. Medicine Service Consulted for management of Uncontrolled Blood Pressure.     Patient states he feels fine. Says that he drinks 1-2 beers once a week, says last drink over a week ago. Not sure why he is here in ED. Says that he only takes metoprolol 100mg for his Blood pressure. Does not remember name of his PCP. He denies any headaches. blurry vision, fevers, chills, chest pain, palpitations, cough, dyspnea, abdominal pain, dysuria, hematuria, bloody stools, numbness, weakness.    ROS: as documented above.     PAST MEDICAL & SURGICAL HISTORY:  Hypertension  H/O exploratory laparotomy    Social Hx: Ex smoker, quit 30 yrs ago.   Endorses beer consumption, patient states     FAMILY HISTORY:  Brother  of Colon Cancer     Allergies  No Known Allergies      MEDICATIONS  (STANDING):  folic acid 1 milliGRAM(s) Oral daily  metoprolol succinate  milliGRAM(s) Oral daily  multivitamin 1 Tablet(s) Oral daily  sodium chloride 1 Gram(s) Oral three times a day  sodium chloride 0.9%. 1000 milliLiter(s) (70 mL/Hr) IV Continuous <Continuous>  thiamine 100 milliGRAM(s) Oral daily      Physical Exam:  Vital Signs Last 24 Hrs  T(C): 36.7 (19 Sep 2021 00:44), Max: 37.8 (18 Sep 2021 19:39)  T(F): 98 (19 Sep 2021 00:44), Max: 100 (18 Sep 2021 19:39)  HR: 85 (19 Sep 2021 00:44) (67 - 97)  BP: 199/110 (19 Sep 2021 00:44) (156/88 - 216/122)  BP(mean): --  RR: 19 (19 Sep 2021 00:44) (16 - 19)  SpO2: 98% (19 Sep 2021 00:44) (95% - 98%)    GEN: NAD, unkempt in yellow gown, sitting in ED stretcher   HEENT: normocephalic and atraumatic. EOMI. PERRL.    Mouth: moist oral mucosa   PULM: Clear to auscultation.  CVS: Regular rate and rhythm without murmur.  GI: Soft, nontender, and nondistended.  Positive bowel sounds.    : No CVA tenderness  VASCULAR: 1+ pitting b/l pedal edema. No calf tenderness.   MSK: No joint swelling  NEUROLOGIC: Cranial nerves II through XII are grossly intact.  PSYCHIATRIC: AAO x self, place, time, but not to situation.   SKIN: No ulceration or induration present.        Labs:                        11.5   2.63  )-----------( 120      ( 17 Sep 2021 22:47 )             32.3     -    132<L>  |  92<L>  |  4.2<L>  ----------------------------<  83  3.9   |  23.0  |  0.63    Ca    8.3<L>      18 Sep 2021 10:45    TPro  7.3  /  Alb  3.5  /  TBili  0.5  /  DBili  x   /  AST  115<H>  /  ALT  46<H>  /  AlkPhos  113           Hospital Course: 69 yo male, pmh of HTN, remote hx of etoh intoxication during 2019 admission, who presented via EMS to [facility] ED for medical evaluation. Patient is poor historian and does not recall events leading to his recent hospital arrival. Per EMS run, ems was called to house and found patient seated in sofa stating he felt fine. 2 Family members present in the house concerned that patient was urinating on himself and insisting that patient be transported to ED. EMS noted patient to have been drinking beers during encounter, noted to be unkempt. In ED, noted to have elevated serum alcohol level, admitted to ED Observation unit yesterday to await detox. Noted during stay to be unsteady on feet, PT eval performed and recommendation for PAULA vs Home PT. Noted to have elevated BP during CDU stay. Medicine Service Consulted for management of Uncontrolled Blood Pressure.     Patient states he feels fine. Says that he drinks 1-2 beers once a week, says last drink over a week ago. Not sure why he is here in ED. Says that he only takes metoprolol 100mg for his Blood pressure. Does not remember name of his PCP. He denies any headaches. blurry vision, fevers, chills, chest pain, palpitations, cough, dyspnea, abdominal pain, dysuria, hematuria, bloody stools, numbness, weakness.    ROS: as documented above.     PAST MEDICAL & SURGICAL HISTORY:  Hypertension  H/O exploratory laparotomy    Social Hx: Ex smoker, quit 30 yrs ago.   Endorses beer consumption, patient states     FAMILY HISTORY:  Brother  of Colon Cancer     Allergies  No Known Allergies      MEDICATIONS  (STANDING):  folic acid 1 milliGRAM(s) Oral daily  metoprolol succinate  milliGRAM(s) Oral daily  multivitamin 1 Tablet(s) Oral daily  sodium chloride 1 Gram(s) Oral three times a day  sodium chloride 0.9%. 1000 milliLiter(s) (70 mL/Hr) IV Continuous <Continuous>  thiamine 100 milliGRAM(s) Oral daily      Physical Exam:  Vital Signs Last 24 Hrs  T(C): 36.7 (19 Sep 2021 00:44), Max: 37.8 (18 Sep 2021 19:39)  T(F): 98 (19 Sep 2021 00:44), Max: 100 (18 Sep 2021 19:39)  HR: 85 (19 Sep 2021 00:44) (67 - 97)  BP: 199/110 (19 Sep 2021 00:44) (156/88 - 216/122)  BP(mean): --  RR: 19 (19 Sep 2021 00:44) (16 - 19)  SpO2: 98% (19 Sep 2021 00:44) (95% - 98%)    GEN: NAD, unkempt in yellow gown, sitting in ED stretcher   HEENT: normocephalic and atraumatic. EOMI. PERRL.    Mouth: moist oral mucosa   PULM: Clear to auscultation.  CVS: Regular rate and rhythm without murmur.  GI: Soft, nontender, and nondistended.  Positive bowel sounds.    : No CVA tenderness  VASCULAR: 1+ pitting b/l pedal edema. No calf tenderness.   MSK: No joint swelling  NEUROLOGIC: Cranial nerves II through XII are grossly intact.  PSYCHIATRIC: AAO x self, place, time, but not to situation.   SKIN: No ulceration or induration present.        Labs:                        11.5   2.63  )-----------( 120      ( 17 Sep 2021 22:47 )             32.3         132<L>  |  92<L>  |  4.2<L>  ----------------------------<  83  3.9   |  23.0  |  0.63    Ca    8.3<L>      18 Sep 2021 10:45    TPro  7.3  /  Alb  3.5  /  TBili  0.5  /  DBili  x   /  AST  115<H>  /  ALT  46<H>  /  AlkPhos  113      Alcohol, Blood (21 @ 22:47)    Alcohol, Blood: 278: TOXIC CONCENTRATIONS (mg/dL):  Flushing, Slowing of  Reflexes, Impaired Visual Acuity:     Depression of CNS:    > 100  Fatalities Reported:       > 400  Results reported as a "< number" are below reliably detectable limits and  considered negative  These ranges are intended as general guidelines.  Alcohol metabolism can vary widely among individuals.  This test  is approved for clinical and not for forensic purposes. mg/dL

## 2021-09-19 NOTE — CONSULT NOTE ADULT - PROBLEM SELECTOR RECOMMENDATION 9
medication recon performed with CVS  patient on prescribed Toprol XL 100mg daily.   currently on this in ED, still above goal, given labetalol 10mg IVP x1   recommend adding HCTZ 25mg po daily, 1st dose to be given now.   avoid norvasc as can worsen peripheral edema   can continue to uptitrate HCTZ based on BP trend.   monitor electrolytes, patient mildly hypochloremic hyponatremic but currently receiving sodium tabs, suspect component of beer potomania.  Remainder of care per primary team medication recon performed with CVS  patient on prescribed Toprol XL 100mg daily.   currently on this in ED, still above goal, given labetalol 10mg IVP x1   recommend adding HCTZ 25mg po daily, 1st dose to be given now.   recommend labetalol 10mg q6h ivp if SBP >180 or DBP>110  avoid norvasc as can worsen peripheral edema   can continue to uptitrate HCTZ based on BP trend.   monitor electrolytes, patient mildly hypochloremic hyponatremic but currently receiving sodium tabs, suspect component of beer potomania.  Remainder of care per primary team medication recon performed with CVS  patient on prescribed Toprol XL 100mg daily.   currently on this in ED, still above goal, given labetalol 10mg IVP x1   recommend adding HCTZ 25mg po daily, 1st dose to be given now.   recommend labetalol 10mg q6h ivp if SBP >180 or DBP>110  recommend discontinue IV fluid at this time to avoid further overload.   avoid norvasc as can worsen peripheral edema   can continue to uptitrate HCTZ based on BP trend.   monitor electrolytes, patient mildly hypochloremic hyponatremic but currently receiving sodium tabs, suspect component of beer potomania.  Remainder of care per primary team

## 2021-09-19 NOTE — ED CDU PROVIDER DISPOSITION NOTE - PATIENT PORTAL LINK FT
You can access the FollowMyHealth Patient Portal offered by [facility] by registering at the following website: [email or web address]. By joining [facility] [facility]’s FollowMyHealth portal, you will also be able to view your health information using other applications (apps) compatible with our system.

## 2021-09-19 NOTE — ED CDU PROVIDER DISPOSITION NOTE - CLINICAL COURSE
Pt is a 70y M with PMH of HTN placed in obs for PT and PAULA placement due to being unable to care for self. Pt was initially intoxicated and also found to be hyponatremic. electrolytes replated. Pt SW pt now has bed at [facility]. Bib rey.

## 2021-09-19 NOTE — ED CDU PROVIDER SUBSEQUENT DAY NOTE - PHYSICAL EXAMINATION
Gen: Alert, NAD  Head: NC, AT, PERRL, EOMI, normal lids/conjunctiva  Neck: +supple, no tenderness/meningismus/JVD, +Trachea midline  Pulm: Bilateral BS, normal resp effort, no wheeze/stridor/retractions  CV: RRR, no M/R/G, 2+dist pulses  Abd: soft, NT/ND, +BS, no hepatosplenomegaly  Mskel: ROM intact x4 extremities.  erythema/cyanosis mild LE edema   Skin: no rash, warm, dry  Neuro: AAOx3, no sensory/motor deficits, CN 2-12 intact

## 2021-09-19 NOTE — ED CDU PROVIDER SUBSEQUENT DAY NOTE - ATTENDING CONTRIBUTION TO CARE
Pt. awake and alert. Pt. states that he is feeling fine. Pt. waiting for CM for possible PAULA. I, Dr. Harrison, performed a face to face bedside interview with this patient regarding history of present illness, review of symptoms and relevant past medical, social and family history.  I completed an independent physical examination.  I have also reviewed the ACP's note(s) and discussed the plan with the ACP. (0) independent

## 2021-09-19 NOTE — ED CDU PROVIDER SUBSEQUENT DAY NOTE - HISTORY
Pt resting comfortably overnight, did admits to LE pain. BP rising overnight, consulted medicine for uncontrolled HTN, they recommended HCTZ 25 and IVBP labetalol if needed and to d/c fluids. Fluids dced. Pt pending PAULA and CM this morning

## 2021-09-19 NOTE — CONSULT NOTE ADULT - ASSESSMENT
71 yo male, pmh of HTN, remote hx of etoh intoxication during 2019 admission, who presented via EMS to [facility] ED for medical evaluation, found to have ETOH intoxication for which patient admitted to OBS unit for detox, found to have gait instability with PT recommendation for PAULA vs home with home PT dispo. Stay complicated by uncontrolled blood pressure for which Hospitalist Service Consulted to assist with BP management.

## 2021-09-19 NOTE — ED ADULT NURSE REASSESSMENT NOTE - NS ED NURSE REASSESS COMMENT FT1
PA Disimmonjim notified regarding BP, patient asymptomatic. No further orders received at this time.

## 2021-09-19 NOTE — ED CDU PROVIDER DISPOSITION NOTE - ATTENDING CONTRIBUTION TO CARE
Pt. awake and alert. Pt. will be transferred to [facility]. Pt. stable for discharge. I, Dr. Harrison, performed a face to face bedside interview with this patient regarding history of present illness, review of symptoms and relevant past medical, social and family history.  I completed an independent physical examination.  I have also reviewed the ACP's note(s) and discussed the plan with the ACP.

## 2021-10-16 NOTE — H&P ADULT - NSHPPHYSICALEXAM_GEN_ALL_CORE
ICU Vital Signs Last 24 Hrs  T(C): 35.1 (16 Oct 2021 16:24), Max: 37.7 (16 Oct 2021 05:52)  T(F): 95.1 (16 Oct 2021 16:24), Max: 99.8 (16 Oct 2021 05:52)  HR: 105 (16 Oct 2021 14:50) (59 - 124)  BP: 177/90 (16 Oct 2021 14:50) (123/75 - 177/90)  BP(mean): 110 (16 Oct 2021 14:50) (110 - 110)  ABP: --  ABP(mean): --  RR: 29 (16 Oct 2021 14:50) (25 - 36)  SpO2: 90% (16 Oct 2021 14:50) (64% - 100%)  General: resp distress, acc muscles used  Neuro: AAO*3, No motor, sensory, or cranial nerve deficit  HEENT: Pupils equal, reactive to light, Oral mucosa dry   PULM: Clear to auscultation bilaterally  CVS: Regular rhythm and increased rate  ABD: Soft, nondistended, nontender, normoactive bowel sounds, no CVA tenderness  EXT: No b/l LE edema, nontender with pedal pulse palpable   SKIN: Warm and well perfused, no acute rashes

## 2021-10-16 NOTE — PROGRESS NOTE ADULT - SUBJECTIVE AND OBJECTIVE BOX
Patient is a 70y old  Male who presents with a chief complaint of covid (16 Oct 2021 05:44)      BRIEF HOSPITAL COURSE:   70M with PMHx EtOH abuse, HTN who presented from NH admitted with COVID-19 PNA. Complicated with ARDS/hypoxemic respiratory failure requiring institution of NIPPV, worsening agitation/confusion requiring placement on precedex for compliance. Admitted to ICU.     Events last 24 hours: now elevated BP 190s, afebrile, on NIPPV sats 91 on 70%, calm on precedex.    PAST MEDICAL & SURGICAL HISTORY:  Hypertension    H/O exploratory laparotomy        Hosp day #1      Vital signs / Reviewed and Physical Exam Performed where pertinent and urgently required    Lab / Radiology  studies / ABG / Meds -  reviewed and interpreted into the assessment and treatment plan.      Impression:  1. acute hypoxemic respiratory failure  2. ARDS  3. COVID-19 Viral PNA  4. essential HTN  5. agitation/AMS  6. KAYLEE    Plan:  Neuro - Sedation with precedex for comfort and facilitate compliance with NIPPV    CV -  HTN, add hydralazine PRN for SBP>150          pt with SB 50s will hold on BB for now          H/P states pt on apixiban however no reported outpt documentation in records or prior notation          CT chest without PE, hold AC for now    Pulm -  cont NIPPV support, adjusted IPAP to 12 from 10 and EPAP up to 8 for alveolar recruitment             sats improved to 95%, actively titrating FiO2 to achieve sats>88%             pt remains high risk for decompensation and escalation to intubation             add symbicort    GI -  NPO for now while NIPPV given high risk of aspiration    Renal - Cr elevated, avoid MAP<65, renally adjust all meds, avoid nephrotoxins, strict I/Os, trend BMP     Heme -  Pharmacologic DVT PPx  in addition to SCD's, no signs of active bleeding    ID - cont high dose steroids, Remdesivir with close obs of GFR if worsens will d/c, afebrile, WBC normal, no indication for abx therapy at this time.    Endo -  Aggressive glycemic control to limit FS glucose to < 180mg/dl, BS stable, check A1c.      COVID 19 specific considerations and therapeutic  options based on the available and rapidly changing literature        35 mins of critical care time spent in the management of this critically ill COVID-19 patient with continuous assessments and interventions based on the interpretation of multiple databases.

## 2021-10-16 NOTE — ED PROVIDER NOTE - PROGRESS NOTE DETAILS
Patient seen by MICU- switched patient from BIPAP to HFNC. Patient reassessed, vital signs improved, maintaining sat at 98%. CTA neg for PE- shows covid pneumonia. Patient to be admitted to stepdown. Solange Francois DO

## 2021-10-16 NOTE — CONSULT NOTE ADULT - ASSESSMENT
- COVID-19 PNA  - Hypoxic respiratory failure  - Likely ARDS  - H/o EtOH abuse  - Acute renal failure  - Anemia    Recommendations:  Pt admitted to the ICU. Steroids, Remdesivir. Monitor UOP, Is/Os, renal function. Was placed on BPAP. Monitor mental status closely as he was lethargic while on HFNC; is at risk for continued decompensation. ID on board. Actemra being considered. Prognosis is guarded.    D/w hospitalist Dr. Rojo; D/w intensivist Dr. Bonnie Swann M.D.  Pulmonary & Critical Care Medicine  [facility]  [facility] [place]  [address]  [place], [place] [address]  T: [phone]  F: [phone]

## 2021-10-16 NOTE — H&P ADULT - ASSESSMENT
1: Severe ARDS   2: Acute hypoxic respiratory failure   3: COVID-19 pneumonia   4: Severe Agitation   5: HTN  6: AUD    - Full code   - Oxygen requirement: BiPAP for now   - Remdesivir with close monitoring of KAYLEE  - Decadron: increased to 20 mg daily   - Precedex infusion with Morphine IV 2mg     - Inflammatory markers q48-q72 hours   - Nutrition: NPO for now   - Anticoagulation: Lovenox  - Infectious Diseases consult appreciated   - Will try to get in touch with family for GOC     I have personally provided 60 minutes of critical care time excluding time spent on seperate procedures

## 2021-10-16 NOTE — ED PROVIDER NOTE - CRITICAL CARE ATTENDING CONTRIBUTION TO CARE
Upon my evaluation, this patient had a high probability of imminent or life-threatening deterioration due to ACUTE RESPIRATORY DISTRESS , which required my direct attention, intervention, and personal management.    I have personally provided 40 minutes of critical care time exclusive of time spent on separately billable procedures.  Time includes review of laboratory data, radiology results, discussion with consultants, and monitoring for potential decompensation.  Interventions were performed as documented.

## 2021-10-16 NOTE — ED ADULT NURSE REASSESSMENT NOTE - NS ED NURSE REASSESS COMMENT FT1
Pt aox2, breathing labored on high flow NC with sats wdl, nsr on cm. Pt POC explained and verbalized understanding.

## 2021-10-16 NOTE — ED PROVIDER NOTE - PHYSICAL EXAMINATION
General:     NAD, well-nourished, well-appearing  Head:     NC/AT, EOMI, oral mucosa moist  Neck:     trachea midline  Lungs:     bilateral wheezing, tachypneic  CVS:     S1S2, RRR, no m/g/r  Abd:     +BS, s/nt/nd, no organomegaly  Ext:    2+ radial and pedal pulses, + trace pedal edema   Neuro: AAOx3, no sensory/motor deficits

## 2021-10-16 NOTE — CONSULT NOTE ADULT - ASSESSMENT
69 y/o male with pmhx of etoh abuse and HTN now with:    1. acute hypoxic respiratory failure  2. covid-19 pna  3. ARDS      - weaned from bipap to HFNC 100%/ 50 lpm and satting well with no respiratory distress. maintain spo2 > 88%  - start decadron 10 mg dailiy x 10 days  - start remdesevir, mild KAYLEE trend GFR  - inflammatory markers severely elevated with quick decompensation, consult ID for actemra  - tachycardic with d dimer 20,000. CTA pending  - anti-tussives  - encourage prone position    - MV, thiamine, folic acid. monitor for signs of withdrawal    does not require MICU at this time, though he remains high risk for further decompensation requiring transfer to MICU and/or intubation. please reconsult if patients condition changes.

## 2021-10-16 NOTE — ED PROVIDER NOTE - OBJECTIVE STATEMENT
HPI:  69 y/o M with PMH signif for HTN, COPD, etoh abuse now p/w increased SOB. Pt was diagnosed with covid 3-4 days ago now with increased respiratory distress and hypoxia at [place]. Sent in for further eval.     PMH: HTN, COPD, etoh abuse  SOCIAL: +EtOH abuse, denies tobacco/illicit drug use HPI:  71 y/o M with PMH signif for HTN, COPD, etoh abuse now p/w increased SOB. Pt was diagnosed with covid 3-4 days ago now with increased respiratory distress and hypoxia at [place]. Sent in for further eval.   PMH: HTN, COPD, etoh abuse  SOCIAL: +EtOH abuse, denies tobacco/illicit drug use

## 2021-10-16 NOTE — ED PROVIDER NOTE - CLINICAL SUMMARY MEDICAL DECISION MAKING FREE TEXT BOX
covid with hypoxia: x-ray labs, decadron, nebs, felipe covid with hypoxia: x-ray labs, decadron, nebs, felipe, seen by micu who states patient may go to medicine service.

## 2021-10-16 NOTE — ED ADULT NURSE REASSESSMENT NOTE - NS ED NURSE REASSESS COMMENT FT1
pt presents with increased altered mental status and hypoxia. pt brought to critical care at this time. MD Portillo, Alessandro, and Bonnie at bedside. constant obs SNA at bedside. pt placed on bipap per MD Nicole order. awaiting reeval following bipap. pt remains on cardiac monitor and . safety maintained.

## 2021-10-16 NOTE — H&P ADULT - HISTORY OF PRESENT ILLNESS
Obtained from chart:   70M PMH EtOH abuse, HTN who presented from [place] with hypoxia SOB, f/w COVID-19 PNA.   With worsening agitation, and after taking off of his BiPAP this Am, patient got hypoxic to 60's-70's and required morphine, versed push followed Precedex infusion and admitted to MICU as he needed BiPAP 12/6 at 100%.

## 2021-10-16 NOTE — CONSULT NOTE ADULT - ASSESSMENT
70y  Male  with h/o HTN, and ETOH abuse, presented to the ER from [place] with hypoxia and SOB. Patient apparently diagnosed with COVID 19 few days prior to presentation. Patient was admitted to [place] from [facility]  after being in observation in the ER on 9/19/21 and discharged to [facility]. In the ER patient was noted to be hypoxic requiring BIPAP initially and no on HFNC + NRM, no meaningful communication, wakes to stimuli. COVID 19 positive.        Acute Hypoxic respiratory failure  COVID-19 infection  B/L Pneumonia   KAYLEE  Elevated D Dimer  Transaminitis  shortness of breath      - COVID 19 PCR positive   - Blood cultures ordered  - CT Chest reporting no PE   - Continue supportive care measures  - continue to trend inflammatory markers.   - trend CBC with diff, CMP,  CRP, Ferritin, D Dimer  procalcitonin q 48hours  - Avoid antibiotics unless there is a concern for a bacterial infection  - May consider vitamin C, thiamine and zinc (note lack of evidence to support benefit with COVID 19)  - Start Remdesivir 200mg IV x1 followed by 100mg IV daily   - Dexamethasone while on Remdesivir   - Will consider Actemra if fits inclusion criteria and no contraindications, called family for consent, phone numbers are disconnected   - Not a candidate for Casirivimab/Imdevimab due to hypoxia   - Follow up cultures  - Trend Fever  - Trend WBC      Thank you for allowing me to participate in the care of your patient.   Will Follow      d/w Dr Rojo and Dr Swann

## 2021-10-17 NOTE — PROGRESS NOTE ADULT - SUBJECTIVE AND OBJECTIVE BOX
Patient is a 70y old  Male who presents with a chief complaint of covid (16 Oct 2021 05:44)      BRIEF HOSPITAL COURSE:   70M with PMHx EtOH abuse, HTN who presented from NH admitted with COVID-19 PNA. Complicated with ARDS/hypoxemic respiratory failure requiring institution of NIPPV, worsening agitation/confusion requiring placement on precedex for compliance. Admitted to ICU.     Events last 24 hours:   -Remains on NIPPV 12/8 80% satting high 90s.  -Calm on Precedex gtt  -Requiring PRN Hydralazine IVP for elevated BP.  -Afebrile.    PAST MEDICAL & SURGICAL HISTORY:  Hypertension  H/O exploratory laparotomy    Review of Systems:  Due to pt being on NIPPV mask, subjective information was not able to be obtained from the patient. History was obtained, to the extent possible, from review of the chart and collateral sources of information.     Medications:  remdesivir  IVPB 100 milliGRAM(s) IV Intermittent every 24 hours  remdesivir  IVPB   IV Intermittent   cloNIDine Patch 0.1 mG/24Hr(s) 1 patch Transdermal every 7 days  hydrALAZINE Injectable 10 milliGRAM(s) IV Push every 4 hours PRN  budesonide  80 MICROgram(s)/formoterol 4.5 MICROgram(s) Inhaler 2 Puff(s) Inhalation two times a day  dexMEDEtomidine Infusion 0.4 MICROgram(s)/kG/Hr IV Continuous <Continuous>  morphine  - Injectable 2 milliGRAM(s) IV Push every 4 hours PRN  enoxaparin Injectable 40 milliGRAM(s) SubCutaneous daily  dexAMETHasone  IVPB 20 milliGRAM(s) IV Intermittent daily  thiamine Injectable 100 milliGRAM(s) IV Push daily  chlorhexidine 4% Liquid 1 Application(s) Topical <User Schedule>    ICU Vital Signs Last 24 Hrs  T(C): 34.8 (17 Oct 2021 09:01), Max: 36.2 (16 Oct 2021 14:00)  T(F): 94.6 (17 Oct 2021 09:01), Max: 97.2 (16 Oct 2021 14:00)  HR: 59 (17 Oct 2021 10:00) (48 - 105)  BP: 183/103 (17 Oct 2021 10:00) (138/76 - 197/104)  BP(mean): 104 (17 Oct 2021 02:00) (101 - 126)  ABP: --  ABP(mean): --  RR: 30 (17 Oct 2021 10:00) (26 - 52)  SpO2: 100% (17 Oct 2021 10:00) (86% - 100%)      ABG - ( 17 Oct 2021 06:06 )  pH, Arterial: 7.310 pH, Blood: x     /  pCO2: 34    /  pO2: 90    / HCO3: 17    / Base Excess: -9.2  /  SaO2: 98.2                I&O's Detail    16 Oct 2021 07:01  -  17 Oct 2021 07:00  --------------------------------------------------------  IN:    Dexmedetomidine: 33 mL  Total IN: 33 mL    OUT:  Total OUT: 0 mL    Total NET: 33 mL      17 Oct 2021 07:01  -  17 Oct 2021 10:31  --------------------------------------------------------  IN:    Dexmedetomidine: 99 mL    multiple electrolytes Injection Type 1 Bolus: 1000 mL  Total IN: 1099 mL    OUT:  Total OUT: 0 mL    Total NET: 1099 mL            LABS:                        10.5   10.58 )-----------( 136      ( 17 Oct 2021 07:01 )             31.2     10-17    x   |  x   |  x   ----------------------------<  x   x    |  x   |  2.31<H>    Ca    8.5<L>      17 Oct 2021 07:01  Phos  5.4     10-17  Mg     2.0     10-17    TPro  7.2  /  Alb  2.7<L>  /  TBili  1.1  /  DBili  0.5<H>  /  AST  71<H>  /  ALT  19  /  AlkPhos  67  10-17      CARDIAC MARKERS ( 16 Oct 2021 12:37 )  x     / x     / 73 U/L / x     / x      CARDIAC MARKERS ( 16 Oct 2021 01:34 )  x     / 0.04 ng/mL / x     / x     / x          CAPILLARY BLOOD GLUCOSE      POCT Blood Glucose.: 181 mg/dL (17 Oct 2021 00:21)    PT/INR - ( 17 Oct 2021 07:02 )   PT: 17.2 sec;   INR: 1.51 ratio         PTT - ( 16 Oct 2021 01:34 )  PTT:25.9 sec    CULTURES:      Physical Examination:    General: No acute distress.  Alert, oriented, interactive, nonfocal    HEENT: Pupils equal, reactive to light.  Symmetric.    PULM: Clear to auscultation bilaterally, no significant sputum production    CVS: Regular rate and rhythm, no murmurs, rubs, or gallops    ABD: Soft, nondistended, nontender, normoactive bowel sounds, no masses    EXT: No edema, nontender    SKIN: Warm and well perfused, no rashes noted.    NEURO: A&Ox3, strength 5/5 all extremities, cranial nerves grossly intact, no focal deficits      RADIOLOGY: ***      CRITICAL CARE TIME SPENT: ***  Evaluating/treating patient, reviewing data/labs/imaging, discussing case with multidisciplinary team, discussing plan/goals of care with patient/family. Non-inclusive of procedure time.   Patient is a 70y old  Male who presents with a chief complaint of covid (16 Oct 2021 05:44)      BRIEF HOSPITAL COURSE:   70M with PMHx EtOH abuse, HTN who presented from NH admitted with COVID-19 PNA. Complicated with ARDS/hypoxemic respiratory failure requiring institution of NIPPV, worsening agitation/confusion requiring placement on precedex for compliance. Admitted to ICU.     Events last 24 hours:   -Remains on NIPPV 12/8 80% satting high 90s.  -Calm on Precedex gtt  -Requiring PRN Hydralazine IVP for elevated BP.  -Afebrile.    PAST MEDICAL & SURGICAL HISTORY:  Hypertension  H/O exploratory laparotomy    Review of Systems:  Due to pt being on NIPPV mask, subjective information was not able to be obtained from the patient. History was obtained, to the extent possible, from review of the chart and collateral sources of information.     Medications:  remdesivir  IVPB 100 milliGRAM(s) IV Intermittent every 24 hours  remdesivir  IVPB   IV Intermittent   cloNIDine Patch 0.1 mG/24Hr(s) 1 patch Transdermal every 7 days  hydrALAZINE Injectable 10 milliGRAM(s) IV Push every 4 hours PRN  budesonide  80 MICROgram(s)/formoterol 4.5 MICROgram(s) Inhaler 2 Puff(s) Inhalation two times a day  dexMEDEtomidine Infusion 0.4 MICROgram(s)/kG/Hr IV Continuous <Continuous>  morphine  - Injectable 2 milliGRAM(s) IV Push every 4 hours PRN  enoxaparin Injectable 40 milliGRAM(s) SubCutaneous daily  dexAMETHasone  IVPB 20 milliGRAM(s) IV Intermittent daily  thiamine Injectable 100 milliGRAM(s) IV Push daily  chlorhexidine 4% Liquid 1 Application(s) Topical <User Schedule>    ICU Vital Signs Last 24 Hrs  T(C): 34.8 (17 Oct 2021 09:01), Max: 36.2 (16 Oct 2021 14:00)  T(F): 94.6 (17 Oct 2021 09:01), Max: 97.2 (16 Oct 2021 14:00)  HR: 59 (17 Oct 2021 10:00) (48 - 105)  BP: 183/103 (17 Oct 2021 10:00) (138/76 - 197/104)  BP(mean): 104 (17 Oct 2021 02:00) (101 - 126)  ABP: --  ABP(mean): --  RR: 30 (17 Oct 2021 10:00) (26 - 52)  SpO2: 100% (17 Oct 2021 10:00) (86% - 100%)    ABG - ( 17 Oct 2021 06:06 )  pH, Arterial: 7.310 pH, Blood: x     /  pCO2: 34    /  pO2: 90    / HCO3: 17    / Base Excess: -9.2  /  SaO2: 98.2      I&O's Detail  16 Oct 2021 07:01  -  17 Oct 2021 07:00  --------------------------------------------------------  IN:    Dexmedetomidine: 33 mL  Total IN: 33 mL    OUT:  Total OUT: 0 mL  Total NET: 33 mL    17 Oct 2021 07:01  -  17 Oct 2021 10:31  --------------------------------------------------------  IN:    Dexmedetomidine: 99 mL    multiple electrolytes Injection Type 1 Bolus: 1000 mL  Total IN: 1099 mL    OUT:  Total OUT: 0 mL  Total NET: 1099 mL    LABS:             10.5   10.58 )-----------( 136      ( 17 Oct 2021 07:01 )             31.2     10-17    x   |  x   |  x   ----------------------------<  x   x    |  x   |  2.31<H>    Ca    8.5<L>      17 Oct 2021 07:01  Phos  5.4     10-17  Mg     2.0     10-17    TPro  7.2  /  Alb  2.7<L>  /  TBili  1.1  /  DBili  0.5<H>  /  AST  71<H>  /  ALT  19  /  AlkPhos  67  10-17    CARDIAC MARKERS ( 16 Oct 2021 12:37 )  x     / x     / 73 U/L / x     / x      CARDIAC MARKERS ( 16 Oct 2021 01:34 )  x     / 0.04 ng/mL / x     / x     / x        CAPILLARY BLOOD GLUCOSE  POCT Blood Glucose.: 181 mg/dL (17 Oct 2021 00:21)    PT/INR - ( 17 Oct 2021 07:02 )   PT: 17.2 sec;   INR: 1.51 ratio    PTT - ( 16 Oct 2021 01:34 )  PTT:25.9 sec    CULTURES:    Physical Examination:  General: +NIPPV mask.  NECK: Supple.  HEENT: PERRL.  PULM: Clear to auscultation bilaterally.  CVS: s1/s2.  ABD: Soft, nondistended, nontender, normoactive bowel sounds.  EXT: No edema, nontender  SKIN: Warm and well perfused, no rashes noted.  NEURO: A&Ox3, strength 5/5 all extremities, cranial nerves grossly intact, no focal deficits    RADIOLOGY:   < from: CT Angio Chest PE Protocol w/ IV Cont (10.16.21 @ 09:36) >   EXAM:  CT ANGIO CHEST PULM ART WAWIC                        PROCEDURE DATE:  10/16/2021    IMPRESSION:  No pulmonary embolism identified.  Extensive bilateral groundglass opacities with peripheral predominance in the upper lobes, compatible with Covid 19 pneumonia.  Additional findings as above.    70M with PMHx EtOH abuse, HTN who presented from NH admitted with COVID-19 PNA. Complicated with ARDS/hypoxemic respiratory failure requiring institution of NIPPV, worsening agitation/confusion requiring placement on precedex for compliance. Admitted to ICU.   Assessment:  1. Acute Hypoxic Respiratory Failure  2. ARDS  3. COVID PNA  4. HTN  5. KAYLEE    Plan:  NEURO:  -Precedex gtt to facilitate anxiolysis / NIPPV compliance.     CV:  -Requiring PRN Hydralazine for elevated BP. Avoid BB as pt mildly bradycardic on Precedex gtt. Maintain goal MAP >65-70.  -Keep K~4 and Mg>2 for optimal arrhythmia suppression     RESP:  -COVID PNA, currently on BiPAP 12/8 80% satting mid to high 90s--> actively titrated to 70% at bedside, satting mid 90s. Actively titrate to maintain goal SpO2 >88%.  -Pulmonary toilet, encourage incentive spirometer use, prone positioning encouraged as tolerated.   -Given patient's extremely tenuous respiratory status, high risk for further life threatening respiratory deterioration, will proceed with a low threshold for endotracheal intubation.     RENAL:  -Even to negative fluid balance based on hemodynamics and renal function. Strict I/Os. Renal adjust medications and avoid nephrotoxins.     GI:  -NPO while on NIPPV.     ENDO:  -Aggressive glycemic control to limit FS glucose to <180mg/dl. BS WNL. A1C WNL.     ID:  -Remdesivir course, high dose Decadron course. ID following.   -ABX use and/or discontinuation based on discussion with ID in conjunction with clinical features, culture data, and judicious procalcitonin monitoring.     HEME:  -DVT ppx w/ Lovenox.     COVID19 specific considerations and therapeutic options based on the available and rapidly changing literature    CRITICAL CARE TIME SPENT:  43 minutes of critical care time spent providing medical care for patient's acute illness/conditions that impairs at least one vital organ system and/or poses a high risk of imminent or life threatening deterioration in the patient's condition. It includes time spent evaluating and treating the patient's acute illness as well as time spent reviewing labs, radiology, discussing goals of care with patient and/or patient's family, and discussing the case with a multidisciplinary team, including the eICU, in an effort to prevent further life threatening deterioration or end organ damage. This time is independent of any procedures performed.    Patient is a 70y old  Male who presents with a chief complaint of covid (16 Oct 2021 05:44)      BRIEF HOSPITAL COURSE:   70M with PMHx EtOH abuse, HTN who presented from NH admitted with COVID-19 PNA. Complicated with ARDS/hypoxemic respiratory failure requiring institution of NIPPV, worsening agitation/confusion requiring placement on precedex for compliance. Admitted to ICU.     Events last 24 hours:   -Remains on NIPPV 12/8 80% satting high 90s.  -Calm on Precedex gtt  -Requiring PRN Hydralazine IVP for elevated BP.  -Afebrile.    PAST MEDICAL & SURGICAL HISTORY:  Hypertension  H/O exploratory laparotomy    Review of Systems:  Due to pt being on NIPPV mask, subjective information was not able to be obtained from the patient. History was obtained, to the extent possible, from review of the chart and collateral sources of information.     Medications:  remdesivir  IVPB 100 milliGRAM(s) IV Intermittent every 24 hours  remdesivir  IVPB   IV Intermittent   cloNIDine Patch 0.1 mG/24Hr(s) 1 patch Transdermal every 7 days  hydrALAZINE Injectable 10 milliGRAM(s) IV Push every 4 hours PRN  budesonide  80 MICROgram(s)/formoterol 4.5 MICROgram(s) Inhaler 2 Puff(s) Inhalation two times a day  dexMEDEtomidine Infusion 0.4 MICROgram(s)/kG/Hr IV Continuous <Continuous>  morphine  - Injectable 2 milliGRAM(s) IV Push every 4 hours PRN  enoxaparin Injectable 40 milliGRAM(s) SubCutaneous daily  dexAMETHasone  IVPB 20 milliGRAM(s) IV Intermittent daily  thiamine Injectable 100 milliGRAM(s) IV Push daily  chlorhexidine 4% Liquid 1 Application(s) Topical <User Schedule>    ICU Vital Signs Last 24 Hrs  T(C): 34.8 (17 Oct 2021 09:01), Max: 36.2 (16 Oct 2021 14:00)  T(F): 94.6 (17 Oct 2021 09:01), Max: 97.2 (16 Oct 2021 14:00)  HR: 59 (17 Oct 2021 10:00) (48 - 105)  BP: 183/103 (17 Oct 2021 10:00) (138/76 - 197/104)  BP(mean): 104 (17 Oct 2021 02:00) (101 - 126)  ABP: --  ABP(mean): --  RR: 30 (17 Oct 2021 10:00) (26 - 52)  SpO2: 100% (17 Oct 2021 10:00) (86% - 100%)    ABG - ( 17 Oct 2021 06:06 )  pH, Arterial: 7.310 pH, Blood: x     /  pCO2: 34    /  pO2: 90    / HCO3: 17    / Base Excess: -9.2  /  SaO2: 98.2      I&O's Detail  16 Oct 2021 07:01  -  17 Oct 2021 07:00  --------------------------------------------------------  IN:    Dexmedetomidine: 33 mL  Total IN: 33 mL    OUT:  Total OUT: 0 mL  Total NET: 33 mL    17 Oct 2021 07:01  -  17 Oct 2021 10:31  --------------------------------------------------------  IN:    Dexmedetomidine: 99 mL    multiple electrolytes Injection Type 1 Bolus: 1000 mL  Total IN: 1099 mL    OUT:  Total OUT: 0 mL  Total NET: 1099 mL    LABS:             10.5   10.58 )-----------( 136      ( 17 Oct 2021 07:01 )             31.2     10-17    x   |  x   |  x   ----------------------------<  x   x    |  x   |  2.31<H>    Ca    8.5<L>      17 Oct 2021 07:01  Phos  5.4     10-17  Mg     2.0     10-17    TPro  7.2  /  Alb  2.7<L>  /  TBili  1.1  /  DBili  0.5<H>  /  AST  71<H>  /  ALT  19  /  AlkPhos  67  10-17    CARDIAC MARKERS ( 16 Oct 2021 12:37 )  x     / x     / 73 U/L / x     / x      CARDIAC MARKERS ( 16 Oct 2021 01:34 )  x     / 0.04 ng/mL / x     / x     / x        CAPILLARY BLOOD GLUCOSE  POCT Blood Glucose.: 181 mg/dL (17 Oct 2021 00:21)    PT/INR - ( 17 Oct 2021 07:02 )   PT: 17.2 sec;   INR: 1.51 ratio    PTT - ( 16 Oct 2021 01:34 )  PTT:25.9 sec    CULTURES:    Physical Examination:  General: +NIPPV mask.  NECK: Supple.  HEENT: PERRL.  PULM: Clear to auscultation bilaterally.  CVS: s1/s2.  ABD: Soft, nondistended, nontender, normoactive bowel sounds.  EXT: No edema, nontender  SKIN: Warm and well perfused, no rashes noted.  NEURO: A&Ox3, strength 5/5 all extremities, cranial nerves grossly intact, no focal deficits    RADIOLOGY:   < from: CT Angio Chest PE Protocol w/ IV Cont (10.16.21 @ 09:36) >   EXAM:  CT ANGIO CHEST PULM ART WAWIC                        PROCEDURE DATE:  10/16/2021    IMPRESSION:  No pulmonary embolism identified.  Extensive bilateral groundglass opacities with peripheral predominance in the upper lobes, compatible with Covid 19 pneumonia.  Additional findings as above.    70M with PMHx EtOH abuse, HTN who presented from NH admitted with COVID-19 PNA. Complicated with ARDS/hypoxemic respiratory failure requiring institution of NIPPV, worsening agitation/confusion requiring placement on precedex for compliance. Admitted to ICU.   Assessment:  1. Acute Hypoxic Respiratory Failure  2. ARDS  3. COVID PNA  4. HTN  5. KAYLEE    Plan:  NEURO:  -Precedex gtt to facilitate anxiolysis / NIPPV compliance.   -Morphine PRN added for agitation / respiratory distress.     CV:  -Requiring PRN Hydralazine for elevated BP. Avoid BB as pt mildly bradycardic on Precedex gtt. Maintain goal MAP >65-70.  -Keep K~4 and Mg>2 for optimal arrhythmia suppression     RESP:  -COVID PNA, currently on BiPAP 12/8 80% satting mid to high 90s--> actively titrated to 70% at bedside, satting mid 90s. Actively titrate to maintain goal SpO2 >88%.  -Pulmonary toilet, encourage incentive spirometer use, prone positioning encouraged as tolerated.   -Given patient's extremely tenuous respiratory status, high risk for further life threatening respiratory deterioration, will proceed with a low threshold for endotracheal intubation.     RENAL:  -Even to negative fluid balance based on hemodynamics and renal function. Strict I/Os. Renal adjust medications and avoid nephrotoxins.     GI:  -NPO while on NIPPV.     ENDO:  -Aggressive glycemic control to limit FS glucose to <180mg/dl. BS WNL. A1C WNL.     ID:  -Remdesivir course, high dose Decadron course. ID following.   -ABX use and/or discontinuation based on discussion with ID in conjunction with clinical features, culture data, and judicious procalcitonin monitoring.     HEME:  -DVT ppx w/ Lovenox.     COVID19 specific considerations and therapeutic options based on the available and rapidly changing literature    CRITICAL CARE TIME SPENT:  43 minutes of critical care time spent providing medical care for patient's acute illness/conditions that impairs at least one vital organ system and/or poses a high risk of imminent or life threatening deterioration in the patient's condition. It includes time spent evaluating and treating the patient's acute illness as well as time spent reviewing labs, radiology, discussing goals of care with patient and/or patient's family, and discussing the case with a multidisciplinary team, including the eICU, in an effort to prevent further life threatening deterioration or end organ damage. This time is independent of any procedures performed.

## 2021-10-17 NOTE — PROGRESS NOTE ADULT - ASSESSMENT
70y  Male  with h/o HTN, and ETOH abuse, presented to the ER from [place] with hypoxia and SOB. Patient apparently diagnosed with COVID 19 few days prior to presentation. Patient was admitted to [place] [facility] from [facility]  after being in observation in the ER on 9/19/21 and discharged to [facility]. In the ER patient was noted to be hypoxic requiring BIPAP initially and no on HFNC + NRM, no meaningful communication, wakes to stimuli. COVID 19 positive.        Acute Hypoxic respiratory failure  COVID-19 infection  B/L Pneumonia   KAYLEE  Elevated D Dimer  Transaminitis  shortness of breath      - COVID 19 PCR positive   - Blood cultures ordered  - CT Chest reporting no PE   - Continue supportive care measures  - continue to trend inflammatory markers.   - trend CBC with diff, CMP,  CRP, Ferritin, D Dimer  procalcitonin q 48hours  - Avoid antibiotics unless there is a concern for a bacterial infection  - May consider vitamin C, thiamine and zinc (note lack of evidence to support benefit with COVID 19)  - Continue Remdesivir 200mg IV x1 followed by 100mg IV daily   - Dexamethasone while on Remdesivir   - Will consider Actemra if fits inclusion criteria and no contraindications, called family for consent, phone numbers are disconnected   - Not a candidate for Casirivimab/Imdevimab due to hypoxia   - Follow up cultures  - Trend Fever  - Trend WBC      Thank you for allowing me to participate in the care of your patient.   Will Follow      d/w Dr Swann

## 2021-10-17 NOTE — PROGRESS NOTE ADULT - SUBJECTIVE AND OBJECTIVE BOX
[facility]  INFECTIOUS DISEASES AND INTERNAL MEDICINE at [place]  =======================================================  Jorge Angel MD  Diplomates American Board of Internal Medicine and Infectious Diseases  Tel: [phone]      Fax: [phone]  =======================================================    [place] LOOMIS [id number]    Follow up: COVID 19    Remains on BIPAP      Allergies:  No Known Allergies      REVIEW OF SYSTEMS:  Unable to obtain due to medical condition       Physical Exam:  GEN: Moderate respiratory distress   HEENT: normocephalic and atraumatic. EOMI. PERRL.  Anicteric  NECK: Supple.   LUNGS: Coarse BS B/L   HEART: Regular rate and rhythm   ABDOMEN: Soft, nontender, and nondistended.  Positive bowel sounds.    : No CVA tenderness  EXTREMITIES: Without any edema.  MSK: No joint swelling  NEUROLOGIC: wakes to stimuli   PSYCHIATRIC: unable to assess  SKIN: No Rash      Vitals:  T(F): 94.6 (17 Oct 2021 09:01), Max: 97.2 (16 Oct 2021 14:00)  HR: 59 (17 Oct 2021 11:04)  BP: 176/85 (17 Oct 2021 11:04)  RR: 25 (17 Oct 2021 11:04)  SpO2: 98% (17 Oct 2021 11:04) (86% - 100%)  temp max in last 48H T(F): , Max: 99.8 (10-16-21 @ 05:52)      Current Antibiotics:  remdesivir  IVPB 100 milliGRAM(s) IV Intermittent every 24 hours  remdesivir  IVPB   IV Intermittent       Other medications:  budesonide  80 MICROgram(s)/formoterol 4.5 MICROgram(s) Inhaler 2 Puff(s) Inhalation two times a day  chlorhexidine 4% Liquid 1 Application(s) Topical <User Schedule>  cloNIDine Patch 0.1 mG/24Hr(s) 1 patch Transdermal every 7 days  dexAMETHasone  IVPB 20 milliGRAM(s) IV Intermittent daily  dexMEDEtomidine Infusion 0.4 MICROgram(s)/kG/Hr IV Continuous <Continuous>  enoxaparin Injectable 40 milliGRAM(s) SubCutaneous daily  thiamine Injectable 100 milliGRAM(s) IV Push daily                 10.5   10.58 )-----------( 136      ( 17 Oct 2021 07:01 )             31.2     10-17    x   |  x   |  x   ----------------------------<  x   x    |  x   |  2.31<H>    Ca    8.5<L>      17 Oct 2021 07:01  Phos  5.4     10-17  Mg     2.0     10-17    TPro  7.2  /  Alb  2.7<L>  /  TBili  1.1  /  DBili  0.5<H>  /  AST  71<H>  /  ALT  19  /  AlkPhos  67  10-17      WBC Count: 10.58 K/uL (10-17-21 @ 07:01)  WBC Count: 5.51 K/uL (10-16-21 @ 12:37)  WBC Count: 8.94 K/uL (10-16-21 @ 01:34)    Creatinine, Serum: 2.31 mg/dL (10-17-21 @ 07:02)  Creatinine, Serum: 2.35 mg/dL (10-17-21 @ 07:01)  Creatinine, Serum: 1.44 mg/dL (10-16-21 @ 13:10)  Creatinine, Serum: 1.39 mg/dL (10-16-21 @ 12:37)  Creatinine, Serum: 1.36 mg/dL (10-16-21 @ 01:34)    C-Reactive Protein, Serum: 154 mg/L (10-16-21 @ 01:34)    Ferritin, Serum: 3083 ng/mL (10-16-21 @ 01:34)    Procalcitonin, Serum: 2.93 ng/mL (10-17-21 @ 07:04)  Procalcitonin, Serum: 2.08 ng/mL (10-16-21 @ 01:34)    COVID-19 PCR: Detected (10-16-21 @ 02:49)  COVID-19 PCR: NotDetec (09-18-21 @ 01:24)

## 2021-10-18 NOTE — PROCEDURE NOTE - ADDITIONAL PROCEDURE DETAILS
Dx: acute hypoxemic respiratory failure, ARDS, COVID-19 Viral PNA, delirium  Procedural time noninclusive of other E/M time.
Dx: acute hypoxemic respiratory failure, ARDS, COVID-19 Viral PNA, delirium  Procedural time noninclusive of other E/M time.

## 2021-10-18 NOTE — PROGRESS NOTE ADULT - SUBJECTIVE AND OBJECTIVE BOX
Patient is a 70y old  Male who presents with a chief complaint of covid (16 Oct 2021 05:44)      BRIEF HOSPITAL COURSE:   70M with PMHx EtOH abuse, HTN who presented from NH admitted with COVID-19 PNA. Complicated with ARDS/hypoxemic respiratory failure requiring institution of NIPPV, worsening agitation/confusion requiring placement on precedex for compliance. Admitted to ICU.     Events last 24 hours: overnight with worsening mentation/agitation, pulling off mask, acute desating with prolonged periods needed to recover. Ultimately unable to control on max dose precedex and narcotics with sustained desat. Pt was intubated. Currently intubated and sedated, on 100%/+12    PAST MEDICAL & SURGICAL HISTORY:  Hypertension    H/O exploratory laparotomy          Hosp day #2d    Vent day #0d        Vital signs / Reviewed and Physical Exam Performed where pertinent and urgently required    Lab / Radiology  studies / ABG / Meds -  reviewed and interpreted into the assessment and treatment plan.    I&O's Summary    16 Oct 2021 07:01  -  17 Oct 2021 07:00  --------------------------------------------------------  IN: 33 mL / OUT: 0 mL / NET: 33 mL    17 Oct 2021 07:01  -  18 Oct 2021 02:27  --------------------------------------------------------  IN: 1820 mL / OUT: 0 mL / NET: 1820 mL      Impression:  1. acute hypoxemic respiratory failure  2. ARDS  3. COVID-19 Viral PNA  4. essential HTN  5. agitation/AMS  6. KAYLEE    Plan:  Neuro - Sedation with propofol with PRN analgesia for comfort and facilitate compliance    CV -  hemodynamics remain stable          will d/c hydralazine for now and monitor BP closely    Pulm -  full vent support with LTV 6-8cc/kg IDW, current plts<30              actively titrating FiO2 for sats>88%             utilization of PEEP for alveolar recruitment              ABG 1hour post intubation             cont symbicort             remains on high dose decadron             full vent bundle added    GI -  NPO currently, unable to pass OGT with 2 direct visualization attempts, posterior oral space with scant blood aborted further attempts    Renal - Cr continues to rise, avoid MAP<65, renally adjust all meds, avoid nephrotoxins, strict I/Os, trend BMP    Heme -  Pharmacologic DVT PPx  in addition to SCD's    ID - cont high dose steroids, Remdesivir d/cd due to GFR<30, abx addition in consultation with ID and clinical features    Endo -  Aggressive glycemic control to limit FS glucose to < 180mg/dl, BS stable, A1c 4.6    HCP ([facility]) updated surrounding overnight events.    COVID 19 specific considerations and therapeutic  options based on the available and rapidly changing literature    55 mins of critical care time spent in the management of this critically ill COVID-19 patient with continuous assessments and interventions based on the interpretation of multiple databases, noninclusive or procedural time.

## 2021-10-18 NOTE — CONSULT NOTE ADULT - SUBJECTIVE AND OBJECTIVE BOX
PULMONARY CONSULT NOTE      CAMRON LOOMIS  MRN-[id number]    Patient is a 70y old  Male who presents with a chief complaint of covid (16 Oct 2021 05:44)      HISTORY OF PRESENT ILLNESS:  70M PMH EtOH abuse, HTN who presented from [place] with hypoxia SOB, f/w COVID-19 PNA. He is unvaccinated against COVID. He was found with hypoxic respiratory failure and lethargy in the ED. Was placed on BPAP initially deemed not an ICU candidate by overnight ICU team but the following morning continued with ongoing respiratory failure and BPAP requirement, and was accepted to the ICU.     MEDICATIONS  (STANDING):  chlorhexidine 4% Liquid 1 Application(s) Topical <User Schedule>  dexAMETHasone  IVPB 20 milliGRAM(s) IV Intermittent daily  dexMEDEtomidine Infusion 0.4 MICROgram(s)/kG/Hr (8.8 mL/Hr) IV Continuous <Continuous>  enoxaparin Injectable 40 milliGRAM(s) SubCutaneous daily  folic acid 1 milliGRAM(s) Oral daily  multivitamin 1 Tablet(s) Oral daily  remdesivir  IVPB   IV Intermittent   thiamine 100 milliGRAM(s) Oral daily    MEDICATIONS  (PRN):  morphine  - Injectable 2 milliGRAM(s) IV Push every 4 hours PRN nghia/ resp distress    Allergies    No Known Allergies    Intolerances      PAST MEDICAL & SURGICAL HISTORY:  Hypertension    H/O exploratory laparotomy      FAMILY HISTORY:  No pertinent family history in first degree relatives          SOCIAL HISTORY  Smoking History:   Denies smoking    REVIEW OF SYSTEMS:  Unable to obtain given AMS      Vital Signs Last 24 Hrs  T(C): 36.4 (16 Oct 2021 07:59), Max: 37.7 (16 Oct 2021 05:52)  T(F): 97.5 (16 Oct 2021 07:59), Max: 99.8 (16 Oct 2021 05:52)  HR: 105 (16 Oct 2021 14:50) (59 - 124)  BP: 177/90 (16 Oct 2021 14:50) (123/75 - 177/90)  BP(mean): 110 (16 Oct 2021 14:50) (110 - 110)  RR: 29 (16 Oct 2021 14:50) (25 - 36)  SpO2: 90% (16 Oct 2021 14:50) (64% - 100%)      PHYSICAL EXAMINATION:  GENERAL: Lethargic  HEENT: NC/AT  NECK: Supple  LUNGS: B/L faint crackles; tachypnea  CV: +S1, S2  ABDOMEN: Soft, non-tender  EXTREMITIES: No rashes, lesions, edema  NEUROLOGIC: Grossly non-focal; lethargic  PSYCH: Normal affect      LABS:                        9.3    5.51  )-----------( 176      ( 16 Oct 2021 12:37 )             28.0     10-16    x   |  x   |  x   ----------------------------<  x   x    |  x   |  1.44<H>    Ca    8.8      16 Oct 2021 12:37  Phos  4.4     10-16  Mg     2.0     10-16    TPro  7.1  /  Alb  2.7<L>  /  TBili  0.6  /  DBili  0.2  /  AST  47<H>  /  ALT  16  /  AlkPhos  55  10-16    PT/INR - ( 16 Oct 2021 12:52 )   PT: 14.3 sec;   INR: 1.25 ratio         PTT - ( 16 Oct 2021 01:34 )  PTT:25.9 sec    ABG - ( 16 Oct 2021 13:08 )  pH, Arterial: 7.360 pH, Blood: x     /  pCO2: 34    /  pO2: 182   / HCO3: 19    / Base Excess: -6.2  /  SaO2: 100.0             CARDIAC MARKERS ( 16 Oct 2021 12:37 )  x     / x     / 73 U/L / x     / x      CARDIAC MARKERS ( 16 Oct 2021 01:34 )  x     / 0.04 ng/mL / x     / x     / x          D-Dimer Assay, Quantitative: 58030 ng/mL DDU (10-16-21 @ 01:34)    Serum Pro-Brain Natriuretic Peptide: 5262 pg/mL (10-16-21 @ 01:34)      Procalcitonin, Serum: 2.08 ng/mL (10-16-21 @ 01:34)      MICROBIOLOGY:  COVID-19 PCR . (10.16.21 @ 02:49)    COVID-19 PCR: Detected: TYPE:(C=Critical, N=Notification, A=Abnormal) _C  TESTS: _COVID-19 PCR  DATE/TIME CALLED: _10/16/2021 04:01:00 EDT  CALLED TO: _Dr. Domenica Kamara  READ BACK (2 Patient Identifiers)(Y/N): _Y  READ BACK VALUES (Y/N): _Y  CALLED BY: _SB  You can help in the fight against COVID-19. [facility] may contact  you to see if you are interested in voluntarily participating in one of  our clinical trials.  Testing is performed using polymerase chain reaction (PCR) or  transcription mediated amplification(TMA). This COVID-19 (SARS-CoV-2)  nucleic acid amplification test was validated by [facility] and is  in use under the FDA Emergency Use Authorization (EUA) for clinical labs  CLIA-certified to perform high complexity testing. Test results should be  correlated with clinical presentation, patient history, and epidemiology.    COVID-19 PCR . (09.18.21 @ 01:24)    COVID-19 PCR: NotDetec: You can help in the fight against COVID-19. [facility] may contact  you to see if you are interested in voluntarily participating in one of  our clinical trials.  Testing is performed using polymerase chain reaction (PCR) or  transcription mediated amplification (TMA). This COVID-19 (SARS-CoV-2)  nucleic acid amplification test was validated by [facility] and is  in use under the FDA Emergency Use Authorization (EUA) for clinical labs  CLIA-certified to perform high complexity testing. Test results should be  correlated with clinical presentation, patient history, and epidemiology.          RADIOLOGY & ADDITIONAL STUDIES:  < from: CT Angio Chest PE Protocol w/ IV Cont (10.16.21 @ 09:36) >   EXAM:  CT ANGIO CHEST PULM [facility]                          PROCEDURE DATE:  10/16/2021          INTERPRETATION:  CLINICAL INFORMATION: Shortness of breath, PE suspected, high pretest probability    COMPARISON: None.    CONTRAST/COMPLICATIONS:  IV Contrast: Omnipaque 350  70 cc administered   30 cc discarded  Oral Contrast: NONE  Complications: None reported at time of study completion    PROCEDURE:  CT Angiography of the Chest.  Sagittal and coronal reformats were performed as well as 3D (MIP) reconstructions.    FINDINGS:  Study is limited by streak artifact from the patient's arms.  LUNGS AND AIRWAYS: Patent central airways.  Extensive bilateral groundglass opacities, predominantly in the periphery.  PLEURA: No pleural effusion.  MEDIASTINUM AND SIDNEY: Multiple prominent bilateral pulmonary nodules.  VESSELS: Adequate opacification of the pulmonary arteries. No pulmonary embolism identified. Atherosclerotic changes of the aorta.  HEART: Heart size is mildly enlarged. No pericardialeffusion. Coronary artery calcifications.  CHEST WALL AND LOWER NECK: Within normal limits.  VISUALIZED UPPER ABDOMEN: Limited by respiratory motion. Atherosclerotic changes of the aorta.  BONES: Degenerative changes.    IMPRESSION:  No pulmonary embolism identified.    Extensive bilateral groundglass opacities with peripheral predominance in the upper lobes, compatible with Covid 19 pneumonia.    Additional findings as above.    --- End of Report ---            VIRGINIA RODRIGEZ MD; Attending Radiologist  This document has been electronically signed. Oct 16 2021 10:25AM    < end of copied text >      ECHO:  < from: TTE Echo Complete w/Doppler (10.06.19 @ 10:50) >   1. Left ventricular ejection fraction, by visual estimation, is 60 to   65%.   2. Normal global left ventricular systolic function.   3. Mildly increased LV wall thickness.   4. Normal left ventricular internal cavity size.   5. Normal right ventricular size and function.   6. There isno evidence of pericardial effusion.   7. Mild mitral valve regurgitation.   8. The pulmonary veins appear normal.    MD Michael Electronically signed on 10/6/2019 at 11:53:26 AM              *** Final ***                  JUAN F KELLER   This document has been electronically signed. Oct  6 2019 10:50AM    < end of copied text >  
Patient is a 70y old  Male who presents with a chief complaint of     BRIEF HOSPITAL COURSE: 69 y/o male with pmhx of etoh abuse, HTN who presented from [place] with shortness of breath diagnosed with covid 4 days ago hypoxic to 70% on arrival started on bipap due to tachypnea and quickly improved. ICU consult called for new bipap/covid hypoxia requirements.      PAST MEDICAL & SURGICAL HISTORY:  Hypertension    H/O exploratory laparotomy      Allergies    No Known Allergies    Intolerances      FAMILY HISTORY:  No pertinent family history in first degree relatives        Family history otherwise noncontributory.      Review of Systems:  CONSTITUTIONAL: No fever, chills, or fatigue  EYES: No eye pain, visual disturbances, or discharge  ENMT:  No difficulty hearing, tinnitus, vertigo; No sinus or throat pain  NECK: No pain or stiffness  RESPIRATORY: No cough, wheezing, chills or hemoptysis; No shortness of breath  CARDIOVASCULAR: No chest pain, palpitations, dizziness, or leg swelling  GASTROINTESTINAL: No abdominal or epigastric pain. No nausea, vomiting, or hematemesis; No diarrhea or constipation. No melena or hematochezia.  GENITOURINARY: No dysuria, frequency, hematuria, or incontinence  NEUROLOGICAL: No headaches, memory loss, loss of strength, numbness, or tremors  SKIN: No itching, burning, rashes, or lesions   MUSCULOSKELETAL: No joint pain or swelling; No muscle, back, or extremity pain  PSYCHIATRIC: No depression, anxiety, mood swings, or difficulty sleeping    All other review of systems negative except as mentioned in HPI.      Social History: etoh abuse      Medications:  remdesivir  IVPB   IV Intermittent   remdesivir  IVPB 200 milliGRAM(s) IV Intermittent every 24 hours      guaifenesin/dextromethorphan Oral Liquid 200 milliLiter(s) Oral every 4 hours PRN        enoxaparin Injectable 40 milliGRAM(s) SubCutaneous daily        dexAMETHasone  IVPB 10 milliGRAM(s) IV Intermittent daily                  ICU Vital Signs Last 24 Hrs  T(C): 36.9 (16 Oct 2021 01:12), Max: 36.9 (16 Oct 2021 01:12)  T(F): 98.4 (16 Oct 2021 01:12), Max: 98.4 (16 Oct 2021 01:12)  HR: 93 (16 Oct 2021 05:39) (93 - 124)  BP: 133/74 (16 Oct 2021 01:12) (133/74 - 133/74)  BP(mean): --  ABP: --  ABP(mean): --  RR: 36 (16 Oct 2021 01:12) (36 - 36)  SpO2: 94% (16 Oct 2021 05:39) (64% - 96%)    Vital Signs Last 24 Hrs  T(C): 36.9 (16 Oct 2021 01:12), Max: 36.9 (16 Oct 2021 01:12)  T(F): 98.4 (16 Oct 2021 01:12), Max: 98.4 (16 Oct 2021 01:12)  HR: 93 (16 Oct 2021 05:39) (93 - 124)  BP: 133/74 (16 Oct 2021 01:12) (133/74 - 133/74)  BP(mean): --  RR: 36 (16 Oct 2021 01:12) (36 - 36)  SpO2: 94% (16 Oct 2021 05:39) (64% - 96%)    ABG - ( 16 Oct 2021 03:16 )  pH, Arterial: 7.400 pH, Blood: x     /  pCO2: 29    /  pO2: 189   / HCO3: 18    / Base Excess: -6.8  /  SaO2: 100.0               I&O's Detail        LABS:                        10.4   8.94  )-----------( 236      ( 16 Oct 2021 01:34 )             31.5     10-16    141  |  109<H>  |  52.5<H>  ----------------------------<  125<H>  4.6   |  17.0<L>  |  1.36<H>    Ca    9.3      16 Oct 2021 01:34    TPro  8.7  /  Alb  3.3  /  TBili  0.7  /  DBili  x   /  AST  65<H>  /  ALT  21  /  AlkPhos  60  10-16      CARDIAC MARKERS ( 16 Oct 2021 01:34 )  x     / 0.04 ng/mL / x     / x     / x          CAPILLARY BLOOD GLUCOSE        PT/INR - ( 16 Oct 2021 01:34 )   PT: 13.0 sec;   INR: 1.13 ratio         PTT - ( 16 Oct 2021 01:34 )  PTT:25.9 sec    CULTURES:      Physical Examination:    GENERAL: No acute distress.      EYES: Pupils equal, reactive to light.  Symmetric.    EARS, NOSE, THROAT: Normal; supple neck, no lymphadenopathy; trachea midline    PULM: rhonchi bilaterally, no significant sputum production. No use of accessory respiratory muscles.    CVS: Regular rate and rhythm, no murmurs, rubs, or gallops    GI: Soft, nondistended, nontender, normoactive bowel sounds, no masses, no guarding    EXTREMITIES: No edema. DP and radial pulses 2+ bilaterally.    SKIN: Warm and well perfused, no rashes noted.    NEURO: Alert, oriented, interactive, nonfocal    DEVICES:     RADIOLOGY: L > R infiltrates     CRITICAL CARE TIME SPENT: 36 minutes of critical care time spent providing medical care for patient's acute illness/conditions that impairs at least one vital organ system and/or poses a high risk of imminent or life threatening deterioration in the patient's condition. It includes time spent evaluating and treating the patient's acute illness as well as time spent reviewing labs, radiology, discussing goals of care with patient and/or patient's family, and discussing the case with a multidisciplinary team in an effort to prevent further life threatening deterioration or end organ damage. This time is independent of any procedures performed.
[facility]  INFECTIOUS DISEASES AND INTERNAL MEDICINE at [place]  =======================================================  Jorge Angel MD  Diplomates American Board of Internal Medicine and Infectious Diseases  Tel: [phone]      Fax: [phone]  =======================================================      N-[id number]  CAMRON LOOMIS    Patient unable to provide history. History obtained from the chart.     CC: Patient is a 70y old  Male who presents with a chief complaint of covid (16 Oct 2021 05:44)      70y  Male  with h/o HTN, and ETOH abuse, presented to the ER from [place] with hypoxia and SOB. Patient apparently diagnosed with COVID 19 few days prior to presentation. Patient was admitted to [facility] from [facility]  after being in observation in the ER on 9/19/21 and discharged to [facility]. In the ER patient was noted to be hypoxic requiring BIPAP initially and no on HFNC + NRM, no meaningful communication, wakes to stimuli. COVID 19 positive. ID input requested.       Past Medical & Surgical Hx:  Hypertension  H/O exploratory laparotomy      Social Hx:  ETOH abuse       FAMILY HISTORY:  Unable to obtain due to medical condition       Allergies  No Known Allergies       REVIEW OF SYSTEMS:  Unable to obtain due to medical condition       Physical Exam:  GEN: Moderate respiratory distress   HEENT: normocephalic and atraumatic. EOMI. PERRL.  Anicteric  NECK: Supple.   LUNGS: Coarse BS B/L   HEART: Regular rate and rhythm   ABDOMEN: Soft, nontender, and nondistended.  Positive bowel sounds.    : No CVA tenderness  EXTREMITIES: Without any edema.  MSK: No joint swelling  NEUROLOGIC: wakes to stimuli   PSYCHIATRIC: unable to assess  SKIN: No Rash    Height (cm): 185.4 (10-16 @ 01:12)      Vitals:  T(F): 97.5 (16 Oct 2021 07:59), Max: 99.8 (16 Oct 2021 05:52)  HR: 70 (16 Oct 2021 08:39)  BP: 123/75 (16 Oct 2021 07:59)  RR: 26 (16 Oct 2021 07:59)  SpO2: 98% (16 Oct 2021 08:39) (64% - 98%)  temp max in last 48H T(F): , Max: 99.8 (10-16-21 @ 05:52)      Current Antibiotics:  remdesivir  IVPB   IV Intermittent     Other medications:  dexAMETHasone  IVPB 10 milliGRAM(s) IV Intermittent daily  enoxaparin Injectable 40 milliGRAM(s) SubCutaneous daily  folic acid 1 milliGRAM(s) Oral daily  multivitamin 1 Tablet(s) Oral daily  thiamine 100 milliGRAM(s) Oral daily                            10.4   8.94  )-----------( 236      ( 16 Oct 2021 01:34 )             31.5     10-16    141  |  109<H>  |  52.5<H>  ----------------------------<  125<H>  4.6   |  17.0<L>  |  1.36<H>    Ca    9.3      16 Oct 2021 01:34    TPro  8.7  /  Alb  3.3  /  TBili  0.7  /  DBili  x   /  AST  65<H>  /  ALT  21  /  AlkPhos  60  10-16        WBC Count: 8.94 K/uL (10-16-21 @ 01:34)    Creatinine, Serum: 1.36 mg/dL (10-16-21 @ 01:34)    C-Reactive Protein, Serum: 154 mg/L (10-16-21 @ 01:34)    Ferritin, Serum: 3083 ng/mL (10-16-21 @ 01:34)    Procalcitonin, Serum: 2.08 ng/mL (10-16-21 @ 01:34)    COVID-19 PCR: Detected (10-16-21 @ 02:49)  COVID-19 PCR: NotDetec (09-18-21 @ 01:24)      < from: CT Angio Chest PE Protocol w/ IV Cont (10.16.21 @ 09:36) >  EXAM:  CT ANGIO CHEST PULM [facility]                          PROCEDURE DATE:  10/16/2021      INTERPRETATION:  CLINICAL INFORMATION: Shortness of breath, PE suspected, high pretest probability    COMPARISON: None.    CONTRAST/COMPLICATIONS:  IV Contrast: Omnipaque 350  70 cc administered   30 cc discarded  Oral Contrast: NONE  Complications: None reported at time of study completion    PROCEDURE:  CT Angiography of the Chest.  Sagittal and coronal reformats were performed as well as 3D (MIP) reconstructions.    FINDINGS:  Study is limited by streak artifact from the patient's arms.  LUNGS AND AIRWAYS: Patent central airways.  Extensive bilateral groundglass opacities, predominantly in the periphery.  PLEURA: No pleural effusion.  MEDIASTINUM AND SIDNEY: Multiple prominent bilateral pulmonary nodules.  VESSELS: Adequate opacification of the pulmonary arteries. No pulmonary embolism identified. Atherosclerotic changes of the aorta.  HEART: Heart size is mildly enlarged. No pericardialeffusion. Coronary artery calcifications.  CHEST WALL AND LOWER NECK: Within normal limits.  VISUALIZED UPPER ABDOMEN: Limited by respiratory motion. Atherosclerotic changes of the aorta.  BONES: Degenerative changes.    IMPRESSION:  No pulmonary embolism identified.    Extensive bilateral groundglass opacities with peripheral predominance in the upper lobes, compatible with Covid 19 pneumonia.    Additional findings as above.  --- End of Report ---  < end of copied text >      
[facility] DIVISION OF KIDNEY DISEASES AND HYPERTENSION -- INITIAL CONSULT NOTE  --------------------------------------------------------------------------------  HPI:  70M with PMHx EtOH abuse, HTN who presented from [facility] admitted with COVID-19 PNA. Complicated with ARDS/hypoxemic respiratory failure requiring institution of NIPPV, worsening agitation/confusion requiring placement on precedex for compliance. Admitted to ICU.   Events last 24 hours: overnight with worsening mentation/agitation, pulling off mask, acute desating with prolonged periods needed to recover. Ultimately unable to control on max dose precedex and narcotics with sustained desat. Pt was intubated. Currently intubated and sedated, on 100%/+12  Nephrology consulted for CVVHDF; worsening pressor requirements / renal failure;      PAST HISTORY  --------------------------------------------------------------------------------  PAST MEDICAL & SURGICAL HISTORY:  Hypertension    H/O exploratory laparotomy      FAMILY HISTORY:  No pertinent family history in first degree relatives      PAST SOCIAL HISTORY:    ALLERGIES & MEDICATIONS  --------------------------------------------------------------------------------  Allergies    No Known Allergies    Intolerances      Standing Inpatient Medications  budesonide  80 MICROgram(s)/formoterol 4.5 MICROgram(s) Inhaler 2 Puff(s) Inhalation two times a day  chlorhexidine 0.12% Liquid 15 milliLiter(s) Oral Mucosa every 12 hours  chlorhexidine 4% Liquid 1 Application(s) Topical <User Schedule>  CRRT Treatment    <Continuous>  dexAMETHasone  IVPB 20 milliGRAM(s) IV Intermittent daily  enoxaparin Injectable 40 milliGRAM(s) SubCutaneous daily  fentaNYL   Infusion. 0.5 MICROgram(s)/kG/Hr IV Continuous <Continuous>  multiple electrolytes Injection Type 1 1000 milliLiter(s) IV Continuous <Continuous>  norepinephrine Infusion 0.05 MICROgram(s)/kG/Min IV Continuous <Continuous>  pantoprazole  Injectable 40 milliGRAM(s) IV Push daily  phenylephrine    Infusion 0.2 MICROgram(s)/kG/Min IV Continuous <Continuous>  Phoxillum Filtration BK 4 / 2.5 5000 milliLiter(s) CRRT <Continuous>  Phoxillum Filtration BK 4 / 2.5 5000 milliLiter(s) CRRT <Continuous>  Phoxillum Filtration BK 4 / 2.5 5000 milliLiter(s) CRRT <Continuous>  propofol Infusion 20 MICROgram(s)/kG/Min IV Continuous <Continuous>  sodium bicarbonate  Infusion 0.128 mEq/kG/Hr IV Continuous <Continuous>  thiamine Injectable 100 milliGRAM(s) IV Push daily  vasopressin Infusion 0.04 Unit(s)/Min IV Continuous <Continuous>    PRN Inpatient Medications  HYDROmorphone  Injectable 1 milliGRAM(s) IV Push every 3 hours PRN      REVIEW OF SYSTEMS  --------------------------------------------------------------------------------  Unable to obtain    VITALS/PHYSICAL EXAM  --------------------------------------------------------------------------------  T(C): 37.2 (10-18-21 @ 08:44), Max: 37.2 (10-18-21 @ 08:44)  HR: 136 (10-18-21 @ 10:40) (56 - 136)  BP: 74/50 (10-18-21 @ 09:00) (64/47 - 173/80)  RR: 32 (10-18-21 @ 09:00) (20 - 34)  SpO2: 91% (10-18-21 @ 10:40) (87% - 100%)  Wt(kg): --        10-17-21 @ 07:01  -  10-18-21 @ 07:00  --------------------------------------------------------  IN: 2273.3 mL / OUT: 30 mL / NET: 2243.3 mL    10-18-21 @ 07:01  -  10-18-21 @ 12:24  --------------------------------------------------------  IN: 225.5 mL / OUT: 0 mL / NET: 225.5 mL      Physical Exam: Limited 2/2 CoVID Isolation       LABS/STUDIES  --------------------------------------------------------------------------------              10.5   10.58 >-----------<  136      [10-17-21 @ 07:01]              31.2     x   |  x   |  x   ----------------------------<  x       [10-18-21 @ 05:33]  x    |  x   |  3.77        Ca     7.8     [10-17-21 @ 19:19]      Mg     2.1     [10-17-21 @ 19:19]      Phos  6.3     [10-17-21 @ 19:19]    TPro  6.7  /  Alb  2.5  /  TBili  0.7  /  DBili  0.5  /  AST  65  /  ALT  16  /  AlkPhos  83  [10-18-21 @ 05:33]    PT/INR: PT 22.5 , INR 2.00       [10-18-21 @ 05:33]    CK 73      [10-16-21 @ 12:37]    Creatinine Trend:  SCr 3.77 [10-18 @ 05:33]  SCr 3.01 [10-17 @ 19:19]  SCr 2.31 [10-17 @ 07:02]  SCr 2.35 [10-17 @ 07:01]  SCr 1.44 [10-16 @ 13:10]    Urinalysis - [01-14-21 @ 14:28]      Color Yellow / Appearance Clear / SG 1.015 / pH 6.0      Gluc Negative / Ketone Negative  / Bili Negative / Urobili Negative       Blood Small / Protein 100 / Leuk Est Moderate / Nitrite Negative      RBC 0-2 / WBC 6-10 / Hyaline  / Gran  / Sq Epi  / Non Sq Epi Negative / Bacteria Occasional      Ferritin 3083      [10-16-21 @ 01:34]  TSH 0.71      [09-18-21 @ 06:28]    HCV 0.21, Nonreact      [10-04-19 @ 15:53]    
  HPI:  Obtained from chart:   70M PMH EtOH abuse, HTN who presented from [place] with hypoxia SOB, f/w COVID-19 PNA.   With worsening agitation, and after taking off of his BiPAP this Am, patient got hypoxic to 60's-70's and required morphine, versed push followed Precedex infusion and admitted to MICU as he needed BiPAP 12/6 at 100%.    (16 Oct 2021 18:32)      PERTINENT PMH REVIEWED: Yes No    PAST MEDICAL & SURGICAL HISTORY:  Hypertension    H/O exploratory laparotomy        SOCIAL HISTORY:                      Substance history:                    Admitted from:  home  [facility]                     Rastafari/spirituality:                    Cultural concerns:                      Surrogate/HCP/Guardian: Phone#:    FAMILY HISTORY:  No pertinent family history in first degree relatives        Allergies    No Known Allergies    Intolerances        ADVANCE DIRECTIVES/TREATMENT PREFERENCES:  Full code, all aggressive measures desired   DNR/DNI - MOLST, continue all other medical treatments  DNR/DNI - MOLST, comfort measures only     Baseline ADLs (prior to admission):  Independent/ Dependent      Karnofsky/Palliative Performance Status Version 2:  %  http://npcrc.org/files/news/palliative_performance_scale_ppsv2.pdf    Present Symptoms:     Dyspnea: Mild Moderate Severe  Nausea/Vomiting: Yes No  Anxiety:  Yes No  Depression: Yes No  Fatigue: Yes No  Loss of appetite: Yes No  Constipation:     Pain:             Character-            Duration-            Effect-            Factors-            Frequency-            Location-            Severity-    Pain AD Score:  [email or web address]/[email or web address] (press ctrl + left click to view)    Review of Systems: Reviewed                     Negative:                     Positive:  Unable to obtain due to poor mentation   All others negative    MEDICATIONS  (STANDING):  chlorhexidine 0.12% Liquid 15 milliLiter(s) Oral Mucosa every 12 hours  chlorhexidine 4% Liquid 1 Application(s) Topical <User Schedule>  CRRT Treatment    <Continuous>  HYDROmorphone Infusion 5 mG/Hr (5 mL/Hr) IV Continuous <Continuous>  midazolam Injectable 2 milliGRAM(s) IV Push every 30 minutes  multiple electrolytes Injection Type 1 1000 milliLiter(s) (75 mL/Hr) IV Continuous <Continuous>  Phoxillum Filtration BK 4 / 2.5 5000 milliLiter(s) (1000 mL/Hr) CRRT <Continuous>  Phoxillum Filtration BK 4 / 2.5 5000 milliLiter(s) (500 mL/Hr) CRRT <Continuous>  Phoxillum Filtration BK 4 / 2.5 5000 milliLiter(s) (1500 mL/Hr) CRRT <Continuous>    MEDICATIONS  (PRN):      PHYSICAL EXAM:    Vital Signs Last 24 Hrs  T(C): 38.6 (18 Oct 2021 12:36), Max: 38.6 (18 Oct 2021 12:36)  T(F): 101.5 (18 Oct 2021 12:36), Max: 101.5 (18 Oct 2021 12:36)  HR: 0 (18 Oct 2021 13:54) (0 - 136)  BP: 0/0 (18 Oct 2021 13:54) (0/0 - 173/80)  BP(mean): 0 (18 Oct 2021 13:54) (0 - 97)  RR: 0 (18 Oct 2021 13:54) (0 - 32)  SpO2: 99% (18 Oct 2021 12:36) (91% - 100%)    General: M NAD intubated  HEENT: normal  +ET tube  Lungs: comfortable   CV: normal   GI: normal   :  oliguria/anuria  tray  MSK: bedbound/wheelchair bound  Neuro: sedated  Skin: normal      LABS:                        9.3    27.28 )-----------( 148      ( 18 Oct 2021 12:55 )             30.3     10-18    148<H>  |  110<H>  |  128.4<H>  ----------------------------<  82  5.7<H>   |  11.0<L>  |  4.88<H>    Ca    8.1<L>      18 Oct 2021 12:55  Phos  9.5     10-18  Mg     2.4     10-18    TPro  7.2  /  Alb  2.6<L>  /  TBili  0.9  /  DBili  x   /  AST  78<H>  /  ALT  19  /  AlkPhos  112  10-18    PT/INR - ( 18 Oct 2021 05:33 )   PT: 22.5 sec;   INR: 2.00 ratio             I&O's Summary    17 Oct 2021 07:01  -  18 Oct 2021 07:00  --------------------------------------------------------  IN: 2273.3 mL / OUT: 30 mL / NET: 2243.3 mL    18 Oct 2021 07:01  -  18 Oct 2021 14:34  --------------------------------------------------------  IN: 225.5 mL / OUT: 0 mL / NET: 225.5 mL        RADIOLOGY & ADDITIONAL STUDIES:    < from: Xray Chest 1 View- PORTABLE-Urgent (10.16.21 @ 03:57) >     EXAM:  XR CHEST PORTABLE URGENT 1V                          PROCEDURE DATE:  10/16/2021          INTERPRETATION:  AP chest.    Clinical indications: Shortness of breath.    IMPRESSION: The cardiac silhouette is enlarged. There is bilateral patchy opacification within the lungs compatible with pneumonia that is more prominent on the left. The bones are intact.    --- End of Report ---    < end of copied text >

## 2021-10-18 NOTE — CONSULT NOTE ADULT - ASSESSMENT
1) ATN  2) Hypotension  3) CoVID PNA  4) Oliguria    Two physician consent obtained; Dr Nicole from [facility] and myself; unable to reach family  Orders in place for CVVHDF    [facility]

## 2021-10-18 NOTE — CONSULT NOTE ADULT - ASSESSMENT
70yr man hx HTN, Etoh abuse  from [facility] with admitted with COVID PNA in  MOF     1. Acute Hypoxic Respiratory Failure  Intubated this am  Cont vent support - wean per MICU  Requiring 2 pressors - poor prognosis.     2.  COVID PNA  ID following   Remdesivir / Decadron     3.  KAYLEE  avoid nephrotoxins      4. Encounter for Palliative Care  Patient in MOF - poor prognosis.  Plan to call family to discuss further GOC.     70yr man hx HTN, Etoh abuse  from [facility] with admitted with COVID PNA in  MOF     1. Acute Hypoxic Respiratory Failure  Intubated this am  Cont vent support - wean per MICU  Requiring 2 pressors - poor prognosis.     2.  COVID PNA  ID following   Remdesivir / Decadron     3.  KAYLEE  avoid nephrotoxins      4. Encounter for Palliative Care  Patient in MOF - poor prognosis.  Plan to call family to discuss further GOC.  Will request liberalization of visitation given poor outlook     Addendum:    Called by Dr. Nicole who spoke to  family of patient poor condition. They elected no dialysis and wants to focus on his comfort.  Family offered visitation but declined

## 2021-10-18 NOTE — PROGRESS NOTE ADULT - SUBJECTIVE AND OBJECTIVE BOX
Patient is a 70y old  Male who presents with a chief complaint of covid (16 Oct 2021 05:44)      BRIEF HOSPITAL COURSE: ***    Events last 24 hours: ***    PAST MEDICAL & SURGICAL HISTORY:  Hypertension    H/O exploratory laparotomy        Review of Systems:  CONSTITUTIONAL: No fever, chills, or fatigue  EYES: No eye pain, visual disturbances, or discharge  ENMT:  No difficulty hearing, tinnitus, vertigo; No sinus or throat pain  NECK: No pain or stiffness  RESPIRATORY: No cough, wheezing, chills or hemoptysis; No shortness of breath  CARDIOVASCULAR: No chest pain, palpitations, dizziness, or leg swelling  GASTROINTESTINAL: No abdominal or epigastric pain. No nausea, vomiting, or hematemesis; No diarrhea or constipation. No melena or hematochezia.  GENITOURINARY: No dysuria, frequency, hematuria, or incontinence  NEUROLOGICAL: No headaches, memory loss, loss of strength, numbness, or tremors  SKIN: No itching, burning, rashes, or lesions   MUSCULOSKELETAL: No joint pain or swelling; No muscle, back, or extremity pain  PSYCHIATRIC: No depression, anxiety, mood swings, or difficulty sleeping      Medications:      budesonide  80 MICROgram(s)/formoterol 4.5 MICROgram(s) Inhaler 2 Puff(s) Inhalation two times a day    etomidate Injectable 20 milliGRAM(s) IV Push once  HYDROmorphone  Injectable 1 milliGRAM(s) IV Push every 3 hours PRN  propofol Infusion 20 MICROgram(s)/kG/Min IV Continuous <Continuous>  propofol Injectable 100 milliGRAM(s) IV Push once  rocuronium Injectable 50 milliGRAM(s) IV Push once      enoxaparin Injectable 40 milliGRAM(s) SubCutaneous daily    pantoprazole  Injectable 40 milliGRAM(s) IV Push daily      dexAMETHasone  IVPB 20 milliGRAM(s) IV Intermittent daily    thiamine Injectable 100 milliGRAM(s) IV Push daily      chlorhexidine 0.12% Liquid 15 milliLiter(s) Oral Mucosa every 12 hours  chlorhexidine 4% Liquid 1 Application(s) Topical <User Schedule>            ICU Vital Signs Last 24 Hrs  T(C): 35.4 (17 Oct 2021 15:31), Max: 35.6 (17 Oct 2021 12:47)  T(F): 95.7 (17 Oct 2021 15:31), Max: 96 (17 Oct 2021 12:47)  HR: 56 (18 Oct 2021 00:05) (54 - 75)  BP: 157/76 (18 Oct 2021 00:05) (100/75 - 196/96)  BP(mean): 97 (17 Oct 2021 19:00) (82 - 104)  ABP: --  ABP(mean): --  RR: 23 (18 Oct 2021 00:05) (21 - 50)  SpO2: 100% (18 Oct 2021 00:05) (87% - 100%)      ABG - ( 17 Oct 2021 06:06 )  pH, Arterial: 7.310 pH, Blood: x     /  pCO2: 34    /  pO2: 90    / HCO3: 17    / Base Excess: -9.2  /  SaO2: 98.2                      LABS:                        10.5   10.58 )-----------( 136      ( 17 Oct 2021 07:01 )             31.2     10-17    147<H>  |  114<H>  |  98.7<H>  ----------------------------<  175<H>  4.5   |  15.0<L>  |  3.01<H>    Ca    7.8<L>      17 Oct 2021 19:19  Phos  6.3     10-17  Mg     2.1     10-17    TPro  7.2  /  Alb  2.7<L>  /  TBili  1.1  /  DBili  0.5<H>  /  AST  71<H>  /  ALT  19  /  AlkPhos  67  10-17      CARDIAC MARKERS ( 16 Oct 2021 12:37 )  x     / x     / 73 U/L / x     / x          CAPILLARY BLOOD GLUCOSE      POCT Blood Glucose.: 181 mg/dL (17 Oct 2021 00:21)    PT/INR - ( 17 Oct 2021 07:02 )   PT: 17.2 sec;   INR: 1.51 ratio             CULTURES:      Physical Examination:    General: No acute distress.  Alert, oriented, interactive, nonfocal    HEENT: Pupils equal, reactive to light.  Symmetric.    PULM: Clear to auscultation bilaterally, no significant sputum production    CVS: Regular rate and rhythm, no murmurs, rubs, or gallops    ABD: Soft, nondistended, nontender, normoactive bowel sounds, no masses    EXT: No edema, nontender    SKIN: Warm and well perfused, no rashes noted.    RADIOLOGY: ***    CRITICAL CARE TIME SPENT: ***

## 2021-10-18 NOTE — GOALS OF CARE CONVERSATION - ADVANCED CARE PLANNING - CONVERSATION DETAILS
I spoke w/ pt's sister-in-law/HCP Minna () with Dr. Nicole. Explained that patient is in a critically ill state w/ multi-vasopressor shock state and renal failure w/ worsening acidosis 2/2 COVID and would require dialysis. Minna stated that at this point, she would not like to put patient through anymore suffering as she understands how sick he is and wants him to be able to pass away comfortably on his own terms w/ dignity. Minna made patient DNR with comfort measures. MOLST form completed and placed in chart. Patient placed on Dilaudid gtt w/ PRN Versed.

## 2021-10-18 NOTE — PROCEDURE NOTE - NSPROCDETAILS_GEN_ALL_CORE
guidewire recovered/lumen(s) aspirated and flushed/sterile dressing applied/sterile technique, catheter placed/ultrasound guidance with use of sterile gel and probe cove
patient pre-oxygenated, tube inserted, placement confirmed

## 2021-10-18 NOTE — DISCHARGE NOTE FOR THE EXPIRED PATIENT - HOSPITAL COURSE
70M with PMHx EtOH abuse, HTN who presented from NH admitted with COVID-19 PNA. Complicated with ARDS/hypoxemic respiratory failure requiring institution of NIPPV, worsening agitation/confusion requiring placement on precedex for compliance. Admitted to ICU. On 10/18/2021 with worsening mentation/agitation, pulling off mask, acute desating with prolonged periods needed to recover. Ultimately unable to control on max dose precedex and narcotics with sustained desat. Pt was intubated and sedated, on 100%/+12.   Pt w/ worsening hypotension w/ increasing Levo requirements. Initially added Vaso and Bicarb gtt.   Called for hypotension again despite Levo, Vaso, Bicarb. Boogie gtt added w/ STAT labs. Worsening Acidosis on ABG w/ renal failure. Nephro consulted. Plan for HD.  Spoke w/ pt's sister-in-law/HCP Minna (). Explained that patient is in a critically ill state w/ multi-vasopressor shock state and renal failure w/ worsening acidosis 2/2 COVID and would require dialysis. Minna stated that at this point, she would not like to put patient through anymore suffering as she understands how sick he is and wants him to be able to pass away comfortably on his own terms w/ dignity. Minna made patient DNR with comfort measures. MOLST form completed and placed in chart. Patient placed on Dilaudid gtt w/ PRN Versed.  Pt noted to be asystole on telemetry, w/o pulses/breath sounds at 13:48.  Family notified. Dr. Nicole aware.

## 2021-10-21 LAB
CULTURE RESULTS: SIGNIFICANT CHANGE UP
CULTURE RESULTS: SIGNIFICANT CHANGE UP
SPECIMEN SOURCE: SIGNIFICANT CHANGE UP
SPECIMEN SOURCE: SIGNIFICANT CHANGE UP

## 2021-10-26 PROCEDURE — 94640 AIRWAY INHALATION TREATMENT: CPT

## 2021-10-26 PROCEDURE — 84100 ASSAY OF PHOSPHORUS: CPT

## 2021-10-26 PROCEDURE — 85014 HEMATOCRIT: CPT

## 2021-10-26 PROCEDURE — 36600 WITHDRAWAL OF ARTERIAL BLOOD: CPT

## 2021-10-26 PROCEDURE — 93005 ELECTROCARDIOGRAM TRACING: CPT

## 2021-10-26 PROCEDURE — 85018 HEMOGLOBIN: CPT

## 2021-10-26 PROCEDURE — 94660 CPAP INITIATION&MGMT: CPT

## 2021-10-26 PROCEDURE — 36415 COLL VENOUS BLD VENIPUNCTURE: CPT

## 2021-10-26 PROCEDURE — 80048 BASIC METABOLIC PNL TOTAL CA: CPT

## 2021-10-26 PROCEDURE — 85730 THROMBOPLASTIN TIME PARTIAL: CPT

## 2021-10-26 PROCEDURE — 80053 COMPREHEN METABOLIC PANEL: CPT

## 2021-10-26 PROCEDURE — 82550 ASSAY OF CK (CPK): CPT

## 2021-10-26 PROCEDURE — 96375 TX/PRO/DX INJ NEW DRUG ADDON: CPT

## 2021-10-26 PROCEDURE — U0003: CPT

## 2021-10-26 PROCEDURE — 84145 PROCALCITONIN (PCT): CPT

## 2021-10-26 PROCEDURE — 83880 ASSAY OF NATRIURETIC PEPTIDE: CPT

## 2021-10-26 PROCEDURE — 71045 X-RAY EXAM CHEST 1 VIEW: CPT

## 2021-10-26 PROCEDURE — 86140 C-REACTIVE PROTEIN: CPT

## 2021-10-26 PROCEDURE — 86769 SARS-COV-2 COVID-19 ANTIBODY: CPT

## 2021-10-26 PROCEDURE — 80076 HEPATIC FUNCTION PANEL: CPT

## 2021-10-26 PROCEDURE — U0005: CPT

## 2021-10-26 PROCEDURE — 82803 BLOOD GASES ANY COMBINATION: CPT

## 2021-10-26 PROCEDURE — 82330 ASSAY OF CALCIUM: CPT

## 2021-10-26 PROCEDURE — 85027 COMPLETE CBC AUTOMATED: CPT

## 2021-10-26 PROCEDURE — 82962 GLUCOSE BLOOD TEST: CPT

## 2021-10-26 PROCEDURE — 83735 ASSAY OF MAGNESIUM: CPT

## 2021-10-26 PROCEDURE — 84484 ASSAY OF TROPONIN QUANT: CPT

## 2021-10-26 PROCEDURE — 82565 ASSAY OF CREATININE: CPT

## 2021-10-26 PROCEDURE — 85379 FIBRIN DEGRADATION QUANT: CPT

## 2021-10-26 PROCEDURE — 85610 PROTHROMBIN TIME: CPT

## 2021-10-26 PROCEDURE — 84132 ASSAY OF SERUM POTASSIUM: CPT

## 2021-10-26 PROCEDURE — 94002 VENT MGMT INPAT INIT DAY: CPT

## 2021-10-26 PROCEDURE — 82728 ASSAY OF FERRITIN: CPT

## 2021-10-26 PROCEDURE — 82435 ASSAY OF BLOOD CHLORIDE: CPT

## 2021-10-26 PROCEDURE — 83605 ASSAY OF LACTIC ACID: CPT

## 2021-10-26 PROCEDURE — 99285 EMERGENCY DEPT VISIT HI MDM: CPT

## 2021-10-26 PROCEDURE — G1004: CPT

## 2021-10-26 PROCEDURE — 84295 ASSAY OF SERUM SODIUM: CPT

## 2021-10-26 PROCEDURE — 83615 LACTATE (LD) (LDH) ENZYME: CPT

## 2021-10-26 PROCEDURE — 85384 FIBRINOGEN ACTIVITY: CPT

## 2021-10-26 PROCEDURE — 87040 BLOOD CULTURE FOR BACTERIA: CPT

## 2021-10-26 PROCEDURE — 71275 CT ANGIOGRAPHY CHEST: CPT | Mod: ME

## 2021-10-26 PROCEDURE — 85025 COMPLETE CBC W/AUTO DIFF WBC: CPT

## 2021-10-26 PROCEDURE — 87637 SARSCOV2&INF A&B&RSV AMP PRB: CPT

## 2021-10-26 PROCEDURE — 82140 ASSAY OF AMMONIA: CPT

## 2021-10-26 PROCEDURE — 82947 ASSAY GLUCOSE BLOOD QUANT: CPT

## 2021-10-26 PROCEDURE — 96374 THER/PROPH/DIAG INJ IV PUSH: CPT

## 2021-10-26 PROCEDURE — 83036 HEMOGLOBIN GLYCOSYLATED A1C: CPT

## 2021-11-03 NOTE — CHART NOTE - NSCHARTNOTEFT_GEN_A_CORE
Palliative care social work note.    Bereavement call made to patients sister in law Minna. Support and resources offered.
Spoke w/ pt's HCP/Sister-in-law (Minna). Updated on patient's condition. All questions answered.
Nursing notified Respiratory that patient  at this time.   Nursing and Physician at bedside, mechanical ventilation turned off and removed from bedside.
Pt w/ worsening hypotension w/ increasing Levo requirements. Initially added Vaso and Bicarb gtt.   Called for hypotension again despite Levo, Vaso, Bicarb. Boogie gtt added w/ STAT labs. Worsening Acidosis on ABG w/ renal failure. Nephro consulted. Plan for HD, will call family.

## 2024-05-23 NOTE — PATIENT PROFILE ADULT - NSPRESCRALCSCORE_GEN_A_NUR_CAL
FOLLOW UP WITH YOUR DOCTOR THIS WEEK FOR REEVALUATION.      RETURN TO ED IMMEDIATELY WITH ANY WORSENING SYMPTOMS, CHEST PAIN, SHORTNESS OF BREATH, ABDOMINAL PAIN, FEVERS, WEAKNESS, DIZZINESS, PERSISTENT OR SEVERE HEADACHE OR ANY OTHER CONCERNS.     Strain    A strain is a stretch or tear in one of the muscles in your body. This is caused by an injury to the area such as a twisting mechanism. Symptoms include pain, swelling, or bruising. Rest that area over the next several days and slowly resume activity when tolerated. Ice can help with swelling and pain.     SEEK IMMEDIATE MEDICAL CARE IF YOU HAVE ANY OF THE FOLLOWING SYMPTOMS: worsening pain, inability to move that body part, numbness or tingling.     Abdominal Pain    Many things can cause abdominal pain. Many times, abdominal pain is not caused by a disease and will improve without treatment. Your health care provider will do a physical exam to determine if there is a dangerous cause of your pain; blood tests and imaging may help determine the cause of your pain. However, in many cases, no cause may be found and you may need further testing as an outpatient. Monitor your abdominal pain for any changes.     SEEK IMMEDIATE MEDICAL CARE IF YOU HAVE ANY OF THE FOLLOWING SYMPTOMS: worsening abdominal pain, uncontrollable vomiting, profuse diarrhea, inability to have bowel movements or pass gas, black or bloody stools, fever accompanying chest pain or back pain, or fainting. These symptoms may represent a serious problem that is an emergency. Do not wait to see if the symptoms will go away. Get medical help right away. Call 911 and do not drive yourself to the hospital.     Hernia    A hernia is when fat or the intestines push through a weak area in the abdominal wall. This can occur around the belly button (umbilical hernia) or where the leg meets the lower abdomen (inguinal hernia). This creates a rounded lump (bulge). Hernias that can be pushed back into the belly are called reducible and those that cannot are called incarcerated. Hernias that are not reducible and lose their blood supply are called strangulated and require emergency surgery. Hernias can be caused or worsened when straining during bowel movements or lifting heavy objects as well as coughing or sneezing. Surgery may be helpful in treating this condition so follow up with a surgeon.    SEEK IMMEDIATE MEDICAL CARE IF YOU HAVE ANY OF THE FOLLOWING SYMPTOMS: worsening abdominal pain, change in color over the hernia, nausea/vomiting, or inability to have a bowel movement or pass gas.
10

## 2024-07-12 NOTE — ED ADULT TRIAGE NOTE - NS ED NURSE AMBULANCES
[facility] Assistance with ambulation/Assistance OOB with selected safe patient handling equipment/Communicate Risk of Fall with Harm to all staff/Discuss with provider need for PT consult/Monitor gait and stability/Provide patient with walking aids - walker, cane, crutches/Reinforce activity limits and safety measures with patient and family/Tailored Fall Risk Interventions/Visual Cue: Yellow wristband and red socks/Bed in lowest position, wheels locked, appropriate side rails in place/Call bell, personal items and telephone in reach/Instruct patient to call for assistance before getting out of bed or chair/Non-slip footwear when patient is out of bed/[place] to call system/Physically safe environment - no spills, clutter or unnecessary equipment/Purposeful Proactive Rounding/Room/bathroom lighting operational, light cord in reach

## 2024-08-08 NOTE — PROCEDURE NOTE - AIR OBTAINED
Yes Detail Level: Detailed Quality 226: Preventive Care And Screening: Tobacco Use: Screening And Cessation Intervention: Patient screened for tobacco use and is an ex/non-smoker

## 2025-03-14 NOTE — DIETITIAN INITIAL EVALUATION ADULT. - LAB (SPECIFY)
Baseline creatinine is normal at 0.8 to 0.9.   Admission SCr 2.43 on 2/28/25.   SCr has been around 1.8 to 2.0 since admission.   Etiology is not entirely clear - not prerenal or postrenal. Working dx is ATN but could have another underlying pathology.   CT 2/28/25 - no hydronephrosis. UA on 3/8/25 with RBC 2-4, WBC 2-4, small blood and trace protein.   Of note, UPC ratio was 3.4 gm on 3/8/25 and urine ACR was only 821 mg on 3/5/25.   Serum and urine LIDYA no M spike, C3 83 (low), C4 26. At risk for AIN due to antibiotic exposure but no clear indication of this as well.   Renal function stable but SCr above baseline - stable today at 2.07  May consider renal biopsy in the future if renal function deteriorates or if felt to . Not necessary now since stable renal function.   Remains hypervolemic - continue Torsemide 20 mg OD.      monitor H/H, Na, LFT's